# Patient Record
Sex: FEMALE | Race: WHITE | NOT HISPANIC OR LATINO | Employment: OTHER | URBAN - METROPOLITAN AREA
[De-identification: names, ages, dates, MRNs, and addresses within clinical notes are randomized per-mention and may not be internally consistent; named-entity substitution may affect disease eponyms.]

---

## 2017-10-31 ENCOUNTER — GENERIC CONVERSION - ENCOUNTER (OUTPATIENT)
Dept: OTHER | Facility: OTHER | Age: 67
End: 2017-10-31

## 2017-11-04 DIAGNOSIS — Z12.31 ENCOUNTER FOR SCREENING MAMMOGRAM FOR MALIGNANT NEOPLASM OF BREAST: ICD-10-CM

## 2017-11-17 ENCOUNTER — HOSPITAL ENCOUNTER (OUTPATIENT)
Dept: RADIOLOGY | Age: 67
Discharge: HOME | End: 2017-11-17
Payer: MEDICARE

## 2017-11-17 DIAGNOSIS — Z12.31 ENCOUNTER FOR SCREENING MAMMOGRAM FOR MALIGNANT NEOPLASM OF BREAST: ICD-10-CM

## 2017-11-17 PROCEDURE — G0202 SCR MAMMO BI INCL CAD: HCPCS

## 2018-01-22 VITALS
DIASTOLIC BLOOD PRESSURE: 64 MMHG | WEIGHT: 203.31 LBS | SYSTOLIC BLOOD PRESSURE: 126 MMHG | BODY MASS INDEX: 34.71 KG/M2 | HEIGHT: 64 IN

## 2018-10-30 ENCOUNTER — TRANSCRIBE ORDERS (OUTPATIENT)
Dept: ADMINISTRATIVE | Facility: HOSPITAL | Age: 68
End: 2018-10-30

## 2018-10-30 DIAGNOSIS — Z12.39 SCREENING BREAST EXAMINATION: ICD-10-CM

## 2018-10-30 DIAGNOSIS — Z13.820 SCREENING FOR OSTEOPOROSIS: Primary | ICD-10-CM

## 2018-11-26 ENCOUNTER — ANNUAL EXAM (OUTPATIENT)
Dept: OBGYN CLINIC | Facility: CLINIC | Age: 68
End: 2018-11-26
Payer: MEDICARE

## 2018-11-26 VITALS
DIASTOLIC BLOOD PRESSURE: 90 MMHG | WEIGHT: 198.6 LBS | BODY MASS INDEX: 33.9 KG/M2 | SYSTOLIC BLOOD PRESSURE: 140 MMHG | HEIGHT: 64 IN

## 2018-11-26 DIAGNOSIS — Z12.39 ENCOUNTER FOR SCREENING FOR MALIGNANT NEOPLASM OF BREAST: ICD-10-CM

## 2018-11-26 DIAGNOSIS — Z01.419 ENCOUNTER FOR GYNECOLOGICAL EXAMINATION (GENERAL) (ROUTINE) WITHOUT ABNORMAL FINDINGS: Primary | ICD-10-CM

## 2018-11-26 PROCEDURE — G0101 CA SCREEN;PELVIC/BREAST EXAM: HCPCS | Performed by: NURSE PRACTITIONER

## 2018-11-26 RX ORDER — MULTIVITAMIN
TABLET ORAL
COMMUNITY

## 2018-11-26 NOTE — ASSESSMENT & PLAN NOTE
Benign findings on routine gyn exam  Recommended monthly SBE, annual CBE and annual screening mammo  ASCCP guidelines reviewed and  this low risk patient was advised she meets criteria to d/c pap screening given age >71 and low risk status  DEXA is scheduled and colonoscopy noted to be up to date  The patient denies STI risk factors and declines testing at this time  Reviewed diet/activity recommendations  Discussed postmenopausal considerations and symptoms to report  RTO in one year for routine annual gyn exam or sooner PRN

## 2018-12-07 ENCOUNTER — HOSPITAL ENCOUNTER (OUTPATIENT)
Dept: RADIOLOGY | Age: 68
End: 2018-12-07
Payer: MEDICARE

## 2018-12-07 ENCOUNTER — HOSPITAL ENCOUNTER (OUTPATIENT)
Dept: RADIOLOGY | Age: 68
Discharge: HOME | End: 2018-12-07
Payer: MEDICARE

## 2018-12-07 VITALS — HEIGHT: 65 IN | BODY MASS INDEX: 32.32 KG/M2 | WEIGHT: 194 LBS

## 2018-12-07 DIAGNOSIS — Z12.39 ENCOUNTER FOR SCREENING FOR MALIGNANT NEOPLASM OF BREAST: ICD-10-CM

## 2018-12-07 PROCEDURE — 77067 SCR MAMMO BI INCL CAD: CPT

## 2019-10-29 ENCOUNTER — TELEPHONE (OUTPATIENT)
Dept: GASTROENTEROLOGY | Facility: CLINIC | Age: 69
End: 2019-10-29

## 2019-10-29 NOTE — TELEPHONE ENCOUNTER
Patients GI provider:  new pt     Number to return call: ([phone]    Reason for call: Pt calling to make an appt for a colon consult     Scheduled procedure/appointment date if applicable: Apt/procedure - n/a

## 2019-11-08 NOTE — TELEPHONE ENCOUNTER
Pt called in, she is scheduled with Dr Fernanda Suarez on 12/16  She would like to know if her appt could be changed to the same day as her , Jimmy Lorenz who is scheduled on 12/9 with Dr Elana Salinas (or vice versa) because he is her ride for appts   Please advise [phone]

## 2019-11-11 NOTE — TELEPHONE ENCOUNTER
Called patient, lvm to inform of the scheduled appts  Told patient to call back in to confirm this works for both of them

## 2019-11-11 NOTE — TELEPHONE ENCOUNTER
It looks as though her husbands and her appointments were both cancelled by them  Please call and offer 12/10 at 11am for pt and 1130 for  if they would like the apts

## 2019-11-26 ENCOUNTER — TRANSCRIBE ORDERS (OUTPATIENT)
Dept: ADMINISTRATIVE | Facility: HOSPITAL | Age: 69
End: 2019-11-26

## 2019-11-26 DIAGNOSIS — Z12.31 ENCOUNTER FOR SCREENING MAMMOGRAM FOR MALIGNANT NEOPLASM OF BREAST: Primary | ICD-10-CM

## 2020-01-10 ENCOUNTER — ANNUAL EXAM (OUTPATIENT)
Dept: OBGYN CLINIC | Facility: CLINIC | Age: 70
End: 2020-01-10
Payer: COMMERCIAL

## 2020-01-10 VITALS — WEIGHT: 188 LBS | DIASTOLIC BLOOD PRESSURE: 82 MMHG | BODY MASS INDEX: 31.28 KG/M2 | SYSTOLIC BLOOD PRESSURE: 118 MMHG

## 2020-01-10 DIAGNOSIS — Z01.419 ENCOUNTER FOR GYNECOLOGICAL EXAMINATION (GENERAL) (ROUTINE) WITHOUT ABNORMAL FINDINGS: Primary | ICD-10-CM

## 2020-01-10 PROCEDURE — G0101 CA SCREEN;PELVIC/BREAST EXAM: HCPCS | Performed by: NURSE PRACTITIONER

## 2020-01-10 NOTE — ASSESSMENT & PLAN NOTE
Benign findings on routine gyn exam  Recommended monthly SBE, annual CBE and annual screening mammo  ASCCP guidelines reviewed and this low risk patient was advised she meets criteria to d/c pap screening at age 71  DEXA and colonoscopy are scheduled for this year  The patient denies STI risk factors and declines testing at this time  Reviewed diet/activity recommendations:  Encouraged daily Ca++ and vitamin D intake as well as daily weight bearing exercise for promotion of bone health    Discussed postmenopausal considerations and symptoms to report  RTO in one year for routine annual gyn exam or sooner PRN

## 2020-01-10 NOTE — PROGRESS NOTES
Assessment/Plan:    Encounter for gynecological examination (general) (routine) without abnormal findings  Benign findings on routine gyn exam  Recommended monthly SBE, annual CBE and annual screening mammo  ASCCP guidelines reviewed and this low risk patient was advised she meets criteria to d/c pap screening at age 71  DEXA and colonoscopy are scheduled for this year  The patient denies STI risk factors and declines testing at this time  Reviewed diet/activity recommendations:  Encouraged daily Ca++ and vitamin D intake as well as daily weight bearing exercise for promotion of bone health    Discussed postmenopausal considerations and symptoms to report  RTO in one year for routine annual gyn exam or sooner PRN  Diagnoses and all orders for this visit:    Encounter for gynecological examination (general) (routine) without abnormal findings          Subjective:      Patient ID: Tereza Pike is a 78 y o  female  This patient presents for routine annual gyn exam   Medically stable  She denies acute gyn complaints  She denies  bleeding or spotting, VM sx, pelvic pain, breast concerns, abn discharge, bowel/bladder dysfunction, depression/anx   and monog  Denies STI concerns  7 children; 10 grandchildren  Retired      The following portions of the patient's history were reviewed and updated as appropriate: allergies, current medications, past family history, past medical history, past social history, past surgical history and problem list     Review of Systems   Constitutional: Negative  Respiratory: Negative  Cardiovascular: Negative  Gastrointestinal: Negative  Genitourinary: Negative  Musculoskeletal: Negative  Skin: Negative  Neurological: Negative  Psychiatric/Behavioral: Negative  Objective:      /82   Wt 85 3 kg (188 lb)   BMI 31 28 kg/m²          Physical Exam   Constitutional: She is oriented to person, place, and time   She appears well-developed and well-nourished  HENT:   Head: Normocephalic and atraumatic  Eyes: Pupils are equal, round, and reactive to light  EOM are normal    Neck: Normal range of motion  Neck supple  No thyromegaly present  Cardiovascular: Normal rate, regular rhythm and normal heart sounds  Pulmonary/Chest: Effort normal and breath sounds normal  No respiratory distress  She has no wheezes  She has no rales  She exhibits no mass, no tenderness and no deformity  Right breast exhibits no inverted nipple, no mass, no nipple discharge, no skin change and no tenderness  Left breast exhibits no inverted nipple, no mass, no nipple discharge, no skin change and no tenderness  No breast tenderness or discharge  Breasts are symmetrical    Abdominal: Soft  She exhibits no distension and no mass  There is no splenomegaly or hepatomegaly  There is no tenderness  There is no rebound and no guarding  Genitourinary: Rectum normal, vagina normal and uterus normal        No breast tenderness or discharge  No labial fusion  There is no rash, tenderness, lesion or injury on the right labia  There is no rash, tenderness, lesion or injury on the left labia  Cervix exhibits no motion tenderness, no discharge and no friability  Right adnexum displays no mass, no tenderness and no fullness  Left adnexum displays no mass, no tenderness and no fullness  No erythema, tenderness or bleeding in the vagina  No foreign body in the vagina  No vaginal discharge found  Musculoskeletal: Normal range of motion  Lymphadenopathy:     She has no cervical adenopathy  She has no axillary adenopathy  Neurological: She is alert and oriented to person, place, and time  No cranial nerve deficit  Skin: Skin is warm and dry  No rash noted  No cyanosis  Nails show no clubbing  Psychiatric: She has a normal mood and affect   Her speech is normal and behavior is normal  Judgment and thought content normal  Cognition and memory are normal

## 2020-01-28 ENCOUNTER — OFFICE VISIT (OUTPATIENT)
Dept: GASTROENTEROLOGY | Facility: AMBULARY SURGERY CENTER | Age: 70
End: 2020-01-28
Payer: COMMERCIAL

## 2020-01-28 VITALS
TEMPERATURE: 98.9 F | WEIGHT: 210 LBS | SYSTOLIC BLOOD PRESSURE: 120 MMHG | BODY MASS INDEX: 35.85 KG/M2 | HEIGHT: 64 IN | HEART RATE: 78 BPM | RESPIRATION RATE: 16 BRPM | DIASTOLIC BLOOD PRESSURE: 80 MMHG

## 2020-01-28 DIAGNOSIS — Z12.11 COLON CANCER SCREENING: ICD-10-CM

## 2020-01-28 DIAGNOSIS — K59.00 CONSTIPATION, UNSPECIFIED CONSTIPATION TYPE: Primary | ICD-10-CM

## 2020-01-28 PROCEDURE — 99203 OFFICE O/P NEW LOW 30 MIN: CPT | Performed by: INTERNAL MEDICINE

## 2020-01-28 RX ORDER — POLYETHYLENE GLYCOL 3350 17 G/17G
17 POWDER, FOR SOLUTION ORAL DAILY
Qty: 578 G | Refills: 5 | Status: SHIPPED | OUTPATIENT
Start: 2020-01-28 | End: 2021-12-02 | Stop reason: ALTCHOICE

## 2020-01-28 RX ORDER — MELATONIN
1000 DAILY
COMMUNITY

## 2020-01-28 NOTE — PATIENT INSTRUCTIONS
Colon cancer surveillance  - schedule colonoscopy with golytely     Constipation  - start taking fiber (metamucil) daily scheduled  - start taking miralax daily scheduled  - take sennakot 2 tabs daily as needed    Follow up as needed

## 2020-01-28 NOTE — PROGRESS NOTES
[facility] - Outpatient Consultation  Katherine Nettles 78 y o  female MRN: [id number]  Encounter: [id number]          ASSESSMENT AND PLAN:      1  Colon cancer screening  - schedule colonoscopy with golytely     2  Constipation  - start taking fiber daily scheduled  - start taking miralax daily scheduled  - take sennakot 2 tabs daily as needed    Follow up as needed    ______________________________________________________________________    HPI:  Ms Bud Khanna is a 77 yo W who presents to discuss colonoscopy for colon cancer screening  Co-morbidities:    Colon cancer screening  - last colonoscopy 5 years ago (no polyps, but had inadequate prep)  - has constipation chronically  - no chest pain or shortness of breath with exertion    Chronic constipation  - 1-2x per day  - stool is hard; BSS 2-3  - occ strain  - take miralax qd prn and senna 2 tabs prn  - takes metamucil occ too    ROS: No abd pain, nausea, vomiting, heartburn, acid reflux, diarrhea, constipation, odynophagia, dysphagia, hematemesis, melena, hematochezia, early satiety, appetite changes, wt loss  No jaundice, abd swelling, LE swelling, confusion, pruritus  REVIEW OF SYSTEMS:    CONSTITUTIONAL: Denies any fever, chills, rigors, and weight loss  HEENT: No earache or tinnitus  Denies hearing loss or visual disturbances  CARDIOVASCULAR: No chest pain or palpitations  RESPIRATORY: Denies any cough, hemoptysis, shortness of breath or dyspnea on exertion  GASTROINTESTINAL: As noted in the History of Present Illness  GENITOURINARY: No problems with urination  Denies any hematuria or dysuria  NEUROLOGIC: No dizziness or vertigo, denies headaches  MUSCULOSKELETAL: Denies any muscle or joint pain  SKIN: Denies skin rashes or itching  ENDOCRINE: Denies excessive thirst  Denies intolerance to heat or cold  PSYCHOSOCIAL: Denies depression or anxiety  Denies any recent memory loss         Historical Information   No past medical history on file  Past Surgical History:   Procedure Laterality Date    CATARACT EXTRACTION, BILATERAL  2018     SECTION       Social History   Social History     Substance and Sexual Activity   Alcohol Use Yes    Comment: social     Social History     Substance and Sexual Activity   Drug Use No     Social History     Tobacco Use   Smoking Status Never Smoker   Smokeless Tobacco Never Used     Family History   Problem Relation Age of Onset    Diabetes Mother     No Known Problems Father     Diabetes Sister     Diabetes Brother     No Known Problems Daughter     No Known Problems Son     No Known Problems Maternal Grandmother     No Known Problems Maternal Grandfather     No Known Problems Paternal Grandmother     No Known Problems Paternal Grandfather     Diabetes Sister     No Known Problems Sister     No Known Problems Sister     Diabetes Brother     No Known Problems Brother     No Known Problems Brother     No Known Problems Daughter     No Known Problems Daughter     No Known Problems Daughter     No Known Problems Daughter        Meds/Allergies       Current Outpatient Medications:     Multiple Vitamin (MULTI-VITAMIN DAILY) TABS    No Known Allergies        Objective     There were no vitals taken for this visit  There is no height or weight on file to calculate BMI  PHYSICAL EXAM:      General Appearance:   Alert, cooperative, no distress   HEENT:   Normocephalic, atraumatic, anicteric  Neck:  Supple, symmetrical, trachea midline   Lungs:   Clear to auscultation bilaterally; no rales, rhonchi or wheezing; respirations unlabored    Heart[de-identified]   Regular rate and rhythm; no murmur, rub, or gallop     Abdomen:   Soft, non-tender, non-distended; normal bowel sounds; no masses, no organomegaly    Rectal:   Deferred   Neuro:   Alert, oriented, no gross deficits, normal strength and tone   Extremities:  No cyanosis, clubbing or edema    Psych:  Normal mood and affect Skin:  No jaundice, rashes, or lesions    Lymph nodes:  No palpable cervical lymphadenopathy        Lab Results:   No visits with results within 1 Day(s) from this visit  Latest known visit with results is:   [employer] Office Visit on 10/17/2016   Component Date Value    PAP 10/17/2016 NILM     HPV HIGH RISK 16/18 10/17/2016 Not Detected     HPV 18 10/17/2016 Not Detected     HPV16 10/17/2016 Not Detected          Radiology Results:   No results found      Endoscopies:

## 2020-01-31 ENCOUNTER — HOSPITAL ENCOUNTER (OUTPATIENT)
Dept: RADIOLOGY | Age: 70
Discharge: HOME | End: 2020-01-31
Payer: COMMERCIAL

## 2020-01-31 ENCOUNTER — TRANSCRIBE ORDERS (OUTPATIENT)
Dept: ADMINISTRATIVE | Age: 70
End: 2020-01-31

## 2020-01-31 ENCOUNTER — TELEPHONE (OUTPATIENT)
Dept: OBGYN CLINIC | Facility: CLINIC | Age: 70
End: 2020-01-31

## 2020-01-31 VITALS — BODY MASS INDEX: 35.85 KG/M2 | HEIGHT: 64 IN | WEIGHT: 210 LBS

## 2020-01-31 DIAGNOSIS — Z13.820 SCREENING FOR OSTEOPOROSIS: ICD-10-CM

## 2020-01-31 DIAGNOSIS — Z12.31 ENCOUNTER FOR SCREENING MAMMOGRAM FOR MALIGNANT NEOPLASM OF BREAST: ICD-10-CM

## 2020-01-31 DIAGNOSIS — Z13.820 SCREENING FOR OSTEOPOROSIS: Primary | ICD-10-CM

## 2020-01-31 PROCEDURE — 77067 SCR MAMMO BI INCL CAD: CPT

## 2020-01-31 PROCEDURE — 77080 DXA BONE DENSITY AXIAL: CPT

## 2020-01-31 NOTE — TELEPHONE ENCOUNTER
Received call from [facility], they needed new order for dexa, order in was not , they said if more than one year new order needed

## 2020-02-07 ENCOUNTER — TELEPHONE (OUTPATIENT)
Dept: OBGYN CLINIC | Facility: CLINIC | Age: 70
End: 2020-02-07

## 2020-02-07 NOTE — TELEPHONE ENCOUNTER
----- Message from [facility], [address] sent at 2/6/2020  5:09 PM EST -----  DEXA scan shows osteopenia in the left hip  Other sites evaluated are WNL  Please encourage Ca++ 1000mg/day with vit D and daily weight bearing exercise   Repeat DEXA in 2-3 years   Please notify patient

## 2020-05-14 ENCOUNTER — TELEPHONE (OUTPATIENT)
Dept: GASTROENTEROLOGY | Facility: AMBULARY SURGERY CENTER | Age: 70
End: 2020-05-14

## 2020-12-05 ENCOUNTER — TRANSCRIBE ORDERS (OUTPATIENT)
Dept: LAB | Facility: CLINIC | Age: 70
End: 2020-12-05

## 2020-12-05 ENCOUNTER — LAB (OUTPATIENT)
Dept: LAB | Facility: CLINIC | Age: 70
End: 2020-12-05
Payer: COMMERCIAL

## 2020-12-05 DIAGNOSIS — M86.9 SAPHO SYNDROME (HCC): ICD-10-CM

## 2020-12-05 DIAGNOSIS — M85.80 SAPHO SYNDROME (HCC): ICD-10-CM

## 2020-12-05 DIAGNOSIS — E78.1 PURE HYPERGLYCERIDEMIA: ICD-10-CM

## 2020-12-05 DIAGNOSIS — E78.6 FAMILIAL LIPOPROTEIN DEFICIENCY: ICD-10-CM

## 2020-12-05 DIAGNOSIS — L70.9 SAPHO SYNDROME (HCC): ICD-10-CM

## 2020-12-05 DIAGNOSIS — R73.9 BLOOD GLUCOSE ELEVATED: Primary | ICD-10-CM

## 2020-12-05 DIAGNOSIS — K59.00 CONSTIPATION, UNSPECIFIED CONSTIPATION TYPE: ICD-10-CM

## 2020-12-05 DIAGNOSIS — M65.9 SAPHO SYNDROME (HCC): ICD-10-CM

## 2020-12-05 DIAGNOSIS — R73.9 BLOOD GLUCOSE ELEVATED: ICD-10-CM

## 2020-12-05 DIAGNOSIS — L40.3 SAPHO SYNDROME (HCC): ICD-10-CM

## 2020-12-05 LAB
25(OH)D3 SERPL-MCNC: 59.6 NG/ML (ref 30–100)
ALBUMIN SERPL BCP-MCNC: 3.7 G/DL (ref 3.5–5)
ALP SERPL-CCNC: 61 U/L (ref 46–116)
ALT SERPL W P-5'-P-CCNC: 27 U/L (ref 12–78)
ANION GAP SERPL CALCULATED.3IONS-SCNC: 7 MMOL/L (ref 4–13)
AST SERPL W P-5'-P-CCNC: 18 U/L (ref 5–45)
BASOPHILS # BLD AUTO: 0.03 THOUSANDS/ΜL (ref 0–0.1)
BASOPHILS NFR BLD AUTO: 1 % (ref 0–1)
BILIRUB SERPL-MCNC: 0.36 MG/DL (ref 0.2–1)
BUN SERPL-MCNC: 10 MG/DL (ref 5–25)
CALCIUM SERPL-MCNC: 9.6 MG/DL (ref 8.3–10.1)
CHLORIDE SERPL-SCNC: 102 MMOL/L (ref 100–108)
CHOLEST SERPL-MCNC: 178 MG/DL (ref 50–200)
CO2 SERPL-SCNC: 28 MMOL/L (ref 21–32)
CREAT SERPL-MCNC: 0.83 MG/DL (ref 0.6–1.3)
EOSINOPHIL # BLD AUTO: 0.08 THOUSAND/ΜL (ref 0–0.61)
EOSINOPHIL NFR BLD AUTO: 1 % (ref 0–6)
ERYTHROCYTE [DISTWIDTH] IN BLOOD BY AUTOMATED COUNT: 14.5 % (ref 11.6–15.1)
EST. AVERAGE GLUCOSE BLD GHB EST-MCNC: 128 MG/DL
GFR SERPL CREATININE-BSD FRML MDRD: 71 ML/MIN/1.73SQ M
GLUCOSE P FAST SERPL-MCNC: 105 MG/DL (ref 65–99)
HBA1C MFR BLD: 6.1 %
HCT VFR BLD AUTO: 40.3 % (ref 34.8–46.1)
HDLC SERPL-MCNC: 57 MG/DL
HGB BLD-MCNC: 12.4 G/DL (ref 11.5–15.4)
IMM GRANULOCYTES # BLD AUTO: 0 THOUSAND/UL (ref 0–0.2)
IMM GRANULOCYTES NFR BLD AUTO: 0 % (ref 0–2)
LDLC SERPL CALC-MCNC: 100 MG/DL (ref 0–100)
LYMPHOCYTES # BLD AUTO: 2.96 THOUSANDS/ΜL (ref 0.6–4.47)
LYMPHOCYTES NFR BLD AUTO: 47 % (ref 14–44)
MCH RBC QN AUTO: 25.6 PG (ref 26.8–34.3)
MCHC RBC AUTO-ENTMCNC: 30.8 G/DL (ref 31.4–37.4)
MCV RBC AUTO: 83 FL (ref 82–98)
MONOCYTES # BLD AUTO: 0.5 THOUSAND/ΜL (ref 0.17–1.22)
MONOCYTES NFR BLD AUTO: 8 % (ref 4–12)
NEUTROPHILS # BLD AUTO: 2.67 THOUSANDS/ΜL (ref 1.85–7.62)
NEUTS SEG NFR BLD AUTO: 43 % (ref 43–75)
NONHDLC SERPL-MCNC: 121 MG/DL
NRBC BLD AUTO-RTO: 0 /100 WBCS
PLATELET # BLD AUTO: 247 THOUSANDS/UL (ref 149–390)
PMV BLD AUTO: 10.4 FL (ref 8.9–12.7)
POTASSIUM SERPL-SCNC: 4.2 MMOL/L (ref 3.5–5.3)
PROT SERPL-MCNC: 7.8 G/DL (ref 6.4–8.2)
RBC # BLD AUTO: 4.85 MILLION/UL (ref 3.81–5.12)
SODIUM SERPL-SCNC: 137 MMOL/L (ref 136–145)
TRIGL SERPL-MCNC: 105 MG/DL
WBC # BLD AUTO: 6.24 THOUSAND/UL (ref 4.31–10.16)

## 2020-12-05 PROCEDURE — 85025 COMPLETE CBC W/AUTO DIFF WBC: CPT

## 2020-12-05 PROCEDURE — 36415 COLL VENOUS BLD VENIPUNCTURE: CPT

## 2020-12-05 PROCEDURE — 80053 COMPREHEN METABOLIC PANEL: CPT

## 2020-12-05 PROCEDURE — 82306 VITAMIN D 25 HYDROXY: CPT

## 2020-12-05 PROCEDURE — 83036 HEMOGLOBIN GLYCOSYLATED A1C: CPT

## 2020-12-05 PROCEDURE — 80061 LIPID PANEL: CPT

## 2020-12-06 ENCOUNTER — APPOINTMENT (OUTPATIENT)
Dept: LAB | Facility: CLINIC | Age: 70
End: 2020-12-06
Payer: COMMERCIAL

## 2020-12-06 LAB
BACTERIA UR QL AUTO: ABNORMAL /HPF
BILIRUB UR QL STRIP: NEGATIVE
CLARITY UR: ABNORMAL
COLOR UR: ABNORMAL
GLUCOSE UR STRIP-MCNC: NEGATIVE MG/DL
HGB UR QL STRIP.AUTO: NEGATIVE
KETONES UR STRIP-MCNC: NEGATIVE MG/DL
LEUKOCYTE ESTERASE UR QL STRIP: ABNORMAL
NITRITE UR QL STRIP: NEGATIVE
NON-SQ EPI CELLS URNS QL MICRO: ABNORMAL /HPF
PH UR STRIP.AUTO: 6 [PH]
PROT UR STRIP-MCNC: NEGATIVE MG/DL
RBC #/AREA URNS AUTO: ABNORMAL /HPF
SP GR UR STRIP.AUTO: <=1.005 (ref 1–1.03)
UROBILINOGEN UR QL STRIP.AUTO: 0.2 E.U./DL
WBC #/AREA URNS AUTO: ABNORMAL /HPF

## 2020-12-06 PROCEDURE — 81001 URINALYSIS AUTO W/SCOPE: CPT | Performed by: INTERNAL MEDICINE

## 2021-02-08 ENCOUNTER — IMMUNIZATIONS (OUTPATIENT)
Dept: FAMILY MEDICINE CLINIC | Facility: HOSPITAL | Age: 71
End: 2021-02-08

## 2021-02-08 DIAGNOSIS — Z23 ENCOUNTER FOR IMMUNIZATION: Primary | ICD-10-CM

## 2021-02-08 PROCEDURE — 0001A SARS-COV-2 / COVID-19 MRNA VACCINE (PFIZER-BIONTECH) 30 MCG: CPT

## 2021-02-08 PROCEDURE — 91300 SARS-COV-2 / COVID-19 MRNA VACCINE (PFIZER-BIONTECH) 30 MCG: CPT

## 2021-02-23 ENCOUNTER — TELEPHONE (OUTPATIENT)
Dept: INTERNAL MEDICINE CLINIC | Facility: CLINIC | Age: 71
End: 2021-02-23

## 2021-02-28 ENCOUNTER — IMMUNIZATIONS (OUTPATIENT)
Dept: FAMILY MEDICINE CLINIC | Facility: HOSPITAL | Age: 71
End: 2021-02-28

## 2021-02-28 DIAGNOSIS — Z23 ENCOUNTER FOR IMMUNIZATION: Primary | ICD-10-CM

## 2021-02-28 PROCEDURE — 91300 SARS-COV-2 / COVID-19 MRNA VACCINE (PFIZER-BIONTECH) 30 MCG: CPT

## 2021-02-28 PROCEDURE — 0002A SARS-COV-2 / COVID-19 MRNA VACCINE (PFIZER-BIONTECH) 30 MCG: CPT

## 2021-03-01 ENCOUNTER — TELEPHONE (OUTPATIENT)
Dept: INTERNAL MEDICINE CLINIC | Facility: CLINIC | Age: 71
End: 2021-03-01

## 2021-06-29 ENCOUNTER — HOSPITAL ENCOUNTER (OUTPATIENT)
Dept: RADIOLOGY | Age: 71
Discharge: HOME | End: 2021-06-29
Payer: COMMERCIAL

## 2021-06-29 VITALS — BODY MASS INDEX: 31.58 KG/M2 | WEIGHT: 185 LBS | HEIGHT: 64 IN

## 2021-06-29 DIAGNOSIS — Z12.31 ENCOUNTER FOR SCREENING MAMMOGRAM FOR MALIGNANT NEOPLASM OF BREAST: ICD-10-CM

## 2021-06-29 PROCEDURE — 77063 BREAST TOMOSYNTHESIS BI: CPT

## 2021-06-29 PROCEDURE — 77067 SCR MAMMO BI INCL CAD: CPT

## 2021-10-02 ENCOUNTER — IMMUNIZATIONS (OUTPATIENT)
Dept: FAMILY MEDICINE CLINIC | Facility: HOSPITAL | Age: 71
End: 2021-10-02

## 2021-10-02 DIAGNOSIS — Z23 ENCOUNTER FOR IMMUNIZATION: Primary | ICD-10-CM

## 2021-10-02 PROCEDURE — 91300 SARS-COV-2 / COVID-19 MRNA VACCINE (PFIZER-BIONTECH) 30 MCG: CPT

## 2021-10-02 PROCEDURE — 0001A SARS-COV-2 / COVID-19 MRNA VACCINE (PFIZER-BIONTECH) 30 MCG: CPT

## 2021-11-01 ENCOUNTER — CLINICAL SUPPORT (OUTPATIENT)
Dept: INTERNAL MEDICINE CLINIC | Facility: CLINIC | Age: 71
End: 2021-11-01
Payer: COMMERCIAL

## 2021-11-01 DIAGNOSIS — Z23 NEED FOR PROPHYLACTIC VACCINATION AND INOCULATION AGAINST INFLUENZA: Primary | ICD-10-CM

## 2021-11-01 PROCEDURE — 90662 IIV NO PRSV INCREASED AG IM: CPT | Performed by: INTERNAL MEDICINE

## 2021-11-01 PROCEDURE — G0008 ADMIN INFLUENZA VIRUS VAC: HCPCS | Performed by: INTERNAL MEDICINE

## 2021-12-02 ENCOUNTER — OFFICE VISIT (OUTPATIENT)
Dept: INTERNAL MEDICINE CLINIC | Facility: CLINIC | Age: 71
End: 2021-12-02
Payer: COMMERCIAL

## 2021-12-02 VITALS
OXYGEN SATURATION: 98 % | HEART RATE: 72 BPM | HEIGHT: 64 IN | BODY MASS INDEX: 33.46 KG/M2 | WEIGHT: 196 LBS | SYSTOLIC BLOOD PRESSURE: 142 MMHG | DIASTOLIC BLOOD PRESSURE: 86 MMHG | TEMPERATURE: 98.5 F

## 2021-12-02 DIAGNOSIS — Z00.00 MEDICARE ANNUAL WELLNESS VISIT, SUBSEQUENT: Primary | ICD-10-CM

## 2021-12-02 DIAGNOSIS — Z12.11 ENCOUNTER FOR SCREENING FOR COLORECTAL MALIGNANT NEOPLASM IN HIGH RISK PATIENT: ICD-10-CM

## 2021-12-02 DIAGNOSIS — E78.1 HYPERTRIGLYCERIDEMIA: ICD-10-CM

## 2021-12-02 DIAGNOSIS — J30.89 SEASONAL ALLERGIC RHINITIS DUE TO OTHER ALLERGIC TRIGGER: ICD-10-CM

## 2021-12-02 DIAGNOSIS — K59.00 CONSTIPATION, UNSPECIFIED CONSTIPATION TYPE: ICD-10-CM

## 2021-12-02 DIAGNOSIS — M85.80 OSTEOPENIA, UNSPECIFIED LOCATION: ICD-10-CM

## 2021-12-02 DIAGNOSIS — E55.9 VITAMIN D DEFICIENCY: ICD-10-CM

## 2021-12-02 DIAGNOSIS — Z12.12 ENCOUNTER FOR SCREENING FOR COLORECTAL MALIGNANT NEOPLASM IN HIGH RISK PATIENT: ICD-10-CM

## 2021-12-02 DIAGNOSIS — M79.672 PAIN OF LEFT HEEL: ICD-10-CM

## 2021-12-02 DIAGNOSIS — Z91.89 ENCOUNTER FOR SCREENING FOR COLORECTAL MALIGNANT NEOPLASM IN HIGH RISK PATIENT: ICD-10-CM

## 2021-12-02 DIAGNOSIS — R73.9 HYPERGLYCEMIA: ICD-10-CM

## 2021-12-02 PROBLEM — J30.9 ALLERGIC RHINITIS: Status: ACTIVE | Noted: 2021-12-02

## 2021-12-02 PROCEDURE — 3008F BODY MASS INDEX DOCD: CPT | Performed by: INTERNAL MEDICINE

## 2021-12-02 PROCEDURE — 99213 OFFICE O/P EST LOW 20 MIN: CPT | Performed by: INTERNAL MEDICINE

## 2021-12-02 PROCEDURE — 3725F SCREEN DEPRESSION PERFORMED: CPT | Performed by: INTERNAL MEDICINE

## 2021-12-02 PROCEDURE — 1160F RVW MEDS BY RX/DR IN RCRD: CPT | Performed by: INTERNAL MEDICINE

## 2021-12-02 PROCEDURE — 1125F AMNT PAIN NOTED PAIN PRSNT: CPT | Performed by: INTERNAL MEDICINE

## 2021-12-02 PROCEDURE — 1170F FXNL STATUS ASSESSED: CPT | Performed by: INTERNAL MEDICINE

## 2021-12-02 PROCEDURE — G0439 PPPS, SUBSEQ VISIT: HCPCS | Performed by: INTERNAL MEDICINE

## 2021-12-02 PROCEDURE — 3288F FALL RISK ASSESSMENT DOCD: CPT | Performed by: INTERNAL MEDICINE

## 2021-12-02 PROCEDURE — 1101F PT FALLS ASSESS-DOCD LE1/YR: CPT | Performed by: INTERNAL MEDICINE

## 2021-12-02 RX ORDER — POLYETHYLENE GLYCOL 3350 17 G/17G
17 POWDER, FOR SOLUTION ORAL DAILY
Qty: 507 G | Refills: 2 | Status: SHIPPED | OUTPATIENT
Start: 2021-12-02

## 2021-12-02 RX ORDER — PHENOL 1.4 %
600 AEROSOL, SPRAY (ML) MUCOUS MEMBRANE 2 TIMES DAILY WITH MEALS
COMMUNITY

## 2021-12-02 RX ORDER — FEXOFENADINE HCL 180 MG/1
180 TABLET ORAL AS NEEDED
COMMUNITY

## 2021-12-09 ENCOUNTER — TELEPHONE (OUTPATIENT)
Dept: INTERNAL MEDICINE CLINIC | Facility: CLINIC | Age: 71
End: 2021-12-09

## 2021-12-14 LAB — COLOGUARD RESULT REPORTABLE: NEGATIVE

## 2021-12-19 ENCOUNTER — APPOINTMENT (OUTPATIENT)
Dept: LAB | Facility: CLINIC | Age: 71
End: 2021-12-19
Payer: COMMERCIAL

## 2021-12-19 DIAGNOSIS — E78.1 HYPERTRIGLYCERIDEMIA: ICD-10-CM

## 2021-12-19 DIAGNOSIS — K59.00 CONSTIPATION, UNSPECIFIED CONSTIPATION TYPE: ICD-10-CM

## 2021-12-19 DIAGNOSIS — R73.9 HYPERGLYCEMIA: ICD-10-CM

## 2021-12-19 LAB
ALBUMIN SERPL BCP-MCNC: 3.8 G/DL (ref 3.5–5)
ALP SERPL-CCNC: 76 U/L (ref 46–116)
ALT SERPL W P-5'-P-CCNC: 26 U/L (ref 12–78)
ANION GAP SERPL CALCULATED.3IONS-SCNC: 6 MMOL/L (ref 4–13)
AST SERPL W P-5'-P-CCNC: 21 U/L (ref 5–45)
BACTERIA UR QL AUTO: NORMAL /HPF
BASOPHILS # BLD AUTO: 0.05 THOUSANDS/ΜL (ref 0–0.1)
BASOPHILS NFR BLD AUTO: 1 % (ref 0–1)
BILIRUB SERPL-MCNC: 0.55 MG/DL (ref 0.2–1)
BILIRUB UR QL STRIP: NEGATIVE
BUN SERPL-MCNC: 11 MG/DL (ref 5–25)
CALCIUM SERPL-MCNC: 9.2 MG/DL (ref 8.3–10.1)
CHLORIDE SERPL-SCNC: 101 MMOL/L (ref 100–108)
CHOLEST SERPL-MCNC: 184 MG/DL
CLARITY UR: CLEAR
CO2 SERPL-SCNC: 28 MMOL/L (ref 21–32)
COLOR UR: YELLOW
CREAT SERPL-MCNC: 0.84 MG/DL (ref 0.6–1.3)
EOSINOPHIL # BLD AUTO: 0 THOUSAND/ΜL (ref 0–0.61)
EOSINOPHIL NFR BLD AUTO: 0 % (ref 0–6)
ERYTHROCYTE [DISTWIDTH] IN BLOOD BY AUTOMATED COUNT: 15 % (ref 11.6–15.1)
EST. AVERAGE GLUCOSE BLD GHB EST-MCNC: 128 MG/DL
GFR SERPL CREATININE-BSD FRML MDRD: 69 ML/MIN/1.73SQ M
GLUCOSE P FAST SERPL-MCNC: 107 MG/DL (ref 65–99)
GLUCOSE UR STRIP-MCNC: NEGATIVE MG/DL
HBA1C MFR BLD: 6.1 %
HCT VFR BLD AUTO: 39.3 % (ref 34.8–46.1)
HDLC SERPL-MCNC: 53 MG/DL
HGB BLD-MCNC: 12.3 G/DL (ref 11.5–15.4)
HGB UR QL STRIP.AUTO: NEGATIVE
IMM GRANULOCYTES # BLD AUTO: 0.02 THOUSAND/UL (ref 0–0.2)
IMM GRANULOCYTES NFR BLD AUTO: 0 % (ref 0–2)
KETONES UR STRIP-MCNC: NEGATIVE MG/DL
LDLC SERPL CALC-MCNC: 113 MG/DL (ref 0–100)
LEUKOCYTE ESTERASE UR QL STRIP: ABNORMAL
LYMPHOCYTES # BLD AUTO: 3.07 THOUSANDS/ΜL (ref 0.6–4.47)
LYMPHOCYTES NFR BLD AUTO: 45 % (ref 14–44)
MCH RBC QN AUTO: 25.9 PG (ref 26.8–34.3)
MCHC RBC AUTO-ENTMCNC: 31.3 G/DL (ref 31.4–37.4)
MCV RBC AUTO: 83 FL (ref 82–98)
MONOCYTES # BLD AUTO: 0.56 THOUSAND/ΜL (ref 0.17–1.22)
MONOCYTES NFR BLD AUTO: 8 % (ref 4–12)
NEUTROPHILS # BLD AUTO: 3.07 THOUSANDS/ΜL (ref 1.85–7.62)
NEUTS SEG NFR BLD AUTO: 46 % (ref 43–75)
NITRITE UR QL STRIP: NEGATIVE
NON-SQ EPI CELLS URNS QL MICRO: NORMAL /HPF
NONHDLC SERPL-MCNC: 131 MG/DL
NRBC BLD AUTO-RTO: 0 /100 WBCS
PH UR STRIP.AUTO: 7 [PH]
PLATELET # BLD AUTO: 248 THOUSANDS/UL (ref 149–390)
PMV BLD AUTO: 10.3 FL (ref 8.9–12.7)
POTASSIUM SERPL-SCNC: 4 MMOL/L (ref 3.5–5.3)
PROT SERPL-MCNC: 7.4 G/DL (ref 6.4–8.2)
PROT UR STRIP-MCNC: NEGATIVE MG/DL
RBC # BLD AUTO: 4.74 MILLION/UL (ref 3.81–5.12)
RBC #/AREA URNS AUTO: NORMAL /HPF
SODIUM SERPL-SCNC: 135 MMOL/L (ref 136–145)
SP GR UR STRIP.AUTO: 1.01 (ref 1–1.03)
TRIGL SERPL-MCNC: 92 MG/DL
UROBILINOGEN UR QL STRIP.AUTO: 0.2 E.U./DL
WBC # BLD AUTO: 6.77 THOUSAND/UL (ref 4.31–10.16)
WBC #/AREA URNS AUTO: NORMAL /HPF

## 2021-12-19 PROCEDURE — 81001 URINALYSIS AUTO W/SCOPE: CPT | Performed by: INTERNAL MEDICINE

## 2021-12-19 PROCEDURE — 83036 HEMOGLOBIN GLYCOSYLATED A1C: CPT

## 2021-12-19 PROCEDURE — 85025 COMPLETE CBC W/AUTO DIFF WBC: CPT

## 2021-12-19 PROCEDURE — 36415 COLL VENOUS BLD VENIPUNCTURE: CPT

## 2021-12-19 PROCEDURE — 80053 COMPREHEN METABOLIC PANEL: CPT

## 2021-12-19 PROCEDURE — 80061 LIPID PANEL: CPT

## 2022-04-07 ENCOUNTER — HOSPITAL ENCOUNTER (OUTPATIENT)
Dept: RADIOLOGY | Age: 72
Discharge: HOME | End: 2022-04-07
Payer: COMMERCIAL

## 2022-04-07 DIAGNOSIS — M85.80 OSTEOPENIA, UNSPECIFIED LOCATION: ICD-10-CM

## 2022-04-07 PROCEDURE — 77080 DXA BONE DENSITY AXIAL: CPT

## 2022-04-18 ENCOUNTER — TELEPHONE (OUTPATIENT)
Dept: OBGYN CLINIC | Facility: CLINIC | Age: 72
End: 2022-04-18

## 2022-04-18 NOTE — TELEPHONE ENCOUNTER
----- Message from Dayna Alvarado, [address] sent at 4/18/2022  9:37 AM EDT -----  Osteopenia or low bone mineral density noted on DEXA scan  There has been a decrease is this bone density since your last screening 1/2020  Please schedule an appt to discuss this and complete her annual exam, which is past due  Adequate calcium and vit D intake recommended along with daily weight bearing exercises, avoidance of falls and limiting alcohol ingestion

## 2022-04-18 NOTE — TELEPHONE ENCOUNTER
Spoke to pt  She used to see KK  Not been here in 2 yrs  We got her set up for an annual end of May  She said her Dr told her to stay on the Ca and Vit D after seeing her results

## 2022-04-19 ENCOUNTER — TELEPHONE (OUTPATIENT)
Dept: FAMILY MEDICINE CLINIC | Facility: CLINIC | Age: 72
End: 2022-04-19

## 2022-04-19 NOTE — TELEPHONE ENCOUNTER
Discussed with the patient DEXA scan result  Advised to continue calcium and vitamin-D supplement as she is taking  as well as weight-bearing exercise

## 2022-04-28 ENCOUNTER — RA CDI HCC (OUTPATIENT)
Dept: OTHER | Facility: HOSPITAL | Age: 72
End: 2022-04-28

## 2022-04-28 NOTE — PROGRESS NOTES
[facility]  coding opportunities       Chart reviewed, no opportunity found:   [address]        Patients Insurance     Medicare Insurance: [employer]

## 2022-04-29 ENCOUNTER — OFFICE VISIT (OUTPATIENT)
Dept: INTERNAL MEDICINE CLINIC | Facility: CLINIC | Age: 72
End: 2022-04-29
Payer: COMMERCIAL

## 2022-04-29 VITALS
HEART RATE: 52 BPM | HEIGHT: 64 IN | WEIGHT: 192 LBS | RESPIRATION RATE: 18 BRPM | TEMPERATURE: 97.6 F | BODY MASS INDEX: 32.78 KG/M2 | OXYGEN SATURATION: 99 % | SYSTOLIC BLOOD PRESSURE: 132 MMHG | DIASTOLIC BLOOD PRESSURE: 76 MMHG

## 2022-04-29 DIAGNOSIS — H91.93 BILATERAL HEARING LOSS, UNSPECIFIED HEARING LOSS TYPE: Primary | ICD-10-CM

## 2022-04-29 DIAGNOSIS — E55.9 VITAMIN D DEFICIENCY: ICD-10-CM

## 2022-04-29 DIAGNOSIS — E78.1 HYPERTRIGLYCERIDEMIA: ICD-10-CM

## 2022-04-29 DIAGNOSIS — H61.23 BILATERAL IMPACTED CERUMEN: ICD-10-CM

## 2022-04-29 DIAGNOSIS — K59.00 CONSTIPATION, UNSPECIFIED CONSTIPATION TYPE: ICD-10-CM

## 2022-04-29 DIAGNOSIS — M85.89 OSTEOPENIA OF MULTIPLE SITES: ICD-10-CM

## 2022-04-29 DIAGNOSIS — R73.9 HYPERGLYCEMIA: ICD-10-CM

## 2022-04-29 PROBLEM — M85.80 OSTEOPENIA: Status: RESOLVED | Noted: 2021-12-02 | Resolved: 2022-04-29

## 2022-04-29 PROCEDURE — 99214 OFFICE O/P EST MOD 30 MIN: CPT | Performed by: INTERNAL MEDICINE

## 2022-04-29 PROCEDURE — 1003F LEVEL OF ACTIVITY ASSESS: CPT | Performed by: INTERNAL MEDICINE

## 2022-04-29 RX ORDER — SENNA AND DOCUSATE SODIUM 50; 8.6 MG/1; MG/1
2 TABLET, FILM COATED ORAL 2 TIMES DAILY PRN
COMMUNITY

## 2022-04-29 NOTE — PATIENT INSTRUCTIONS
Patient was advised to continue present medications  discussed with the patient medications and laboratory data in detail  Follow-up with me  as advised  If any blood test was ordered please do 1 week prior to next appointment unless advise to get earlier    If you have any questions please call the office [phone]

## 2022-04-29 NOTE — ASSESSMENT & PLAN NOTE
She had a DEXA scan 4/7/2022 revealed osteopenia  She has been taking calcium 600 mg b i d  and vitamin-D 1000 International Units daily  She was advised to exercise and lose weight

## 2022-04-29 NOTE — ASSESSMENT & PLAN NOTE
Patient had a hearing loss bilaterally  Found to have a impacted cerumen bilaterally  I irrigated both ears successfully with a excessive wax on both side  She was able to hear well after irrigation  Both ear canals clear no signs of infection both tympanic membranes intact

## 2022-04-29 NOTE — PROGRESS NOTES
Assessment/Plan:    1  Bilateral hearing loss, unspecified hearing loss type  Assessment & Plan:  Patient had a hearing loss bilaterally  Found to have a impacted cerumen bilaterally  I irrigated both ears successfully with a excessive wax on both side  She was able to hear well after irrigation  Both ear canals clear no signs of infection both tympanic membranes intact  2  Bilateral impacted cerumen  Assessment & Plan:  See as above      3  Osteopenia of multiple sites  Assessment & Plan:  She had a DEXA scan 04/07/2022 revealed osteopenia  She was advised to continue calcium 600 mg p o  b i d  and vitamin-D 1000 International Units daily  She was advised for weight-bearing exercise and lose weight  4  Hyperglycemia  Assessment & Plan:  Her fasting blood sugar 107 and hemoglobin A1c 6 1 so advised to watch diet for sugar and carbs intake  She was advised she is considered as a prediabetic  Her   has a diabetes mellitus and they know about diet  Orders:  -     Basic metabolic panel; Future  -     Hemoglobin A1C; Future    5  Hypertriglyceridemia  Assessment & Plan:  Diet controlled  Her cholesterol increased from 170-184, triglyceride decreased from 105-92 and LDL increased from 100-113  Patient is on diet  Advised to continue low-cholesterol low saturated diet  6  Vitamin D deficiency  Assessment & Plan:  Her vitamin-D deficiency resolved after being on vitamin-D supplement  Advised to continue vitamin-D daily  Last vitamin-D level was 59 6  7  Constipation, unspecified constipation type  Assessment & Plan:  She takes Senokot 2 tablets p o  b i d  p r n     Advised to increase fluid and fibers  8  BMI 32 0-32 9,adult    BMI Counseling: Body mass index is 32 96 kg/m²   The BMI is above normal  Nutrition recommendations include decreasing portion sizes, encouraging healthy choices of fruits and vegetables, decreasing fast food intake, consuming healthier snacks, limiting drinks that contain sugar, moderation in carbohydrate intake, reducing intake of saturated and trans fat and reducing intake of cholesterol  Exercise recommendations include exercising 3-5 times per week  No pharmacotherapy was ordered  Rationale for BMI follow-up plan is due to patient being overweight or obese  Subjective:  Patient presents for follow-up her medical problems and complain of bilateral hearing loss  Patient ID: Unknown Alban is a 66 y o  female  HPI   69-year-old female patient presents for follow-up her medical problems and complain of unable to hear from both ears  She denies any cough, fever, chills  Denies any sinus congestion or sinus drip  Denies any nausea vomiting diarrhea pain abdomen  Denies any chest pain or shortness of breath  She got her COVID-19 vaccination including booster dose    The following portions of the patient's history were reviewed and updated as appropriate:     Past Medical History:  She has a past medical history of Allergic rhinitis (2021), Bilateral hearing loss (2022), Bilateral impacted cerumen (2022), BMI 32 0-32 9,adult (2022), BMI 33 0-33 9,adult (2021), Cataract, bilateral, Constipation, Constipation (2021), Elevated glucose level, Fatigue, History of anemia, Hypertriglyceridemia, Hypertriglyceridemia (2021), Medicare annual wellness visit, subsequent (2021), Obesity, Osteopenia, Osteopenia of multiple sites (2022), Pain of left heel (2021), Post-menopausal, Rash, Vitamin D deficiency, and Vitamin D deficiency (2021)  ,  _______________________________________________________________________  Past Surgical History:   has a past surgical history that includes  section; Cataract extraction, bilateral (); Colonoscopy (2014); Mammo (historical) (2020); and DXA procedure(historical) (2020)  ,  _______________________________________________________________________  [facility] History:  family history includes Diabetes in her brother, brother, mother, sister, and sister; No Known Problems in her brother, brother, daughter, daughter, daughter, daughter, daughter, father, maternal aunt, maternal grandfather, maternal grandmother, paternal aunt, paternal grandfather, paternal grandmother, sister, sister, and son; Other in her mother ,  _______________________________________________________________________  Social History:   reports that she has never smoked  She has never used smokeless tobacco  She reports current alcohol use of about 1 0 standard drink of alcohol per week  She reports that she does not use drugs  ,  _______________________________________________________________________  Allergies:  has No Known Allergies     _______________________________________________________________________  Current Outpatient Medications   Medication Sig Dispense Refill    senna-docusate sodium (SENOKOT-S) 8 6-50 mg per tablet Take 2 tablets by mouth 2 (two) times a day as needed      calcium carbonate (OS-FELICITA) 600 MG tablet Take 600 mg by mouth 2 (two) times a day with meals      cholecalciferol (VITAMIN D3) 1,000 units tablet Take 1,000 Units by mouth daily      fexofenadine (ALLEGRA) 180 MG tablet Take 180 mg by mouth as needed      glucose blood test strip Use 1 each daily as needed Use as instructed      Multiple Vitamin (MULTI-VITAMIN DAILY) TABS Take by mouth      polyethylene glycol (GLYCOLAX) 17 GM/SCOOP powder Take 17 g by mouth daily 507 g 2     No current facility-administered medications for this visit      _______________________________________________________________________  Review of Systems   Constitutional: Negative for chills and fever  HENT: Positive for hearing loss (Bilateral)  Negative for congestion, ear pain, nosebleeds, sinus pain, sore throat and trouble swallowing  Eyes: Negative for discharge, redness and visual disturbance     Respiratory: Negative for cough, chest tightness and shortness of breath  Cardiovascular: Negative for chest pain and palpitations  Gastrointestinal: Positive for constipation ( but better on medication)  Negative for abdominal pain, blood in stool, diarrhea, nausea and vomiting  Genitourinary: Negative for dysuria, flank pain, frequency and hematuria  Musculoskeletal: Negative for arthralgias, myalgias and neck pain  Skin: Negative for color change and rash  Neurological: Negative for dizziness, speech difficulty, weakness and headaches  Hematological: Does not bruise/bleed easily  Psychiatric/Behavioral: Negative for agitation and behavioral problems  Objective:  Her repeat started was 60  Vitals:    04/29/22 1210   BP: 132/76   Pulse: (!) 52   Resp: 18   Temp: 97 6 °F (36 4 °C)   SpO2: 99%   Weight: 87 1 kg (192 lb)   Height: 5' 4" (1 626 m)     Body mass index is 32 96 kg/m²  Physical Exam  Vitals and nursing note reviewed  Constitutional:       General: She is not in acute distress  Appearance: Normal appearance  HENT:      Head: Normocephalic and atraumatic  Right Ear: Ear canal and external ear normal       Left Ear: Ear canal and external ear normal       Ears:      Comments: She had a impacted cerumen bilaterally  After I irrigated with water and peroxide and removed large amount of wax from both ears she was able to hear well  Her both ear canals were clear  There was no signs of infection or discharge  Both tympanic membranes were intact  Nose: Nose normal       Mouth/Throat:      Mouth: Mucous membranes are moist    Eyes:      General: No scleral icterus  Right eye: No discharge  Left eye: No discharge  Extraocular Movements: Extraocular movements intact  Conjunctiva/sclera: Conjunctivae normal    Cardiovascular:      Rate and Rhythm: Normal rate and regular rhythm  Pulses: Normal pulses  Heart sounds: No murmur heard        Pulmonary: Effort: Pulmonary effort is normal  No respiratory distress  Breath sounds: Normal breath sounds  Abdominal:      General: Bowel sounds are normal       Palpations: Abdomen is soft  Tenderness: There is no abdominal tenderness  Musculoskeletal:         General: Normal range of motion  Cervical back: Normal range of motion and neck supple  No muscular tenderness  Right lower leg: No edema  Left lower leg: No edema  Skin:     General: Skin is warm  Findings: No rash  Neurological:      General: No focal deficit present  Mental Status: She is alert and oriented to person, place, and time  Motor: No weakness  Coordination: Coordination normal    Psychiatric:         Mood and Affect: Mood normal          Behavior: Behavior normal          I spent 30 minutes with the patient today    More than 50% time spent for reviewing of external notes, reviewing of the results of diagnostics test, management of care, patient education and ordering of test

## 2022-04-29 NOTE — ASSESSMENT & PLAN NOTE
She had a DEXA scan 04/07/2022 revealed osteopenia  She was advised to continue calcium 600 mg p o  b i d  and vitamin-D 1000 International Units daily  She was advised for weight-bearing exercise and lose weight

## 2022-04-29 NOTE — ASSESSMENT & PLAN NOTE
Her vitamin-D deficiency resolved after being on vitamin-D supplement  Advised to continue vitamin-D daily  Last vitamin-D level was 59 6

## 2022-04-30 NOTE — ASSESSMENT & PLAN NOTE
Diet controlled  Her cholesterol increased from 170-184, triglyceride decreased from 105-92 and LDL increased from 100-113  Patient is on diet  Advised to continue low-cholesterol low saturated diet

## 2022-05-25 ENCOUNTER — IMMUNIZATIONS (OUTPATIENT)
Dept: FAMILY MEDICINE CLINIC | Facility: CLINIC | Age: 72
End: 2022-05-25
Payer: COMMERCIAL

## 2022-05-25 DIAGNOSIS — Z23 ENCOUNTER FOR IMMUNIZATION: Primary | ICD-10-CM

## 2022-05-25 PROCEDURE — 91305 PR SARSCOV2 VACCINE 30MCG/0.3ML TRIS-SUCROSE IM USE: CPT

## 2022-05-25 PROCEDURE — 0054A PR IMM ADMN SARSCOV2 30MCG/0.3ML TRIS-SUCROSE BST: CPT

## 2022-06-08 ENCOUNTER — TELEPHONE (OUTPATIENT)
Dept: INTERNAL MEDICINE CLINIC | Facility: CLINIC | Age: 72
End: 2022-06-08

## 2022-06-08 DIAGNOSIS — Z12.31 ENCOUNTER FOR SCREENING MAMMOGRAM FOR MALIGNANT NEOPLASM OF BREAST: Primary | ICD-10-CM

## 2022-06-22 ENCOUNTER — TELEPHONE (OUTPATIENT)
Dept: INTERNAL MEDICINE CLINIC | Facility: CLINIC | Age: 72
End: 2022-06-22

## 2022-06-22 NOTE — TELEPHONE ENCOUNTER
Discussed with her that I will order blood tests once I see her in office    To make an appointment to see me at office 1st

## 2022-06-22 NOTE — TELEPHONE ENCOUNTER
Wants you to call her - she would not tell me what it's about  She did call earlier to make an apt for her daughter this Friday, and I told her there were no open apts - but I would call her if someone cxl's  Maybe this is why?     She wouldn't tell me

## 2022-07-07 ENCOUNTER — HOSPITAL ENCOUNTER (OUTPATIENT)
Dept: RADIOLOGY | Age: 72
Discharge: HOME | End: 2022-07-07
Payer: COMMERCIAL

## 2022-07-07 VITALS — WEIGHT: 189 LBS | BODY MASS INDEX: 32.27 KG/M2 | HEIGHT: 64 IN

## 2022-07-07 DIAGNOSIS — Z12.31 ENCOUNTER FOR SCREENING MAMMOGRAM FOR MALIGNANT NEOPLASM OF BREAST: ICD-10-CM

## 2022-07-07 PROCEDURE — 77063 BREAST TOMOSYNTHESIS BI: CPT

## 2022-07-07 PROCEDURE — 77067 SCR MAMMO BI INCL CAD: CPT

## 2022-08-10 ENCOUNTER — HOSPITAL ENCOUNTER (EMERGENCY)
Facility: HOSPITAL | Age: 72
Discharge: HOME | End: 2022-08-10
Attending: EMERGENCY MEDICINE
Payer: COMMERCIAL

## 2022-08-10 ENCOUNTER — APPOINTMENT (EMERGENCY)
Dept: RADIOLOGY | Facility: HOSPITAL | Age: 72
End: 2022-08-10
Payer: COMMERCIAL

## 2022-08-10 VITALS
BODY MASS INDEX: 33.04 KG/M2 | RESPIRATION RATE: 18 BRPM | WEIGHT: 192.46 LBS | OXYGEN SATURATION: 98 % | HEART RATE: 89 BPM | DIASTOLIC BLOOD PRESSURE: 90 MMHG | SYSTOLIC BLOOD PRESSURE: 169 MMHG | TEMPERATURE: 98 F

## 2022-08-10 DIAGNOSIS — S80.211A ABRASION OF RIGHT KNEE, INITIAL ENCOUNTER: ICD-10-CM

## 2022-08-10 DIAGNOSIS — S80.01XA CONTUSION OF RIGHT KNEE, INITIAL ENCOUNTER: ICD-10-CM

## 2022-08-10 DIAGNOSIS — S80.02XA CONTUSION OF LEFT KNEE, INITIAL ENCOUNTER: ICD-10-CM

## 2022-08-10 DIAGNOSIS — S40.021A CONTUSION OF RIGHT UPPER ARM, INITIAL ENCOUNTER: ICD-10-CM

## 2022-08-10 DIAGNOSIS — S00.531A CONTUSION OF LIP, INITIAL ENCOUNTER: ICD-10-CM
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
## 2022-08-10 DIAGNOSIS — S80.212A ABRASION OF LEFT KNEE, INITIAL ENCOUNTER: ICD-10-CM

## 2022-08-10 PROCEDURE — 99282 EMERGENCY DEPT VISIT SF MDM: CPT | Performed by: EMERGENCY MEDICINE

## 2022-08-10 PROCEDURE — 73564 X-RAY EXAM KNEE 4 OR MORE: CPT

## 2022-08-10 PROCEDURE — 99284 EMERGENCY DEPT VISIT MOD MDM: CPT

## 2022-08-10 RX ORDER — ACETAMINOPHEN 325 MG/1
650 TABLET ORAL ONCE
Status: DISCONTINUED | OUTPATIENT
Start: 2022-08-10 | End: 2022-08-10 | Stop reason: HOSPADM

## 2022-08-10 RX ORDER — GINSENG 100 MG
1 CAPSULE ORAL ONCE
Status: COMPLETED | OUTPATIENT
Start: 2022-08-10 | End: 2022-08-10

## 2022-08-10 RX ADMIN — BACITRACIN 1 SMALL APPLICATION: 500 OINTMENT TOPICAL at 10:55

## 2022-08-10 NOTE — DISCHARGE INSTRUCTIONS
Diagnosis; fall/ lower lip contusion/ bilateral knee abrasions/contusions     - activity as tolerated     - please expect to feel sore and achy for next 1-2 weeks- might feel worse before feeling better     - for pain- can take over the counter generic tylenol 500 mg every 4 hrs     - for knee abrasions- please wash daily with soap and water -- can apply over the counter topical antibiotic ointment on back of large band aid   and apply daily during the day and wash off at night- for next week- can leave open at night     - please return to the er for any signs of wound infection - spreading redness/warmth/swelling/ red ness extending up thigh

## 2022-08-26 ENCOUNTER — ANNUAL EXAM (OUTPATIENT)
Dept: OBGYN CLINIC | Facility: CLINIC | Age: 72
End: 2022-08-26
Payer: COMMERCIAL

## 2022-08-26 VITALS
HEIGHT: 63 IN | BODY MASS INDEX: 33.84 KG/M2 | WEIGHT: 191 LBS | SYSTOLIC BLOOD PRESSURE: 136 MMHG | DIASTOLIC BLOOD PRESSURE: 80 MMHG

## 2022-08-26 DIAGNOSIS — Z12.31 ENCOUNTER FOR SCREENING MAMMOGRAM FOR BREAST CANCER: Primary | ICD-10-CM

## 2022-08-26 PROCEDURE — G0101 CA SCREEN;PELVIC/BREAST EXAM: HCPCS | Performed by: OBSTETRICS & GYNECOLOGY

## 2022-08-26 NOTE — PROGRESS NOTES
Assessment/Plan:      Encounter for screening mammogram for breast cancer  -     Mammo screening bilateral w 3d & cad; Future    Subjective      Vikki Arango is a 66 y o  female who presents for annual exam  The patient has no complaints today  The patient is sexually active  The patient is not taking hormone replacement therapy  Patient denies post-menopausal vaginal bleeding  The patient denies any urinary concerns  Menstrual History:  OB History        6    Para   6    Term   6            AB        Living   7       SAB        IAB        Ectopic        Multiple   1    Live Births   7                No LMP recorded   Patient is postmenopausal       Last pap 10/17/2016, neg/neg   Last mammo 2022, wnl   Last colonoscopy cologuard 2021,neg   Last dexa 2022, osteopenia      Past Medical History:   Diagnosis Date    Allergic rhinitis 2021    Bilateral hearing loss 2022    Bilateral impacted cerumen 2022    BMI 32 0-32 9,adult 2022    BMI 33 0-33 9,adult 2021    Cataract, bilateral     Constipation     Constipation 2021    Elevated glucose level     Fatigue     History of anemia     Hypertriglyceridemia     Hypertriglyceridemia 2021    Medicare annual wellness visit, subsequent 2021    Obesity     Osteopenia     Osteopenia of multiple sites 2022    Pain of left heel 2021    Post-menopausal     Rash     Vitamin D deficiency     Vitamin D deficiency 2021       Family History   Problem Relation Age of Onset    Diabetes Mother     Other Mother         Memory impairment, at old age   Jennifer Garnica No Known Problems Father     Diabetes Sister     Diabetes Brother     No Known Problems Daughter     No Known Problems Son     No Known Problems Maternal Grandmother     No Known Problems Maternal Grandfather     No Known Problems Paternal Grandmother     No Known Problems Paternal Grandfather     Diabetes Sister     No Known Problems Sister     No Known Problems Sister     Diabetes Brother     No Known Problems Brother     No Known Problems Brother     No Known Problems Daughter     No Known Problems Daughter     No Known Problems Daughter     No Known Problems Daughter     No Known Problems Maternal Aunt     No Known Problems Paternal Aunt        The following portions of the patient's history were reviewed and updated as appropriate: allergies, current medications, past family history, past medical history, past social history, past surgical history and problem list     Review of Systems  Pertinent items are noted in HPI  Objective      /80 (BP Location: Right arm, Patient Position: Sitting, Cuff Size: Standard)   Ht 5' 2 99" (1 6 m)   Wt 86 6 kg (191 lb)   BMI 33 84 kg/m²     General:   alert and oriented, in no acute distress   Heart:  Breasts: regular rate and rhythm    appear normal, no suspicious masses, no skin or nipple changes or axillary nodes     Lungs: effort normal   Abdomen: soft, non-tender, without masses or organomegaly   Vulva: normal   Vagina: normal mucosa   Cervix: no lesions   Uterus: normal size, mobile, non-tender   Adnexa: normal adnexa and no mass, fullness, tenderness

## 2022-08-31 ENCOUNTER — OFFICE VISIT (OUTPATIENT)
Dept: INTERNAL MEDICINE CLINIC | Facility: CLINIC | Age: 72
End: 2022-08-31
Payer: COMMERCIAL

## 2022-08-31 VITALS
OXYGEN SATURATION: 100 % | HEART RATE: 73 BPM | RESPIRATION RATE: 14 BRPM | TEMPERATURE: 98.3 F | SYSTOLIC BLOOD PRESSURE: 124 MMHG | HEIGHT: 64 IN | BODY MASS INDEX: 32.27 KG/M2 | DIASTOLIC BLOOD PRESSURE: 80 MMHG | WEIGHT: 189 LBS

## 2022-08-31 DIAGNOSIS — R73.9 HYPERGLYCEMIA: ICD-10-CM

## 2022-08-31 DIAGNOSIS — K59.00 CONSTIPATION, UNSPECIFIED CONSTIPATION TYPE: ICD-10-CM

## 2022-08-31 DIAGNOSIS — M25.562 ACUTE PAIN OF BOTH KNEES: Primary | ICD-10-CM

## 2022-08-31 DIAGNOSIS — E78.1 HYPERTRIGLYCERIDEMIA: ICD-10-CM

## 2022-08-31 DIAGNOSIS — S80.211A ABRASION OF KNEE, BILATERAL: ICD-10-CM

## 2022-08-31 DIAGNOSIS — S80.212A ABRASION OF KNEE, BILATERAL: ICD-10-CM

## 2022-08-31 DIAGNOSIS — M25.561 ACUTE PAIN OF BOTH KNEES: Primary | ICD-10-CM

## 2022-08-31 DIAGNOSIS — E55.9 VITAMIN D DEFICIENCY: ICD-10-CM

## 2022-08-31 DIAGNOSIS — M85.89 OSTEOPENIA OF MULTIPLE SITES: ICD-10-CM

## 2022-08-31 PROCEDURE — 99214 OFFICE O/P EST MOD 30 MIN: CPT | Performed by: INTERNAL MEDICINE

## 2022-08-31 PROCEDURE — 1160F RVW MEDS BY RX/DR IN RCRD: CPT | Performed by: INTERNAL MEDICINE

## 2022-08-31 PROCEDURE — 3725F SCREEN DEPRESSION PERFORMED: CPT | Performed by: INTERNAL MEDICINE

## 2022-08-31 PROCEDURE — 3288F FALL RISK ASSESSMENT DOCD: CPT | Performed by: INTERNAL MEDICINE

## 2022-08-31 PROCEDURE — 1100F PTFALLS ASSESS-DOCD GE2>/YR: CPT | Performed by: INTERNAL MEDICINE

## 2022-08-31 NOTE — ASSESSMENT & PLAN NOTE
She has been taking vitamin-D supplement 1000 International Units daily and calcium 600 mg b i d  advised weight-bearing exercise  Last DEXA scan was done 04/07/2022 revealed osteopenia

## 2022-08-31 NOTE — ASSESSMENT & PLAN NOTE
Her fasting blood sugar 107 and hemoglobin A1c 6 1  Patient advised to watch diet for sugar and carbs intake    Will follow his blood sugar and hemoglobin A1c

## 2022-08-31 NOTE — ASSESSMENT & PLAN NOTE
She takes vitamin-D supplement 1000 International Units daily  When vitamin-D level 59    Advised to continue vitamin-D supplement daily

## 2022-08-31 NOTE — ASSESSMENT & PLAN NOTE
Status post fall  X-ray unremarkable except some arthritis  Pain is almost resolved  Most likely secondary to acute sprain  Has a good range of motion  No gross swelling  Patient is not taking pain medication now

## 2022-08-31 NOTE — ASSESSMENT & PLAN NOTE
Resolved after being on diet    Last cholesterol 184, triglyceride 92, HDL  advised to continue to watch diet for cholesterol saturated fat feel

## 2022-08-31 NOTE — PATIENT INSTRUCTIONS
Patient was advised to continue present medications  discussed with the patient medications and laboratory data in detail  Follow-up with me in 3 months or as advised  If any blood test was ordered please do 1 week prior to next appointment unless advise to get earlier    If you have any questions please call the office [phone]

## 2022-08-31 NOTE — PROGRESS NOTES
Assessment/Plan:    1  Acute pain of both knees  Assessment & Plan:  Status post fall  X-ray unremarkable except some arthritis  Pain is almost resolved  Most likely secondary to acute sprain  Has a good range of motion  No gross swelling  Patient is not taking pain medication now  2  Vitamin D deficiency  Assessment & Plan:  She takes vitamin-D supplement 1000 International Units daily  When vitamin-D level 59  Advised to continue vitamin-D supplement daily    Orders:  -     Vitamin D 25 hydroxy; Future    3  Osteopenia of multiple sites  Assessment & Plan:  She has been taking vitamin-D supplement 1000 International Units daily and calcium 600 mg b i d  advised weight-bearing exercise  Last DEXA scan was done 04/07/2022 revealed osteopenia  Orders:  -     CBC and differential; Future  -     Comprehensive metabolic panel; Future  -     Vitamin D 25 hydroxy; Future    4  Constipation, unspecified constipation type  Assessment & Plan:  Presently controlled on Senokot as well as MiraLax powder  Advised to increase fluid and fibers  Orders:  -     CBC and differential; Future  -     Comprehensive metabolic panel; Future    5  BMI 32 0-32 9,adult  Assessment & Plan:  Patient  was advised to decrease portion size  Advised to decrease carb, sugar, cholesterol intake  Advised to exercise 3-5 times per week  Advised to lose weight  6  Abrasion of knee, bilateral  Assessment & Plan:  Healed well  No signs of infection  7  Hyperglycemia  Assessment & Plan:  Her fasting blood sugar 107 and hemoglobin A1c 6 1  Patient advised to watch diet for sugar and carbs intake  Will follow his blood sugar and hemoglobin A1c    Orders:  -     Comprehensive metabolic panel; Future  -     Hemoglobin A1C; Future    8  Hypertriglyceridemia  Assessment & Plan:  Resolved after being on diet    Last cholesterol 184, triglyceride 92, HDL  advised to continue to watch diet for cholesterol saturated fat feel    Orders:  -     Lipid panel; Future          Subjective:  Patient presents for follow-up after she was seen at emergency room on August 10, 2022 after her fall and pain in knees  Patient ID: Herbert Calhoun is a 66 y o  female  HPI   65-year-old female patient accidentally tripped and fell in parking lot of  "[employer] on 08/10/2022  She landed on her knees and hands  She went to emergency room  She had x-rays revealed no fracture  Now her pain is better  She is not taking any pain medication  She also has some abrasion on her both knees for his which she applied bacitracin and also abrasions are  better as well  Denies any chest pain, shortness of breath, pain in abdomen  Denies any headache or focal weakness numbness  Denies any nausea vomiting diarrhea pain abdomen  The following portions of the patient's history were reviewed and updated as appropriate:     Past Medical History:  She has a past medical history of Abrasion of knee, bilateral (2022), Allergic rhinitis (2021), Bilateral hearing loss (2022), Bilateral impacted cerumen (2022), BMI 32 0-32 9,adult (2022), BMI 33 0-33 9,adult (2021), Cataract, bilateral, Constipation, Constipation (2021), Elevated glucose level, Fatigue, History of anemia, Hypertriglyceridemia, Hypertriglyceridemia (2021), Medicare annual wellness visit, subsequent (2021), Obesity, Osteopenia, Osteopenia of multiple sites (2022), Pain in both knees (2022), Pain of left heel (2021), Post-menopausal, Rash, Vitamin D deficiency, and Vitamin D deficiency (2021)  ,  _______________________________________________________________________  Past Surgical History:   has a past surgical history that includes  section; Cataract extraction, bilateral (); Colonoscopy (2014);  Mammo (historical) (2020); and DXA procedure(historical) (01/2020)  ,  _______________________________________________________________________  Family History:  family history includes Diabetes in her brother, brother, mother, sister, and sister; No Known Problems in her brother, brother, daughter, daughter, daughter, daughter, daughter, father, maternal aunt, maternal grandfather, maternal grandmother, paternal aunt, paternal grandfather, paternal grandmother, sister, sister, and son; Other in her mother ,  _______________________________________________________________________  Social History:   reports that she has never smoked  She has never used smokeless tobacco  She reports current alcohol use of about 1 0 standard drink of alcohol per week  She reports that she does not use drugs  ,  _______________________________________________________________________  Allergies:  has No Known Allergies     _______________________________________________________________________  Current Outpatient Medications   Medication Sig Dispense Refill    calcium carbonate (OS-FELICITA) 600 MG tablet Take 600 mg by mouth 2 (two) times a day with meals      cholecalciferol (VITAMIN D3) 1,000 units tablet Take 1,000 Units by mouth daily      fexofenadine (ALLEGRA) 180 MG tablet Take 180 mg by mouth as needed      glucose blood test strip Use 1 each daily as needed Use as instructed      Multiple Vitamin (MULTI-VITAMIN DAILY) TABS Take by mouth      polyethylene glycol (GLYCOLAX) 17 GM/SCOOP powder Take 17 g by mouth daily 507 g 2    senna-docusate sodium (SENOKOT-S) 8 6-50 mg per tablet Take 2 tablets by mouth 2 (two) times a day as needed       No current facility-administered medications for this visit      _______________________________________________________________________  Review of Systems   Constitutional: Negative for chills and fever  HENT: Negative for congestion, ear pain, hearing loss, nosebleeds, sinus pain, sore throat and trouble swallowing      Eyes: Negative for discharge, redness and visual disturbance  Respiratory: Negative for cough, chest tightness and shortness of breath  Cardiovascular: Negative for chest pain and palpitations  Gastrointestinal: Positive for constipation  Negative for abdominal pain, blood in stool, diarrhea, nausea and vomiting  Genitourinary: Negative for dysuria, flank pain and hematuria  Musculoskeletal: Positive for arthralgias (Has minimal discomfort in the knees but significantly better  )  Negative for myalgias and neck pain  Skin: Negative for color change and rash  Neurological: Negative for dizziness, speech difficulty, weakness and headaches  Hematological: Does not bruise/bleed easily  Psychiatric/Behavioral: Negative for agitation and behavioral problems  Objective:  Vitals:    08/31/22 1028   BP: 124/80   BP Location: Right arm   Patient Position: Sitting   Cuff Size: Adult   Pulse: 73   Resp: 14   Temp: 98 3 °F (36 8 °C)   TempSrc: Tympanic   SpO2: 100%   Weight: 85 7 kg (189 lb)   Height: 5' 4" (1 626 m)     Body mass index is 32 44 kg/m²  Physical Exam  Vitals and nursing note reviewed  Constitutional:       General: She is not in acute distress  Appearance: Normal appearance  HENT:      Head: Normocephalic and atraumatic  Right Ear: Ear canal and external ear normal       Left Ear: Ear canal and external ear normal       Nose: Nose normal       Mouth/Throat:      Mouth: Mucous membranes are moist    Eyes:      General: No scleral icterus  Right eye: No discharge  Left eye: No discharge  Extraocular Movements: Extraocular movements intact  Conjunctiva/sclera: Conjunctivae normal    Cardiovascular:      Rate and Rhythm: Normal rate and regular rhythm  Pulses: Normal pulses  Heart sounds: No murmur heard  Pulmonary:      Effort: Pulmonary effort is normal  No respiratory distress  Breath sounds: Normal breath sounds     Abdominal:      General: Bowel sounds are normal       Palpations: Abdomen is soft  Tenderness: There is no abdominal tenderness  Musculoskeletal:         General: Normal range of motion  Cervical back: Normal range of motion and neck supple  No muscular tenderness  Right lower leg: No edema  Left lower leg: No edema  Comments: Both knees has a good range of motion  Has some crepitations  No gross swelling  No pain  She is ambulating without any pain  Skin:     General: Skin is warm  Findings: Lesion (Has a well-healed abrasion on both knee area  No signs of infection  No redness  No discharge ) present  No rash  Neurological:      General: No focal deficit present  Mental Status: She is alert and oriented to person, place, and time  Motor: No weakness  Coordination: Coordination normal    Psychiatric:         Mood and Affect: Mood normal          Behavior: Behavior normal          I spent 30 minutes with the patient today    More than 50% time spent for reviewing of external notes, reviewing of the results of diagnostics test, management of care, patient education and ordering of test

## 2022-10-11 PROBLEM — H61.23 BILATERAL IMPACTED CERUMEN: Status: RESOLVED | Noted: 2022-04-29 | Resolved: 2022-10-11

## 2022-10-12 PROBLEM — Z00.00 MEDICARE ANNUAL WELLNESS VISIT, SUBSEQUENT: Status: RESOLVED | Noted: 2021-12-02 | Resolved: 2022-10-12

## 2022-10-14 ENCOUNTER — CLINICAL SUPPORT (OUTPATIENT)
Dept: INTERNAL MEDICINE CLINIC | Facility: CLINIC | Age: 72
End: 2022-10-14
Payer: COMMERCIAL

## 2022-10-14 DIAGNOSIS — Z23 NEED FOR PROPHYLACTIC VACCINATION AND INOCULATION AGAINST INFLUENZA: Primary | ICD-10-CM

## 2022-10-14 PROCEDURE — 90662 IIV NO PRSV INCREASED AG IM: CPT | Performed by: INTERNAL MEDICINE

## 2022-10-14 PROCEDURE — G0008 ADMIN INFLUENZA VIRUS VAC: HCPCS | Performed by: INTERNAL MEDICINE

## 2022-10-24 ENCOUNTER — TELEPHONE (OUTPATIENT)
Dept: INTERNAL MEDICINE CLINIC | Facility: CLINIC | Age: 72
End: 2022-10-24

## 2022-10-24 NOTE — TELEPHONE ENCOUNTER
Discussed with the patient she got calcium 600 mg vitamin-D tablet so advised take 1 tablet twice a day  Continue vitamin-D supplement same dose as she is taking as well

## 2022-12-06 ENCOUNTER — APPOINTMENT (OUTPATIENT)
Dept: LAB | Facility: CLINIC | Age: 72
End: 2022-12-06

## 2022-12-06 DIAGNOSIS — M85.89 OSTEOPENIA OF MULTIPLE SITES: ICD-10-CM

## 2022-12-06 DIAGNOSIS — R73.9 HYPERGLYCEMIA: ICD-10-CM

## 2022-12-06 DIAGNOSIS — E55.9 VITAMIN D DEFICIENCY: ICD-10-CM

## 2022-12-06 DIAGNOSIS — K59.00 CONSTIPATION, UNSPECIFIED CONSTIPATION TYPE: ICD-10-CM

## 2022-12-06 DIAGNOSIS — E78.1 HYPERTRIGLYCERIDEMIA: ICD-10-CM

## 2022-12-06 LAB
25(OH)D3 SERPL-MCNC: 59.2 NG/ML (ref 30–100)
ALBUMIN SERPL BCP-MCNC: 4.1 G/DL (ref 3.5–5)
ALP SERPL-CCNC: 62 U/L (ref 34–104)
ALT SERPL W P-5'-P-CCNC: 15 U/L (ref 7–52)
ANION GAP SERPL CALCULATED.3IONS-SCNC: 6 MMOL/L (ref 4–13)
AST SERPL W P-5'-P-CCNC: 19 U/L (ref 13–39)
BASOPHILS # BLD AUTO: 0.04 THOUSANDS/ÂΜL (ref 0–0.1)
BASOPHILS NFR BLD AUTO: 1 % (ref 0–1)
BILIRUB SERPL-MCNC: 0.45 MG/DL (ref 0.2–1)
BUN SERPL-MCNC: 12 MG/DL (ref 5–25)
CALCIUM SERPL-MCNC: 9.9 MG/DL (ref 8.4–10.2)
CHLORIDE SERPL-SCNC: 103 MMOL/L (ref 96–108)
CHOLEST SERPL-MCNC: 192 MG/DL
CO2 SERPL-SCNC: 29 MMOL/L (ref 21–32)
CREAT SERPL-MCNC: 0.7 MG/DL (ref 0.6–1.3)
EOSINOPHIL # BLD AUTO: 0.1 THOUSAND/ÂΜL (ref 0–0.61)
EOSINOPHIL NFR BLD AUTO: 2 % (ref 0–6)
ERYTHROCYTE [DISTWIDTH] IN BLOOD BY AUTOMATED COUNT: 15 % (ref 11.6–15.1)
EST. AVERAGE GLUCOSE BLD GHB EST-MCNC: 131 MG/DL
GFR SERPL CREATININE-BSD FRML MDRD: 86 ML/MIN/1.73SQ M
GLUCOSE P FAST SERPL-MCNC: 110 MG/DL (ref 65–99)
HBA1C MFR BLD: 6.2 %
HCT VFR BLD AUTO: 39.3 % (ref 34.8–46.1)
HDLC SERPL-MCNC: 51 MG/DL
HGB BLD-MCNC: 12.3 G/DL (ref 11.5–15.4)
IMM GRANULOCYTES # BLD AUTO: 0.02 THOUSAND/UL (ref 0–0.2)
IMM GRANULOCYTES NFR BLD AUTO: 0 % (ref 0–2)
LDLC SERPL CALC-MCNC: 118 MG/DL (ref 0–100)
LYMPHOCYTES # BLD AUTO: 2.69 THOUSANDS/ÂΜL (ref 0.6–4.47)
LYMPHOCYTES NFR BLD AUTO: 44 % (ref 14–44)
MCH RBC QN AUTO: 25.9 PG (ref 26.8–34.3)
MCHC RBC AUTO-ENTMCNC: 31.3 G/DL (ref 31.4–37.4)
MCV RBC AUTO: 83 FL (ref 82–98)
MONOCYTES # BLD AUTO: 0.49 THOUSAND/ÂΜL (ref 0.17–1.22)
MONOCYTES NFR BLD AUTO: 8 % (ref 4–12)
NEUTROPHILS # BLD AUTO: 2.72 THOUSANDS/ÂΜL (ref 1.85–7.62)
NEUTS SEG NFR BLD AUTO: 45 % (ref 43–75)
NONHDLC SERPL-MCNC: 141 MG/DL
NRBC BLD AUTO-RTO: 0 /100 WBCS
PLATELET # BLD AUTO: 242 THOUSANDS/UL (ref 149–390)
PMV BLD AUTO: 10.2 FL (ref 8.9–12.7)
POTASSIUM SERPL-SCNC: 4.1 MMOL/L (ref 3.5–5.3)
PROT SERPL-MCNC: 7.5 G/DL (ref 6.4–8.4)
RBC # BLD AUTO: 4.74 MILLION/UL (ref 3.81–5.12)
SODIUM SERPL-SCNC: 138 MMOL/L (ref 135–147)
TRIGL SERPL-MCNC: 117 MG/DL
WBC # BLD AUTO: 6.06 THOUSAND/UL (ref 4.31–10.16)

## 2022-12-08 ENCOUNTER — OFFICE VISIT (OUTPATIENT)
Dept: INTERNAL MEDICINE CLINIC | Facility: CLINIC | Age: 72
End: 2022-12-08

## 2022-12-08 VITALS
DIASTOLIC BLOOD PRESSURE: 80 MMHG | HEART RATE: 90 BPM | OXYGEN SATURATION: 98 % | RESPIRATION RATE: 18 BRPM | SYSTOLIC BLOOD PRESSURE: 132 MMHG | HEIGHT: 63 IN | WEIGHT: 186 LBS | TEMPERATURE: 98.2 F | BODY MASS INDEX: 32.96 KG/M2

## 2022-12-08 DIAGNOSIS — E78.1 HYPERTRIGLYCERIDEMIA: ICD-10-CM

## 2022-12-08 DIAGNOSIS — M54.50 CHRONIC BILATERAL LOW BACK PAIN WITHOUT SCIATICA: ICD-10-CM

## 2022-12-08 DIAGNOSIS — G89.29 CHRONIC BILATERAL LOW BACK PAIN WITHOUT SCIATICA: ICD-10-CM

## 2022-12-08 DIAGNOSIS — E55.9 VITAMIN D DEFICIENCY: ICD-10-CM

## 2022-12-08 DIAGNOSIS — M85.89 OSTEOPENIA OF MULTIPLE SITES: ICD-10-CM

## 2022-12-08 DIAGNOSIS — Z00.00 MEDICARE ANNUAL WELLNESS VISIT, SUBSEQUENT: Primary | ICD-10-CM

## 2022-12-08 DIAGNOSIS — R73.9 HYPERGLYCEMIA: ICD-10-CM

## 2022-12-08 DIAGNOSIS — K59.00 CONSTIPATION, UNSPECIFIED CONSTIPATION TYPE: ICD-10-CM

## 2022-12-08 RX ORDER — SENNA AND DOCUSATE SODIUM 50; 8.6 MG/1; MG/1
2 TABLET, FILM COATED ORAL 2 TIMES DAILY
COMMUNITY

## 2022-12-08 NOTE — ASSESSMENT & PLAN NOTE
Gets low back pain particularly when she gets constipated  Denies any injury or fall  Denies pain radiating in the legs or tingling numbness in the legs  She does not take any medication for pain  Discussed with the patient may be possibility of osteoarthritis  Advised for x-ray but she declined  Discussed with the patient may take acetaminophen as needed if necessary for pain

## 2022-12-08 NOTE — ASSESSMENT & PLAN NOTE
She has been taking her calcium and vitamin D supplement  Has a DEXA scan April 2022 revealed osteopenia  Advised for weightbearing exercise and continue present calcium and vitamin D supplement

## 2022-12-08 NOTE — PATIENT INSTRUCTIONS
Medicare Preventive Visit Patient Instructions  Thank you for completing your Welcome to Medicare Visit or Medicare Annual Wellness Visit today  Your next wellness visit will be due in one year (12/9/2023)  The screening/preventive services that you may require over the next 5-10 years are detailed below  Some tests may not apply to you based off risk factors and/or age  Screening tests ordered at today's visit but not completed yet may show as past due  Also, please note that scanned in results may not display below  Preventive Screenings:  Service Recommendations Previous Testing/Comments   Colorectal Cancer Screening  * Colonoscopy    * Fecal Occult Blood Test (FOBT)/Fecal Immunochemical Test (FIT)  * Fecal DNA/Cologuard Test  * Flexible Sigmoidoscopy Age: 39-70 years old   Colonoscopy: every 10 years (may be performed more frequently if at higher risk)  OR  FOBT/FIT: every 1 year  OR  Cologuard: every 3 years  OR  Sigmoidoscopy: every 5 years  Screening may be recommended earlier than age 39 if at higher risk for colorectal cancer  Also, an individualized decision between you and your healthcare provider will decide whether screening between the ages of 74-80 would be appropriate  Colonoscopy: 12/08/2021  FOBT/FIT: Not on file  Cologuard: 12/08/2021  Sigmoidoscopy: Not on file    Screening Current     Breast Cancer Screening Age: 36 years old  Frequency: every 1-2 years  Not required if history of left and right mastectomy Mammogram: 07/07/2022    Screening Current   Cervical Cancer Screening Between the ages of 21-29, pap smear recommended once every 3 years  Between the ages of 33-67, can perform pap smear with HPV co-testing every 5 years     Recommendations may differ for women with a history of total hysterectomy, cervical cancer, or abnormal pap smears in past  Pap Smear: 08/26/2022    Screening Not Indicated   Hepatitis C Screening Once for adults born between 1945 and 1965  More frequently in patients at high risk for Hepatitis C Hep C Antibody: Not on file        Diabetes Screening 1-2 times per year if you're at risk for diabetes or have pre-diabetes Fasting glucose: 110 mg/dL (12/6/2022)  A1C: 6 2 % (12/6/2022)  Screening Current   Cholesterol Screening Once every 5 years if you don't have a lipid disorder  May order more often based on risk factors  Lipid panel: 12/06/2022    Screening Not Indicated  History Lipid Disorder     Other Preventive Screenings Covered by Medicare:  Abdominal Aortic Aneurysm (AAA) Screening: covered once if your at risk  You're considered to be at risk if you have a family history of AAA  Lung Cancer Screening: covers low dose CT scan once per year if you meet all of the following conditions: (1) Age 50-69; (2) No signs or symptoms of lung cancer; (3) Current smoker or have quit smoking within the last 15 years; (4) You have a tobacco smoking history of at least 20 pack years (packs per day multiplied by number of years you smoked); (5) You get a written order from a healthcare provider  Glaucoma Screening: covered annually if you're considered high risk: (1) You have diabetes OR (2) Family history of glaucoma OR (3)  aged 48 and older OR (3)  American aged 72 and older  Osteoporosis Screening: covered every 2 years if you meet one of the following conditions: (1) You're estrogen deficient and at risk for osteoporosis based off medical history and other findings; (2) Have a vertebral abnormality; (3) On glucocorticoid therapy for more than 3 months; (4) Have primary hyperparathyroidism; (5) On osteoporosis medications and need to assess response to drug therapy  Last bone density test (DXA Scan): 04/07/2022  HIV Screening: covered annually if you're between the age of 12-76  Also covered annually if you are younger than 13 and older than 72 with risk factors for HIV infection   For pregnant patients, it is covered up to 3 times per pregnancy  Immunizations:  Immunization Recommendations   Influenza Vaccine Annual influenza vaccination during flu season is recommended for all persons aged >= 6 months who do not have contraindications   Pneumococcal Vaccine   * Pneumococcal conjugate vaccine = PCV13 (Prevnar 13), PCV15 (Vaxneuvance), PCV20 (Prevnar 20)  * Pneumococcal polysaccharide vaccine = PPSV23 (Pneumovax) Adults 25-60 years old: 1-3 doses may be recommended based on certain risk factors  Adults 72 years old: 1-2 doses may be recommended based off what pneumonia vaccine you previously received   Hepatitis B Vaccine 3 dose series if at intermediate or high risk (ex: diabetes, end stage renal disease, liver disease)   Tetanus (Td) Vaccine - COST NOT COVERED BY MEDICARE PART B Following completion of primary series, a booster dose should be given every 10 years to maintain immunity against tetanus  Td may also be given as tetanus wound prophylaxis  Tdap Vaccine - COST NOT COVERED BY MEDICARE PART B Recommended at least once for all adults  For pregnant patients, recommended with each pregnancy  Shingles Vaccine (Shingrix) - COST NOT COVERED BY MEDICARE PART B  2 shot series recommended in those aged 48 and above     Health Maintenance Due:      Topic Date Due    Hepatitis C Screening  Never done    Breast Cancer Screening: Mammogram  07/07/2023    Colorectal Cancer Screening  12/08/2024     Immunizations Due:      Topic Date Due    Hepatitis B Vaccine (1 of 3 - 3-dose series) Never done    Pneumococcal Vaccine: 65+ Years (2 - PPSV23 if available, else PCV20) 10/30/2021     Advance Directives   What are advance directives? Advance directives are legal documents that state your wishes and plans for medical care  These plans are made ahead of time in case you lose your ability to make decisions for yourself  Advance directives can apply to any medical decision, such as the treatments you want, and if you want to donate organs     What are the types of advance directives? There are many types of advance directives, and each state has rules about how to use them  You may choose a combination of any of the following:  Living will: This is a written record of the treatment you want  You can also choose which treatments you do not want, which to limit, and which to stop at a certain time  This includes surgery, medicine, IV fluid, and tube feedings  Durable power of  for healthcare [facility]): This is a written record that states who you want to make healthcare choices for you when you are unable to make them for yourself  This person, called a proxy, is usually a family member or a friend  You may choose more than 1 proxy  Do not resuscitate (DNR) order:  A DNR order is used in case your heart stops beating or you stop breathing  It is a request not to have certain forms of treatment, such as CPR  A DNR order may be included in other types of advance directives  Medical directive: This covers the care that you want if you are in a coma, near death, or unable to make decisions for yourself  You can list the treatments you want for each condition  Treatment may include pain medicine, surgery, blood transfusions, dialysis, IV or tube feedings, and a ventilator (breathing machine)  Values history: This document has questions about your views, beliefs, and how you feel and think about life  This information can help others choose the care that you would choose  Why are advance directives important? An advance directive helps you control your care  Although spoken wishes may be used, it is better to have your wishes written down  Spoken wishes can be misunderstood, or not followed  Treatments may be given even if you do not want them  An advance directive may make it easier for your family to make difficult choices about your care  Fall Prevention    Fall prevention  includes ways to make your home and other areas safer   It also includes ways you can move more carefully to prevent a fall  Health conditions that cause changes in your blood pressure, vision, or muscle strength and coordination may increase your risk for falls  Medicines may also increase your risk for falls if they make you dizzy, weak, or sleepy  Fall prevention tips:   Stand or sit up slowly  Use assistive devices as directed  Wear shoes that fit well and have soles that   Wear a personal alarm  Stay active  Manage your medical conditions  Home Safety Tips:  Add items to prevent falls in the bathroom  Keep paths clear  Install bright lights in your home  Keep items you use often on shelves within reach  [place] or place reflective tape on the edges of your stairs  Weight Management   Why it is important to manage your weight:  Being overweight increases your risk of health conditions such as heart disease, high blood pressure, type 2 diabetes, and certain types of cancer  It can also increase your risk for osteoarthritis, sleep apnea, and other respiratory problems  Aim for a slow, steady weight loss  Even a small amount of weight loss can lower your risk of health problems  How to lose weight safely:  A safe and healthy way to lose weight is to eat fewer calories and get regular exercise  You can lose up about 1 pound a week by decreasing the number of calories you eat by 500 calories each day  Healthy meal plan for weight management:  A healthy meal plan includes a variety of foods, contains fewer calories, and helps you stay healthy  A healthy meal plan includes the following:  Eat whole-grain foods more often  A healthy meal plan should contain fiber  Fiber is the part of grains, fruits, and vegetables that is not broken down by your body  Whole-grain foods are healthy and provide extra fiber in your diet  Some examples of whole-grain foods are whole-wheat breads and pastas, oatmeal, brown rice, and bulgur    Eat a variety of vegetables every day  Include dark, leafy greens such as spinach, kale, logan greens, and mustard greens  Eat yellow and orange vegetables such as carrots, sweet potatoes, and winter squash  Eat a variety of fruits every day  Choose fresh or canned fruit (canned in its own juice or light syrup) instead of juice  Fruit juice has very little or no fiber  Eat low-fat dairy foods  Drink fat-free (skim) milk or 1% milk  Eat fat-free yogurt and low-fat cottage cheese  Try low-fat cheeses such as mozzarella and other reduced-fat cheeses  Choose meat and other protein foods that are low in fat  Choose beans or other legumes such as split peas or lentils  Choose fish, skinless poultry (chicken or turkey), or lean cuts of red meat (beef or pork)  Before you cook meat or poultry, cut off any visible fat  Use less fat and oil  Try baking foods instead of frying them  Add less fat, such as margarine, sour cream, regular salad dressing and mayonnaise to foods  Eat fewer high-fat foods  Some examples of high-fat foods include french fries, doughnuts, ice cream, and cakes  Eat fewer sweets  Limit foods and drinks that are high in sugar  This includes candy, cookies, regular soda, and sweetened drinks  Exercise:  Exercise at least 30 minutes per day on most days of the week  Some examples of exercise include walking, biking, dancing, and swimming  You can also fit in more physical activity by taking the stairs instead of the elevator or parking farther away from stores  Ask your healthcare provider about the best exercise plan for you  © Copyright [employer] 2018 Information is for End User's use only and may not be sold, redistributed or otherwise used for commercial purposes  All illustrations and images included in CareNotes® are the copyrighted property of A D A M , Inc  or [facility] Schmitz  Patient was advised to continue present medications  discussed with the patient medications and laboratory data in detail  Follow-up with me in 12 months or as advised  If any blood test was ordered please do 1 week prior to next appointment unless advise to get earlier    If you have any questions please call the office [phone]

## 2022-12-08 NOTE — ASSESSMENT & PLAN NOTE
Her fasting blood sugar 110 and hemoglobin A1c 6 2 consistent with prediabetes  Patient was advised to watch diet for sugar, carbs and lose weight and exercise

## 2022-12-08 NOTE — ASSESSMENT & PLAN NOTE
Diet controlled  Her cholesterol increasing from [phone]  Triglyceride 117  DL 51 LDL increased from 100-118  Advised to watch diet for cholesterol, sugar, carbs

## 2022-12-08 NOTE — ASSESSMENT & PLAN NOTE
Vitamin D deficiency resolved on vitamin D supplement  Vitamin D level 47 advised to continue vitamin D 1000 IU daily

## 2022-12-08 NOTE — ASSESSMENT & PLAN NOTE
Her constipation is controlled on Senokot 2 tablets twice a day  She takes Metamucil as needed  Advised to increase fluids and fibers

## 2022-12-08 NOTE — PROGRESS NOTES
Assessment/Plan:    1  Medicare annual wellness visit, subsequent    2  Osteopenia of multiple sites  Assessment & Plan:  She has been taking her calcium and vitamin D supplement  Has a DEXA scan April 2022 revealed osteopenia  Advised for weightbearing exercise and continue present calcium and vitamin D supplement  3  Constipation, unspecified constipation type  Assessment & Plan:  Her constipation is controlled on Senokot 2 tablets twice a day  She takes Metamucil as needed  Advised to increase fluids and fibers  4  BMI 32 0-32 9,adult  Assessment & Plan:  Patient  was advised to decrease portion size  Advised to decrease carb, sugar, cholesterol intake  Advised to exercise 3-5 times per week  Advised to lose weight  5  Vitamin D deficiency  Assessment & Plan:  Vitamin D deficiency resolved on vitamin D supplement  Vitamin D level 47 advised to continue vitamin D 1000 IU daily  6  Hypertriglyceridemia  Assessment & Plan:  Diet controlled  Her cholesterol increasing from [phone]  Triglyceride 117  DL 51 LDL increased from 100-118  Advised to watch diet for cholesterol, sugar, carbs  7  Hyperglycemia  Assessment & Plan:  Her fasting blood sugar 110 and hemoglobin A1c 6 2 consistent with prediabetes  Patient was advised to watch diet for sugar, carbs and lose weight and exercise  8  Chronic bilateral low back pain without sciatica  Assessment & Plan:  Gets low back pain particularly when she gets constipated  Denies any injury or fall  Denies pain radiating in the legs or tingling numbness in the legs  She does not take any medication for pain  Discussed with the patient may be possibility of osteoarthritis  Advised for x-ray but she declined  Discussed with the patient may take acetaminophen as needed if necessary for pain  Subjective: Patient presents for annual wellness exam as well as follow-up of her medical problems        Patient ID: Nina Vazquez is a 67 y o  female  HPI   69-year-old female patient presents for annual wellness exam as well as follow-up her medical problems  He denies any chest pain, shortness of breath, pain in abdomen  Denies any cough, fever, chills  Nausea vomiting diarrhea  Sleeplessness controlled on present medications  Times he gets the low back pain mainly when she gets constipated  Any fall or injury  Pain is not radiating in the legs  Denies any tingling numbness in the legs, does not take any medication for low back pain  Low back pain is intermittent not daily  The following portions of the patient's history were reviewed and updated as appropriate:     Past Medical History:  She has a past medical history of Abrasion of knee, bilateral (2022), Allergic rhinitis (2021), Bilateral hearing loss (2022), Bilateral impacted cerumen (2022), BMI 32 0-32 9,adult (2022), BMI 33 0-33 9,adult (2021), Cataract, bilateral, Chronic bilateral low back pain without sciatica (2022), Constipation, Constipation (2021), Elevated glucose level, Fatigue, History of anemia, Hypertriglyceridemia, Hypertriglyceridemia (2021), Medicare annual wellness visit, subsequent (2021), Medicare annual wellness visit, subsequent (2022), Obesity, Osteopenia, Osteopenia of multiple sites (2022), Pain in both knees (2022), Pain of left heel (2021), Post-menopausal, Rash, Vitamin D deficiency, and Vitamin D deficiency (2021)  ,  _______________________________________________________________________  Past Surgical History:   has a past surgical history that includes  section; Cataract extraction, bilateral (); Colonoscopy (2014); Mammo (historical) (2020); and DXA procedure(historical) (2020)  ,  _______________________________________________________________________  Family History:  family history includes Diabetes in her brother, brother, mother, sister, and sister; No Known Problems in her brother, brother, daughter, daughter, daughter, daughter, daughter, father, maternal aunt, maternal grandfather, maternal grandmother, paternal aunt, paternal grandfather, paternal grandmother, sister, sister, and son; Other in her mother ,  _______________________________________________________________________  Social History:   reports that she has never smoked  She has never used smokeless tobacco  She reports current alcohol use of about 1 0 standard drink per week  She reports that she does not use drugs  ,  _______________________________________________________________________  Allergies:  has No Known Allergies     _______________________________________________________________________  Current Outpatient Medications   Medication Sig Dispense Refill   • calcium carbonate (OS-FELICITA) 600 MG tablet Take 600 mg by mouth 2 (two) times a day with meals     • cholecalciferol (VITAMIN D3) 1,000 units tablet Take 1,000 Units by mouth daily     • fexofenadine (ALLEGRA) 180 MG tablet Take 180 mg by mouth as needed     • glucose blood test strip Use 1 each daily as needed Use as instructed     • Multiple Vitamin (MULTI-VITAMIN DAILY) TABS Take by mouth     • psyllium (METAMUCIL) 58 6 % packet Take 1 packet by mouth as needed     • senna-docusate sodium (SENOKOT-S) 8 6-50 mg per tablet Take 2 tablets by mouth 2 (two) times a day       No current facility-administered medications for this visit      _______________________________________________________________________  Review of Systems   Constitutional: Negative for chills, fatigue and fever  HENT: Negative for congestion, ear pain, hearing loss, nosebleeds, sinus pain, sore throat and trouble swallowing  Eyes: Negative for discharge, redness and visual disturbance  Respiratory: Negative for cough, chest tightness and shortness of breath  Cardiovascular: Negative for chest pain and palpitations     Gastrointestinal: Negative for abdominal pain, blood in stool, constipation, diarrhea, nausea and vomiting  Genitourinary: Negative for dysuria, flank pain, frequency and hematuria  Musculoskeletal: Positive for back pain ( Low back pain)  Negative for arthralgias, myalgias and neck pain  Skin: Negative for color change and rash  Neurological: Negative for dizziness, speech difficulty, weakness and headaches  Hematological: Does not bruise/bleed easily  Psychiatric/Behavioral: Negative for agitation and behavioral problems  Objective:  Vitals:    12/08/22 1032   BP: 132/80   BP Location: Left arm   Patient Position: Sitting   Cuff Size: Adult   Pulse: 90   Resp: 18   Temp: 98 2 °F (36 8 °C)   TempSrc: Tympanic   SpO2: 98%   Weight: 84 4 kg (186 lb)   Height: 5' 3" (1 6 m)     Body mass index is 32 95 kg/m²  Physical Exam  Vitals and nursing note reviewed  Constitutional:       General: She is not in acute distress  Appearance: Normal appearance  HENT:      Head: Normocephalic and atraumatic  Right Ear: Ear canal and external ear normal       Left Ear: Ear canal and external ear normal       Nose: Nose normal       Mouth/Throat:      Mouth: Mucous membranes are moist    Eyes:      General: No scleral icterus  Right eye: No discharge  Left eye: No discharge  Extraocular Movements: Extraocular movements intact  Conjunctiva/sclera: Conjunctivae normal    Cardiovascular:      Rate and Rhythm: Normal rate and regular rhythm  Pulses: Normal pulses  Heart sounds: No murmur heard  Pulmonary:      Effort: Pulmonary effort is normal  No respiratory distress  Breath sounds: Normal breath sounds  Abdominal:      General: Bowel sounds are normal       Palpations: Abdomen is soft  Tenderness: There is no abdominal tenderness  Musculoskeletal:         General: No tenderness  Normal range of motion  Cervical back: Normal range of motion and neck supple   No muscular tenderness  Right lower leg: No edema  Left lower leg: No edema  Skin:     General: Skin is warm  Findings: No rash  Neurological:      General: No focal deficit present  Mental Status: She is alert and oriented to person, place, and time  Motor: No weakness        Coordination: Coordination normal    Psychiatric:         Mood and Affect: Mood normal          Behavior: Behavior normal

## 2022-12-08 NOTE — PROGRESS NOTES
Assessment and Plan:     Problem List Items Addressed This Visit    None       Preventive health issues were discussed with patient, and age appropriate screening tests were ordered as noted in patient's After Visit Summary  Personalized health advice and appropriate referrals for health education or preventive services given if needed, as noted in patient's After Visit Summary       History of Present Illness:     Patient presents for a Medicare Wellness Visit    HPI   Patient Care Team:  Yandel Andrade MD as PCP - General (Internal Medicine)     Review of Systems:     Review of Systems     Problem List:     Patient Active Problem List   Diagnosis   • Encounter for gynecological examination (general) (routine) without abnormal findings   • Vitamin D deficiency   • Hypertriglyceridemia   • Hyperglycemia   • Constipation   • Allergic rhinitis   • Pain of left heel   • Encounter for screening for colorectal malignant neoplasm in high risk patient   • BMI 33 0-33 9,adult   • Bilateral hearing loss   • Osteopenia of multiple sites   • BMI 32 0-32 9,adult   • Pain in both knees   • Abrasion of knee, bilateral      Past Medical and Surgical History:     Past Medical History:   Diagnosis Date   • Abrasion of knee, bilateral 8/31/2022   • Allergic rhinitis 12/2/2021   • Bilateral hearing loss 4/29/2022   • Bilateral impacted cerumen 4/29/2022   • BMI 32 0-32 9,adult 4/29/2022   • BMI 33 0-33 9,adult 12/2/2021   • Cataract, bilateral    • Constipation    • Constipation 12/2/2021   • Elevated glucose level    • Fatigue    • History of anemia    • Hypertriglyceridemia    • Hypertriglyceridemia 12/2/2021   • Medicare annual wellness visit, subsequent 12/2/2021   • Obesity    • Osteopenia    • Osteopenia of multiple sites 4/29/2022   • Pain in both knees 8/31/2022   • Pain of left heel 12/2/2021   • Post-menopausal    • Rash    • Vitamin D deficiency    • Vitamin D deficiency 12/2/2021     Past Surgical History: Procedure Laterality Date   • CATARACT EXTRACTION, BILATERAL     •  SECTION     • COLONOSCOPY  2014    Advise f/u colonoscopy; see by [facility] 2020 at office; she is supposed to have a colonoscopy, but due to Covid 19, she wants to hold for now    • DXA PROCEDURE (HISTORICAL)  2020   • MAMMO (HISTORICAL)  2020      Family History:     Family History   Problem Relation Age of Onset   • Diabetes Mother    • Other Mother         Memory impairment, at old age   • No Known Problems Father    • Diabetes Sister    • Diabetes Brother    • No Known Problems Daughter    • No Known Problems Son    • No Known Problems Maternal Grandmother    • No Known Problems Maternal Grandfather    • No Known Problems Paternal Grandmother    • No Known Problems Paternal Grandfather    • Diabetes Sister    • No Known Problems Sister    • No Known Problems Sister    • Diabetes Brother    • No Known Problems Brother    • No Known Problems Brother    • No Known Problems Daughter    • No Known Problems Daughter    • No Known Problems Daughter    • No Known Problems Daughter    • No Known Problems Maternal Aunt    • No Known Problems Paternal Aunt       Social History:     Social History     Socioeconomic History   • Marital status: /Civil Union     Spouse name: None   • Number of children: None   • Years of education: None   • Highest education level: None   Occupational History   • None   Tobacco Use   • Smoking status: Never   • Smokeless tobacco: Never   Vaping Use   • Vaping Use: Never used   Substance and Sexual Activity   • Alcohol use:  Yes     Alcohol/week: 1 0 standard drink     Types: 1 Cans of beer per week     Comment: occasional   • Drug use: No   • Sexual activity: Not Currently     Partners: Male   Other Topics Concern   • None   Social History Narrative    Lives with spouse    Pap smear: She goes to her gyn for pap smear    Annual eye exam: Advise     Social Determinants of Health Financial Resource Strain: Not on file   Food Insecurity: Not on file   Transportation Needs: Not on file   Physical Activity: Not on file   Stress: Not on file   Social Connections: Not on file   Intimate Partner Violence: Not on file   Housing Stability: Not on file      Medications and Allergies:     Current Outpatient Medications   Medication Sig Dispense Refill   • calcium carbonate (OS-FELICITA) 600 MG tablet Take 600 mg by mouth 2 (two) times a day with meals     • cholecalciferol (VITAMIN D3) 1,000 units tablet Take 1,000 Units by mouth daily     • fexofenadine (ALLEGRA) 180 MG tablet Take 180 mg by mouth as needed     • glucose blood test strip Use 1 each daily as needed Use as instructed     • Multiple Vitamin (MULTI-VITAMIN DAILY) TABS Take by mouth     • polyethylene glycol (GLYCOLAX) 17 GM/SCOOP powder Take 17 g by mouth daily 507 g 2   • senna-docusate sodium (SENOKOT-S) 8 6-50 mg per tablet Take 2 tablets by mouth 2 (two) times a day as needed       No current facility-administered medications for this visit  No Known Allergies   Immunizations:     Immunization History   Administered Date(s) Administered   • COVID-19 PFIZER VACCINE 0 3 ML IM 02/08/2021, 02/28/2021, 10/02/2021   • COVID-19 Pfizer vac (Rhett-sucrose, gray cap) 12 yr+ IM 05/25/2022   • Influenza, high dose seasonal 0 7 mL 11/01/2021, 10/14/2022      Health Maintenance:         Topic Date Due   • Hepatitis C Screening  Never done   • Breast Cancer Screening: Mammogram  07/07/2023   • Colorectal Cancer Screening  12/08/2024         Topic Date Due   • Hepatitis B Vaccine (1 of 3 - 3-dose series) Never done   • Pneumococcal Vaccine: 65+ Years (2 - PPSV23 if available, else PCV20) 10/30/2021      Medicare Screening Tests and Risk Assessments:     Harish Erickson is here for her Subsequent Wellness visit  Last Medicare Wellness visit information reviewed, patient interviewed and updates made to the record today        Health Risk Assessment:   Patient rates overall health as good  Patient feels that their physical health rating is same  Patient is satisfied with their life  Eyesight was rated as slightly worse  Hearing was rated as same  Patient feels that their emotional and mental health rating is same  Patients states they are never, rarely angry  Patient states they are never, rarely unusually tired/fatigued  Pain experienced in the last 7 days has been none  Patient states that she has experienced no weight loss or gain in last 6 months  Fall Risk Screening: In the past year, patient has experienced: history of falling in past year    Injured during fall?: Yes    Feels unsteady when standing or walking?: No    Worried about falling?: No      Urinary Incontinence Screening:   Patient has not leaked urine accidently in the last six months  Home Safety:  Patient does not have trouble with stairs inside or outside of their home  Patient has working smoke alarms and has working carbon monoxide detector  Home safety hazards include: none  Nutrition:   Current diet is Low Carb  Medications:   Patient is currently taking over-the-counter supplements  OTC medications include: see medication list  Patient is able to manage medications  Activities of Daily Living (ADLs)/Instrumental Activities of Daily Living (IADLs):   Walk and transfer into and out of bed and chair?: Yes  Dress and groom yourself?: Yes    Bathe or shower yourself?: Yes    Feed yourself?  Yes  Do your laundry/housekeeping?: Yes  Manage your money, pay your bills and track your expenses?: Yes  Make your own meals?: Yes    Do your own shopping?: Yes    Previous Hospitalizations:   Any hospitalizations or ED visits within the last 12 months?: No      Advance Care Planning:   Living will: No    Durable POA for healthcare: No    Advanced directive: No    Advanced directive counseling given: Yes    Five wishes given: Yes    Patient declined ACP directive: No    End of Life Decisions reviewed with patient: Yes      PREVENTIVE SCREENINGS      Cardiovascular Screening:    General: History Lipid Disorder and Screening Current      Diabetes Screening:     General: Screening Current      Colorectal Cancer Screening:     General: Screening Current      Breast Cancer Screening:     General: Screening Current      Cervical Cancer Screening:    General: Screening Current      Osteoporosis Screening:    General: Screening Current      Abdominal Aortic Aneurysm (AAA) Screening:        General: Screening Not Indicated      Lung Cancer Screening:     General: Screening Not Indicated      Hepatitis C Screening:    General: Risks and Benefits Discussed    Screening, Brief Intervention, and Referral to Treatment (SBIRT)    Screening  Typical number of drinks in a day: 0  Typical number of drinks in a week: 0  Interpretation: Low risk drinking behavior  Other Counseling Topics:   Car/seat belt/driving safety, skin self-exam, sunscreen and calcium and vitamin D intake and regular weightbearing exercise  No results found       Physical Exam:     /80 (BP Location: Left arm, Patient Position: Sitting, Cuff Size: Adult)   Pulse 90   Temp 98 2 °F (36 8 °C) (Tympanic)   Resp 18   Ht 5' 3" (1 6 m)   Wt 84 4 kg (186 lb)   SpO2 98%   BMI 32 95 kg/m²     Physical Exam     Davide Scales MD

## 2023-02-06 PROBLEM — Z00.00 MEDICARE ANNUAL WELLNESS VISIT, SUBSEQUENT: Status: RESOLVED | Noted: 2022-12-08 | Resolved: 2023-02-06

## 2023-02-17 ENCOUNTER — TELEPHONE (OUTPATIENT)
Dept: INTERNAL MEDICINE CLINIC | Facility: CLINIC | Age: 73
End: 2023-02-17

## 2023-02-17 DIAGNOSIS — K59.04 CHRONIC IDIOPATHIC CONSTIPATION: Primary | ICD-10-CM

## 2023-02-17 RX ORDER — POLYETHYLENE GLYCOL 3350 17 G/17G
17 POWDER, FOR SOLUTION ORAL DAILY
Qty: 510 G | Refills: 3 | Status: SHIPPED | OUTPATIENT
Start: 2023-02-17

## 2023-02-17 NOTE — TELEPHONE ENCOUNTER
Pt called in requesting a medication for constipation  Pt stated you previously prescribed this for her but she does not remember the name

## 2023-05-04 ENCOUNTER — TELEPHONE (OUTPATIENT)
Dept: INTERNAL MEDICINE CLINIC | Facility: CLINIC | Age: 73
End: 2023-05-04

## 2023-09-01 ENCOUNTER — HOSPITAL ENCOUNTER (OUTPATIENT)
Dept: RADIOLOGY | Age: 73
Discharge: HOME | End: 2023-09-01
Payer: COMMERCIAL

## 2023-09-01 VITALS — HEIGHT: 63 IN | WEIGHT: 186 LBS | BODY MASS INDEX: 32.96 KG/M2

## 2023-09-01 DIAGNOSIS — Z12.31 ENCOUNTER FOR SCREENING MAMMOGRAM FOR MALIGNANT NEOPLASM OF BREAST: ICD-10-CM

## 2023-09-01 PROCEDURE — 77063 BREAST TOMOSYNTHESIS BI: CPT

## 2023-09-01 PROCEDURE — 77067 SCR MAMMO BI INCL CAD: CPT

## 2023-09-06 ENCOUNTER — TELEPHONE (OUTPATIENT)
Dept: OBGYN CLINIC | Facility: CLINIC | Age: 73
End: 2023-09-06

## 2023-11-17 ENCOUNTER — IMMUNIZATIONS (OUTPATIENT)
Dept: INTERNAL MEDICINE CLINIC | Facility: CLINIC | Age: 73
End: 2023-11-17
Payer: COMMERCIAL

## 2023-11-17 DIAGNOSIS — Z23 ENCOUNTER FOR IMMUNIZATION: Primary | ICD-10-CM

## 2023-11-17 PROCEDURE — 90662 IIV NO PRSV INCREASED AG IM: CPT | Performed by: INTERNAL MEDICINE

## 2023-11-17 PROCEDURE — G0008 ADMIN INFLUENZA VIRUS VAC: HCPCS | Performed by: INTERNAL MEDICINE

## 2023-12-04 ENCOUNTER — RA CDI HCC (OUTPATIENT)
Dept: OTHER | Facility: HOSPITAL | Age: 73
End: 2023-12-04

## 2023-12-04 ENCOUNTER — TELEMEDICINE (OUTPATIENT)
Dept: INTERNAL MEDICINE CLINIC | Facility: CLINIC | Age: 73
End: 2023-12-04
Payer: COMMERCIAL

## 2023-12-04 DIAGNOSIS — U07.1 COVID-19: Primary | ICD-10-CM

## 2023-12-04 PROCEDURE — 99442 PR PHYS/QHP TELEPHONE EVALUATION 11-20 MIN: CPT | Performed by: INTERNAL MEDICINE

## 2023-12-04 RX ORDER — NIRMATRELVIR AND RITONAVIR 300-100 MG
3 KIT ORAL 2 TIMES DAILY
Qty: 30 TABLET | Refills: 0 | Status: SHIPPED | OUTPATIENT
Start: 2023-12-04 | End: 2023-12-09

## 2023-12-04 NOTE — PROGRESS NOTES
Virtual Brief Visit    Visit was attempted to perform by using audio and video component. Unable to connect to video component so visit was performed by using only audio component on the phone. This Visit is being completed by telephone. The Patient is located at Home and in the following state in which I hold an active license [place]    The patient was identified by name and date of birth. Duncan Hodge was informed that this is a telemedicine visit and that the visit is being conducted through Telephone. My office door was closed. No one else was in the room. She acknowledged consent and understanding of privacy and security of the video platform. The patient has agreed to participate and understands they can discontinue the visit at any time. Patient is aware this is a billable service. Assessment/Plan:    Problem List Items Addressed This Visit          Other    COVID-19 - Primary     She developed sore throat, cough, headache, body ache since last 3 days. She did COVID-19 test today and it was positive. Denies any chest pain, shortness of breath. Denies any nausea vomiting diarrhea. He did not got her last updated COVID-19 vaccination. She would like to start medication for COVID-19. So I prescribed Paxlovid. Continue vitamin D supplement. She has been taking Robitussin DM and NyQuil for chest congestion which she can continue as needed. Advised to quarantine for 5 days. After 5 days if she goes out with a mask for 5 more days. Advised to maintain hydration. Take acetaminophen as needed for body ache or fever. If develop chest pain or shortness of breath advised to go to emergency room. Relevant Medications    nirmatrelvir & ritonavir (Paxlovid, 300/100,) tablet therapy pack       Recent Visits  No visits were found meeting these conditions.   Showing recent visits within past 7 days and meeting all other requirements  Today's Visits  Date Type Provider Dept   12/04/23 Telemedicine Kenny Salgado MD [address] [facility]   Showing today's visits and meeting all other requirements  Future Appointments  No visits were found meeting these conditions. Showing future appointments within next 150 days and meeting all other requirements     Subjective: Complaint of sore throat, cough, headache, body ache for last 3 days. HPI:  78-year-old female patient developed sore throat, cough, headache, body ache for last 3 days. She checked a COVID-19 test today at home which was positive. She denies any chest pain or shortness of breath denies nausea vomiting diarrhea or pain abdomen. Her  has a similar symptoms and also positive for COVID-19 infection as well. She did not get her COVID-19 last updated vaccination. she would like to start medication for COVID-19 infection. Objective:  She was awake, alert oriented x 3. Not in acute distress.     Visit Time  Total Visit Duration: 15 minutes

## 2023-12-04 NOTE — PROGRESS NOTES
[address] [facility] coding opportunities       Chart reviewed, no opportunity found: [id number]        Patients Insurance     Medicare Insurance: [employer]

## 2023-12-04 NOTE — ASSESSMENT & PLAN NOTE
She developed sore throat, cough, headache, body ache since last 3 days. She did COVID-19 test today and it was positive. Denies any chest pain, shortness of breath. Denies any nausea vomiting diarrhea. He did not got her last updated COVID-19 vaccination. She would like to start medication for COVID-19. So I prescribed Paxlovid. Continue vitamin D supplement. She has been taking Robitussin DM and NyQuil for chest congestion which she can continue as needed. Advised to quarantine for 5 days. After 5 days if she goes out with a mask for 5 more days. Advised to maintain hydration. Take acetaminophen as needed for body ache or fever. If develop chest pain or shortness of breath advised to go to emergency room.

## 2023-12-11 ENCOUNTER — TELEPHONE (OUTPATIENT)
Dept: INTERNAL MEDICINE CLINIC | Facility: CLINIC | Age: 73
End: 2023-12-11

## 2023-12-11 NOTE — TELEPHONE ENCOUNTER
Pt called back. I was unsure how far out they wanted to reschedule her. Called the office, spoke to Delgado. She said to transfer the pt over to reschedule.

## 2023-12-11 NOTE — TELEPHONE ENCOUNTER
I rs'd patient to 12/20 - she is however have an issue with constipation. Has tried OTC Senikot. Not working.

## 2023-12-13 ENCOUNTER — RA CDI HCC (OUTPATIENT)
Dept: OTHER | Facility: HOSPITAL | Age: 73
End: 2023-12-13

## 2023-12-13 NOTE — PROGRESS NOTES
[address] [facility] coding opportunities       Chart reviewed, no opportunity found: [id number]        Patients Insurance     Medicare Insurance: [employer]

## 2023-12-18 NOTE — PROGRESS NOTES
Assessment and Plan:     Problem List Items Addressed This Visit          Nervous and Auditory    Chronic right-sided low back pain with right-sided sciatica     Patient complain of right-sided low back pain radiating to right leg on and off on certain position.  Most likely radiculopathy.  Patient denies any fall or injury.  Will obtain x-ray of the lumbar spine.  Will start on Medrol Dosepak and physical therapy.  She was advised she may take also acetaminophen as needed for pain.  Discussed with the patient if not better will refer to specialist.         Relevant Medications    methylPREDNISolone 4 MG tablet therapy pack    Other Relevant Orders    XR spine lumbar minimum 4 views non injury    Ambulatory Referral to Physical Therapy       Musculoskeletal and Integument    Osteopenia of multiple sites     Last DEXA scan April 2022 revealed osteopenia.  She is on calcium 600 mg twice daily and also vitamin D supplement 1000 IU daily.  Advised for weightbearing exercise.         Relevant Orders    CBC and differential    Comprehensive metabolic panel       Other    Vitamin D deficiency     Vitamin D deficient resolved.  Vitamin D level 59 advised to continue vitamin D 1000 IU daily.         Relevant Orders    Comprehensive metabolic panel    Hypertriglyceridemia     Her last cholesterol 192, triglyceride 170, HDL 51, .  Advised for low-cholesterol low-carb diet.  Will follow lipid panel.         Relevant Orders    Lipid panel    Constipation     She has a chronic constipation.  She takes Metamucil and Senokot despite at times she is still gets constipation.  Has a colonoscopy more than 10 years ago.  Discussed with the patient to see GI specialist for further evaluation recommendation.  To maintain hydration and fiber.         Relevant Orders    CBC and differential    Comprehensive metabolic panel    TSH, 3rd generation    Ambulatory Referral to Gastroenterology    COVID-19     Recently she had a COVID-19  infection was treated with Paxlovid.  Now her symptoms are resolved.  Denies any chest pain, shortness of breath, fever, chills, cough.         Medicare annual wellness visit, subsequent - Primary    Relevant Orders    CBC and differential    Comprehensive metabolic panel    UA (URINE) with reflex to Scope    Prediabetes     Last blood sugar 110 and hemoglobin A1c 6.2.  Advised to watch diet for sugar and carbs intake.  Will follow blood sugar and hemoglobin A1c.         Relevant Orders    CBC and differential    Comprehensive metabolic panel    Hemoglobin A1C    UA (URINE) with reflex to Scope    BMI 31.0-31.9,adult     BMI Counseling: Body mass index is 31.53 kg/m². The BMI is above normal. Nutrition recommendations include decreasing portion sizes, decreasing fast food intake, consuming healthier snacks, limiting drinks that contain sugar, moderation in carbohydrate intake, reducing intake of saturated and trans fat and reducing intake of cholesterol. Exercise recommendations include exercising 3-5 times per week. No pharmacotherapy was ordered. Rationale for BMI follow-up plan is due to patient being overweight or obese.     Depression Screening and Follow-up Plan: Patient was screened for depression during today's encounter. They screened negative with a PHQ-2 score of 0.      Preventive health issues were discussed with patient, and age appropriate screening tests were ordered as noted in patient's After Visit Summary.  Personalized health advice and appropriate referrals for health education or preventive services given if needed, as noted in patient's After Visit Summary.     History of Present Illness:     Patient presents for a Medicare Wellness Visit    HPI   73-year-old female patient presents for follow-up of her medical problems as well as annual wellness exam.  She had a COVID-19 infection recently which is all better after she took Paxlovid.  Now her symptoms are all resolved.  She denies any chest  pain, shortness of breath.  She denies any cough, fever, chills denies ear pain abdomen.  She has a chronic constipation problem. complain of right-sided lower back pain going in the right leg on and off on certain position.  She denies any fall or injury.    Patient Care Team:  Nicola Marquez MD as PCP - General (Internal Medicine)     Review of Systems:     Review of Systems   Constitutional:  Negative for chills and fever.   HENT:  Negative for ear pain and sore throat.    Eyes:  Negative for pain and visual disturbance.   Respiratory:  Negative for cough and shortness of breath.    Cardiovascular:  Negative for chest pain and palpitations.   Gastrointestinal:  Negative for abdominal pain and vomiting.   Genitourinary:  Negative for dysuria and hematuria.   Musculoskeletal:  Negative for arthralgias and back pain.   Skin:  Negative for color change and rash.   Neurological:  Negative for seizures and syncope.   All other systems reviewed and are negative.         Problem List:     Patient Active Problem List   Diagnosis    Encounter for gynecological examination (general) (routine) without abnormal findings    Vitamin D deficiency    Hypertriglyceridemia    Hyperglycemia    Constipation    Allergic rhinitis    Pain of left heel    Encounter for screening for colorectal malignant neoplasm in high risk patient    BMI 33.0-33.9,adult    Bilateral hearing loss    Osteopenia of multiple sites    BMI 32.0-32.9,adult    Pain in both knees    Abrasion of knee, bilateral    Chronic bilateral low back pain without sciatica    COVID-19    Medicare annual wellness visit, subsequent    Prediabetes    BMI 31.0-31.9,adult    Chronic right-sided low back pain with right-sided sciatica      Past Medical and Surgical History:     Past Medical History:   Diagnosis Date    Abrasion of knee, bilateral 08/31/2022    Allergic rhinitis 12/02/2021    Bilateral hearing loss 04/29/2022    Bilateral impacted cerumen  2022    BMI 31.0-31.9,adult 2023    BMI 32.0-32.9,adult 2022    BMI 33.0-33.9,adult 2021    Cataract, bilateral     Chronic bilateral low back pain without sciatica 2022    Chronic right-sided low back pain with right-sided sciatica 2023    Constipation     Constipation 2021    COVID-19 2023    Elevated glucose level     Fatigue     History of anemia     Hypertriglyceridemia     Hypertriglyceridemia 2021    Medicare annual wellness visit, subsequent 2021    Medicare annual wellness visit, subsequent 2022    Medicare annual wellness visit, subsequent 2023    Obesity     Osteopenia     Osteopenia of multiple sites 2022    Pain in both knees 2022    Pain of left heel 2021    Post-menopausal     Prediabetes 2023    Rash     Vitamin D deficiency     Vitamin D deficiency 2021     Past Surgical History:   Procedure Laterality Date    CATARACT EXTRACTION, BILATERAL  2018     SECTION      COLONOSCOPY  2014    Advise f/u colonoscopy; [facility]; she is supposed to have a colonoscopy, but due to Covid 19, she wants to hold for now     DXA PROCEDURE (HISTORICAL)  2020    MAMMO (HISTORICAL)  2020      Family History:     Family History   Problem Relation Age of Onset    Diabetes Mother     Other Mother         Memory impairment, at old age    No Known Problems Father     Diabetes Sister     Diabetes Sister     No Known Problems Sister     No Known Problems Sister     No Known Problems Daughter     No Known Problems Daughter     No Known Problems Daughter     No Known Problems Daughter     No Known Problems Daughter     No Known Problems Maternal Grandmother     No Known Problems Maternal Grandfather     No Known Problems Paternal Grandmother     No Known Problems Paternal Grandfather     Diabetes Brother     Diabetes Brother     No Known Problems Brother     No Known Problems  Brother     No Known Problems Son     No Known Problems Maternal Aunt     No Known Problems Paternal Aunt       Social History:     Social History     Socioeconomic History    Marital status: /Civil Union     Spouse name: None    Number of children: None    Years of education: None    Highest education level: None   Occupational History    None   Tobacco Use    Smoking status: Never    Smokeless tobacco: Never   Vaping Use    Vaping status: Never Used   Substance and Sexual Activity    Alcohol use: Yes     Alcohol/week: 1.0 standard drink of alcohol     Types: 1 Cans of beer per week     Comment: occasional    Drug use: No    Sexual activity: Not Currently     Partners: Male   Other Topics Concern    None   Social History Narrative    Lives with spouse    Pap smear: She goes to her gyn for pap smear    Annual eye exam: Advise     Social Determinants of Health     Financial Resource Strain: Low Risk  (12/20/2023)    Overall Financial Resource Strain (CARDIA)     Difficulty of Paying Living Expenses: Not very hard   Food Insecurity: Not on file   Transportation Needs: No Transportation Needs (12/20/2023)    PRAPARE - Transportation     Lack of Transportation (Medical): No     Lack of Transportation (Non-Medical): No   Physical Activity: Not on file   Stress: Not on file   Social Connections: Not on file   Intimate Partner Violence: Not on file   Housing Stability: Not on file      Medications and Allergies:     Current Outpatient Medications   Medication Sig Dispense Refill    calcium carbonate (OS-FELICITA) 600 MG tablet Take 600 mg by mouth 2 (two) times a day with meals      cholecalciferol (VITAMIN D3) 1,000 units tablet Take 1,000 Units by mouth daily      glucose blood test strip Use 1 each daily as needed Use as instructed      methylPREDNISolone 4 MG tablet therapy pack Use as directed on package 21 each 0    Multiple Vitamin (MULTI-VITAMIN DAILY) TABS Take by mouth      psyllium (METAMUCIL) 58.6 % packet  Take 1 packet by mouth as needed      senna-docusate sodium (SENOKOT-S) 8.6-50 mg per tablet Take 2 tablets by mouth 2 (two) times a day       No current facility-administered medications for this visit.     No Known Allergies   Immunizations:     Immunization History   Administered Date(s) Administered    COVID-19 PFIZER VACCINE 0.3 ML IM 02/08/2021, 02/28/2021, 10/02/2021    COVID-19 Pfizer vac (Rhett-sucrose, gray cap) 12 yr+ IM 05/25/2022    Influenza, high dose seasonal 0.7 mL 11/01/2021, 10/14/2022, 11/17/2023      Health Maintenance:         Topic Date Due    Hepatitis C Screening  Never done    Breast Cancer Screening: Mammogram  09/01/2024    Colorectal Cancer Screening  12/08/2024         Topic Date Due    Pneumococcal Vaccine: 65+ Years (2 - PPSV23 or PCV20) 10/30/2021    COVID-19 Vaccine (5 - 2023-24 season) 09/01/2023      Medicare Screening Tests and Risk Assessments:     Cris is here for her Subsequent Wellness visit.     Health Risk Assessment:   Patient rates overall health as good. Patient feels that their physical health rating is same. Patient is satisfied with their life. Eyesight was rated as same. Hearing was rated as same. Patient feels that their emotional and mental health rating is same. Patients states they are never, rarely angry. Patient states they are never, rarely unusually tired/fatigued. Pain experienced in the last 7 days has been some. Patient's pain rating has been 2/10. Patient states that she has experienced no weight loss or gain in last 6 months. Right leg pain at night    Depression Screening:   PHQ-2 Score: 0      Fall Risk Screening:   In the past year, patient has experienced: no history of falling in past year      Urinary Incontinence Screening:   Patient has not leaked urine accidently in the last six months.     Home Safety:  Patient does not have trouble with stairs inside or outside of their home. Patient has working smoke alarms and has working carbon monoxide  detector. Home safety hazards include: none.     Nutrition:   Current diet is Limited junk food and Low Cholesterol.     Medications:   Patient is currently taking over-the-counter supplements. OTC medications include: see medication list. Patient is able to manage medications.     Activities of Daily Living (ADLs)/Instrumental Activities of Daily Living (IADLs):   Walk and transfer into and out of bed and chair?: Yes  Dress and groom yourself?: Yes    Bathe or shower yourself?: Yes    Feed yourself? Yes  Do your laundry/housekeeping?: Yes  Manage your money, pay your bills and track your expenses?: Yes  Make your own meals?: Yes    Do your own shopping?: Yes    Previous Hospitalizations:   Any hospitalizations or ED visits within the last 12 months?: No      Advance Care Planning:   Living will: No    Durable POA for healthcare: No    Advanced directive: No    Advanced directive counseling given: Yes    ACP document given: Yes      Cognitive Screening:   Provider or family/friend/caregiver concerned regarding cognition?: No    PREVENTIVE SCREENINGS      Cardiovascular Screening:    General: History Lipid Disorder and Screening Current      Diabetes Screening:     General: Screening Current      Colorectal Cancer Screening:     General: Screening Current      Breast Cancer Screening:     General: Screening Current      Cervical Cancer Screening:    General: Screening Not Indicated      Osteoporosis Screening:    General: Screening Current      Abdominal Aortic Aneurysm (AAA) Screening:        General: Screening Not Indicated      Lung Cancer Screening:     General: Screening Not Indicated    Screening, Brief Intervention, and Referral to Treatment (SBIRT)    Screening  Typical number of drinks in a day: 0  Typical number of drinks in a week: 0  Interpretation: Low risk drinking behavior.    Single Item Drug Screening:  How often have you used an illegal drug (including marijuana) or a prescription medication for  "non-medical reasons in the past year? never    Single Item Drug Screen Score: 0  Interpretation: Negative screen for possible drug use disorder    Brief Intervention  Alcohol & drug use screenings were reviewed. No concerns regarding substance use disorder identified.     Other Counseling Topics:   Car/seat belt/driving safety, skin self-exam, sunscreen and calcium and vitamin D intake and regular weightbearing exercise.     No results found.     Physical Exam:     /80 (BP Location: Left arm, Patient Position: Sitting, Cuff Size: Large)   Pulse 86   Temp 97.5 °F (36.4 °C) (Temporal)   Resp 18   Ht 5' 3\" (1.6 m)   Wt 80.7 kg (178 lb)   SpO2 98%   BMI 31.53 kg/m²     Physical Exam  Vitals and nursing note reviewed.   Constitutional:       General: She is not in acute distress.     Appearance: She is well-developed. She is obese.   HENT:      Head: Normocephalic and atraumatic.      Right Ear: Tympanic membrane, ear canal and external ear normal.      Left Ear: Tympanic membrane, ear canal and external ear normal.      Nose: Nose normal.      Mouth/Throat:      Mouth: Mucous membranes are moist.      Pharynx: Oropharynx is clear.   Eyes:      General: No scleral icterus.        Right eye: No discharge.         Left eye: No discharge.      Extraocular Movements: Extraocular movements intact.      Conjunctiva/sclera: Conjunctivae normal.   Cardiovascular:      Rate and Rhythm: Normal rate and regular rhythm.      Pulses: Normal pulses.      Heart sounds: Normal heart sounds. No murmur heard.  Pulmonary:      Effort: Pulmonary effort is normal. No respiratory distress.      Breath sounds: Normal breath sounds. No wheezing, rhonchi or rales.   Abdominal:      General: Bowel sounds are normal. There is no distension.      Palpations: Abdomen is soft.      Tenderness: There is no abdominal tenderness.   Musculoskeletal:         General: Tenderness (Tender on the right side of the lumbar area.  SLR equivocal on " the right side.  She is ambulating without any problem.) present. No swelling. Normal range of motion.      Cervical back: Normal range of motion and neck supple.      Right lower leg: No edema.      Left lower leg: No edema.   Skin:     General: Skin is warm and dry.      Capillary Refill: Capillary refill takes less than 2 seconds.      Findings: No rash.   Neurological:      General: No focal deficit present.      Mental Status: She is alert and oriented to person, place, and time.      Motor: No weakness.   Psychiatric:         Mood and Affect: Mood normal.         Behavior: Behavior normal.          Nicola Marquez MD

## 2023-12-20 ENCOUNTER — OFFICE VISIT (OUTPATIENT)
Dept: INTERNAL MEDICINE CLINIC | Facility: CLINIC | Age: 73
End: 2023-12-20
Payer: COMMERCIAL

## 2023-12-20 VITALS
RESPIRATION RATE: 18 BRPM | DIASTOLIC BLOOD PRESSURE: 80 MMHG | TEMPERATURE: 97.5 F | WEIGHT: 178 LBS | OXYGEN SATURATION: 98 % | HEART RATE: 86 BPM | SYSTOLIC BLOOD PRESSURE: 120 MMHG | HEIGHT: 63 IN | BODY MASS INDEX: 31.54 KG/M2

## 2023-12-20 DIAGNOSIS — R73.03 PREDIABETES: ICD-10-CM

## 2023-12-20 DIAGNOSIS — U07.1 COVID-19: ICD-10-CM

## 2023-12-20 DIAGNOSIS — M85.89 OSTEOPENIA OF MULTIPLE SITES: ICD-10-CM

## 2023-12-20 DIAGNOSIS — E78.1 HYPERTRIGLYCERIDEMIA: ICD-10-CM

## 2023-12-20 DIAGNOSIS — Z00.00 MEDICARE ANNUAL WELLNESS VISIT, SUBSEQUENT: Primary | ICD-10-CM

## 2023-12-20 DIAGNOSIS — E55.9 VITAMIN D DEFICIENCY: ICD-10-CM

## 2023-12-20 DIAGNOSIS — K59.00 CONSTIPATION, UNSPECIFIED CONSTIPATION TYPE: ICD-10-CM

## 2023-12-20 DIAGNOSIS — G89.29 CHRONIC RIGHT-SIDED LOW BACK PAIN WITH RIGHT-SIDED SCIATICA: ICD-10-CM

## 2023-12-20 DIAGNOSIS — M54.41 CHRONIC RIGHT-SIDED LOW BACK PAIN WITH RIGHT-SIDED SCIATICA: ICD-10-CM

## 2023-12-20 PROCEDURE — G0439 PPPS, SUBSEQ VISIT: HCPCS | Performed by: INTERNAL MEDICINE

## 2023-12-20 PROCEDURE — 99213 OFFICE O/P EST LOW 20 MIN: CPT | Performed by: INTERNAL MEDICINE

## 2023-12-20 RX ORDER — METHYLPREDNISOLONE 4 MG/1
TABLET ORAL
Qty: 21 EACH | Refills: 0 | Status: SHIPPED | OUTPATIENT
Start: 2023-12-20

## 2023-12-20 NOTE — ASSESSMENT & PLAN NOTE
She has a chronic constipation.  She takes Metamucil and Senokot despite at times she is still gets constipation.  Has a colonoscopy more than 10 years ago.  Discussed with the patient to see GI specialist for further evaluation recommendation.  To maintain hydration and fiber.

## 2023-12-20 NOTE — ASSESSMENT & PLAN NOTE
Recently she had a COVID-19 infection was treated with Paxlovid.  Now her symptoms are resolved.  Denies any chest pain, shortness of breath, fever, chills, cough.

## 2023-12-20 NOTE — ASSESSMENT & PLAN NOTE
Last blood sugar 110 and hemoglobin A1c 6.2.  Advised to watch diet for sugar and carbs intake.  Will follow blood sugar and hemoglobin A1c.

## 2023-12-20 NOTE — PATIENT INSTRUCTIONS
Patient was advised to continue present medications. discussed with the patient medications and laboratory data in detail.  Follow-up with me in 3 months or as advised.  If any blood test was ordered please do 1 week prior to next appointment unless advise to get earlier.  If you have any questions please call the office [phone]

## 2023-12-20 NOTE — ASSESSMENT & PLAN NOTE
Patient complain of right-sided low back pain radiating to right leg on and off on certain position.  Most likely radiculopathy.  Patient denies any fall or injury.  Will obtain x-ray of the lumbar spine.  Will start on Medrol Dosepak and physical therapy.  She was advised she may take also acetaminophen as needed for pain.  Discussed with the patient if not better will refer to specialist.

## 2023-12-20 NOTE — ASSESSMENT & PLAN NOTE
Last DEXA scan April 2022 revealed osteopenia.  She is on calcium 600 mg twice daily and also vitamin D supplement 1000 IU daily.  Advised for weightbearing exercise.

## 2023-12-21 ENCOUNTER — APPOINTMENT (OUTPATIENT)
Dept: LAB | Facility: CLINIC | Age: 73
End: 2023-12-21
Payer: COMMERCIAL

## 2023-12-21 DIAGNOSIS — E78.1 HYPERTRIGLYCERIDEMIA: ICD-10-CM

## 2023-12-21 DIAGNOSIS — M85.89 OSTEOPENIA OF MULTIPLE SITES: ICD-10-CM

## 2023-12-21 DIAGNOSIS — E55.9 VITAMIN D DEFICIENCY: ICD-10-CM

## 2023-12-21 DIAGNOSIS — D53.9 DEFICIENCY ANEMIA: Primary | ICD-10-CM

## 2023-12-21 DIAGNOSIS — Z00.00 MEDICARE ANNUAL WELLNESS VISIT, SUBSEQUENT: ICD-10-CM

## 2023-12-21 DIAGNOSIS — R73.03 PREDIABETES: ICD-10-CM

## 2023-12-21 DIAGNOSIS — K59.00 CONSTIPATION, UNSPECIFIED CONSTIPATION TYPE: ICD-10-CM

## 2023-12-21 LAB
ALBUMIN SERPL BCP-MCNC: 4 G/DL (ref 3.5–5)
ALP SERPL-CCNC: 58 U/L (ref 34–104)
ALT SERPL W P-5'-P-CCNC: 12 U/L (ref 7–52)
ANION GAP SERPL CALCULATED.3IONS-SCNC: 6 MMOL/L
AST SERPL W P-5'-P-CCNC: 15 U/L (ref 13–39)
BASOPHILS # BLD AUTO: 0.04 THOUSANDS/ÂΜL (ref 0–0.1)
BASOPHILS NFR BLD AUTO: 1 % (ref 0–1)
BILIRUB SERPL-MCNC: 0.41 MG/DL (ref 0.2–1)
BILIRUB UR QL STRIP: NEGATIVE
BUN SERPL-MCNC: 10 MG/DL (ref 5–25)
CALCIUM SERPL-MCNC: 9.7 MG/DL (ref 8.4–10.2)
CHLORIDE SERPL-SCNC: 102 MMOL/L (ref 96–108)
CHOLEST SERPL-MCNC: 163 MG/DL
CLARITY UR: CLEAR
CO2 SERPL-SCNC: 28 MMOL/L (ref 21–32)
COLOR UR: NORMAL
CREAT SERPL-MCNC: 0.71 MG/DL (ref 0.6–1.3)
EOSINOPHIL # BLD AUTO: 0.04 THOUSAND/ÂΜL (ref 0–0.61)
EOSINOPHIL NFR BLD AUTO: 1 % (ref 0–6)
ERYTHROCYTE [DISTWIDTH] IN BLOOD BY AUTOMATED COUNT: 14.8 % (ref 11.6–15.1)
EST. AVERAGE GLUCOSE BLD GHB EST-MCNC: 151 MG/DL
GFR SERPL CREATININE-BSD FRML MDRD: 84 ML/MIN/1.73SQ M
GLUCOSE P FAST SERPL-MCNC: 117 MG/DL (ref 65–99)
GLUCOSE UR STRIP-MCNC: NEGATIVE MG/DL
HBA1C MFR BLD: 6.9 %
HCT VFR BLD AUTO: 37 % (ref 34.8–46.1)
HDLC SERPL-MCNC: 47 MG/DL
HGB BLD-MCNC: 11.4 G/DL (ref 11.5–15.4)
HGB UR QL STRIP.AUTO: NEGATIVE
IMM GRANULOCYTES # BLD AUTO: 0.01 THOUSAND/UL (ref 0–0.2)
IMM GRANULOCYTES NFR BLD AUTO: 0 % (ref 0–2)
KETONES UR STRIP-MCNC: NEGATIVE MG/DL
LDLC SERPL CALC-MCNC: 99 MG/DL (ref 0–100)
LEUKOCYTE ESTERASE UR QL STRIP: NEGATIVE
LYMPHOCYTES # BLD AUTO: 2.03 THOUSANDS/ÂΜL (ref 0.6–4.47)
LYMPHOCYTES NFR BLD AUTO: 41 % (ref 14–44)
MCH RBC QN AUTO: 26 PG (ref 26.8–34.3)
MCHC RBC AUTO-ENTMCNC: 30.8 G/DL (ref 31.4–37.4)
MCV RBC AUTO: 84 FL (ref 82–98)
MONOCYTES # BLD AUTO: 0.51 THOUSAND/ÂΜL (ref 0.17–1.22)
MONOCYTES NFR BLD AUTO: 10 % (ref 4–12)
NEUTROPHILS # BLD AUTO: 2.3 THOUSANDS/ÂΜL (ref 1.85–7.62)
NEUTS SEG NFR BLD AUTO: 47 % (ref 43–75)
NITRITE UR QL STRIP: NEGATIVE
NONHDLC SERPL-MCNC: 116 MG/DL
NRBC BLD AUTO-RTO: 0 /100 WBCS
PH UR STRIP.AUTO: 7 [PH]
PLATELET # BLD AUTO: 272 THOUSANDS/UL (ref 149–390)
PMV BLD AUTO: 9.5 FL (ref 8.9–12.7)
POTASSIUM SERPL-SCNC: 4 MMOL/L (ref 3.5–5.3)
PROT SERPL-MCNC: 7.2 G/DL (ref 6.4–8.4)
PROT UR STRIP-MCNC: NEGATIVE MG/DL
RBC # BLD AUTO: 4.39 MILLION/UL (ref 3.81–5.12)
SODIUM SERPL-SCNC: 136 MMOL/L (ref 135–147)
SP GR UR STRIP.AUTO: 1.01 (ref 1–1.03)
TRIGL SERPL-MCNC: 87 MG/DL
TSH SERPL DL<=0.05 MIU/L-ACNC: 1.26 UIU/ML (ref 0.45–4.5)
UROBILINOGEN UR STRIP-ACNC: <2 MG/DL
WBC # BLD AUTO: 4.93 THOUSAND/UL (ref 4.31–10.16)

## 2023-12-21 PROCEDURE — 36415 COLL VENOUS BLD VENIPUNCTURE: CPT

## 2023-12-21 PROCEDURE — 80053 COMPREHEN METABOLIC PANEL: CPT

## 2023-12-21 PROCEDURE — 84443 ASSAY THYROID STIM HORMONE: CPT

## 2023-12-21 PROCEDURE — 85025 COMPLETE CBC W/AUTO DIFF WBC: CPT

## 2023-12-21 PROCEDURE — 80061 LIPID PANEL: CPT

## 2023-12-21 PROCEDURE — 81003 URINALYSIS AUTO W/O SCOPE: CPT | Performed by: INTERNAL MEDICINE

## 2023-12-21 PROCEDURE — 83036 HEMOGLOBIN GLYCOSYLATED A1C: CPT

## 2024-01-09 ENCOUNTER — EVALUATION (OUTPATIENT)
Dept: PHYSICAL THERAPY | Facility: CLINIC | Age: 74
End: 2024-01-09
Payer: COMMERCIAL

## 2024-01-09 DIAGNOSIS — G89.29 CHRONIC RIGHT-SIDED LOW BACK PAIN WITH RIGHT-SIDED SCIATICA: ICD-10-CM

## 2024-01-09 DIAGNOSIS — M54.41 CHRONIC RIGHT-SIDED LOW BACK PAIN WITH RIGHT-SIDED SCIATICA: ICD-10-CM

## 2024-01-09 PROCEDURE — 97110 THERAPEUTIC EXERCISES: CPT | Performed by: PHYSICAL THERAPIST

## 2024-01-09 NOTE — PROGRESS NOTES
PT Evaluation     Today's date: 2024  Patient name: Cris Infante  : 1950  MRN: [id number]  Referring provider: Nicola Marquez*  Dx:   Encounter Diagnosis     ICD-10-CM    1. Chronic right-sided low back pain with right-sided sciatica  M54.41 Ambulatory Referral to Physical Therapy    G89.29                      Assessment  Assessment details: Cris Infante is a 73 y.o. female with Chronic right-sided low back pain with right-sided sciatica.   She presents with pain, decreased strength, decreased ROM, and postural  dysfunction. Due to these impairments, Patient has difficulty performing a/iadls, recreational activities and engaging in social activities. Patient's clinical presentation is consistent with their referring diagnosis. Patient would benefit from skilled physical therapy to address their aforementioned impairments, improve their level of function and to improve their overall quality of life.  has been given a home exercise program and is in agreement with the plan of care.  Thank you for your referral.     Impairments: abnormal or restricted ROM, impaired balance, impaired physical strength, lacks appropriate home exercise program and pain with function    Symptom irritability: lowUnderstanding of Dx/Px/POC: excellent  Plan  Patient would benefit from: skilled physical therapy  Referral necessary: No  Planned therapy interventions: neuromuscular re-education, strengthening, stretching and home exercise program  Frequency: 2x week  Duration in visits: 20  Duration in weeks: 20  Plan of Care beginning date: 2024  Plan of Care expiration date: 3/8/2024  Treatment plan discussed with: patient        Subjective Evaluation    History of Present Illness  Mechanism of injury: Patient reports insidious onset of LBP.  She reports that when she got constipated she began having pain on the R side.  If she is sitting she has no pain but when she goes to stand up she has pain which radiates to  "the front of her thigh into the R knee.  She states that when she gets up and walking the pain resolves.  She notes that when she gets up in the morning she \"cannot move\" but then after she starts moving the pain resolves.  Pain does not wake her in the night.  She has pain in the night when she gets up to go to the bathroom.    CC:  R sided LBP which radiates to the R anterior thigh.  She notes that \"sometimes\" it tingles and feels numb.  She notes that the sx resolve quickly.  Function:  She is retired.  She notes that she tries to walk for exercise but with the weather she cannot walk outside.  She is able to climb the stairs without pain.  She did work as a PCA.  She states that she can carry her laundry.  There is mild discomfort when she picks up the basket.          Recurrent probem    Quality of life: excellent    Pain  Current pain ratin  At best pain ratin  At worst pain rating: 10  Location: in the night when she gets up  Quality: sharp  Relieving factors: heat and change in position  Aggravating factors: sitting and standing    Social Support  Steps to enter house: yes  Stairs in house: yes   Lives with: spouse    Employment status: not working    Diagnostic Tests    FCE comments: Ordered but not taken      Objective     Concurrent Complaints  Negative for night pain, disturbed sleep, cardiac problem, kidney problem and history of cancer    Neurological Testing     Sensation     Lumbar   Left   Intact: light touch    Right   Intact: light touch    Reflexes   Left   Patellar (L4): normal (2+)    Right   Patellar (L4): normal (2+)    Active Range of Motion     Lumbar   Flexion:  WFL Restriction level: minimal  Extension: 15 degrees   Left lateral flexion: 15 degrees     Right lateral flexion: 15 degrees   Mechanical Assessment    Cervical      Thoracic      Lumbar    Sitting flexion: repeated movements  Pain intensity: worse  Standing extension: repeated movements  Lying extension: sustained " positions  Pain location: no change    Strength/Myotome Testing     Lumbar   Left   Normal strength    Right Hip   Planes of Motion   Flexion: 5  Abduction: 4  Adduction: 5    Right Knee   Extension: 5    Right Ankle/Foot   Dorsiflexion: 4    Tests     Lumbar     Right   Negative passive SLR and slump test.     Right Pelvic Girdle/Sacrum   Negative: active SLR test.     Right Hip   Negative MILLY.     Functional Assessment        Single Leg Stance - Eyes Open   Left  Trial 1: 3 seconds  Trial 2: 7 seconds    Right  Trial 1: 3 seconds  Trial 2: 7 seconds    General Comments:    Lower quarter screen   Hips: unremarkable  Knees: unremarkable    Ankle/Foot Comments   Decreased DF on the R             Precautions: NA  Access Code: [email or web address]  URL: [email or web address]  Date: 01/09/2024  Prepared by: Es Walters    Exercises  - Standing Lumbar Extension  - 5 x daily - 7 x weekly - 1 sets - 5 reps - 5 hold  - Supine Lower Trunk Rotation  - 2 x daily - 7 x weekly - 1 sets - 5 reps - 20 hold    Manuals 1/9                                                                Neuro Re-Ed             SLS                                                                                           Ther Ex             Standing trunk extension 5x :05            Bike w/u             LTR to the L 5 x :20            Glute St             Wedge st             HR/TR                                       Ther Activity                                       Gait Training                                       Modalities

## 2024-01-11 ENCOUNTER — OFFICE VISIT (OUTPATIENT)
Dept: PHYSICAL THERAPY | Facility: CLINIC | Age: 74
End: 2024-01-11
Payer: COMMERCIAL

## 2024-01-11 DIAGNOSIS — M54.41 CHRONIC RIGHT-SIDED LOW BACK PAIN WITH RIGHT-SIDED SCIATICA: Primary | ICD-10-CM

## 2024-01-11 DIAGNOSIS — G89.29 CHRONIC RIGHT-SIDED LOW BACK PAIN WITH RIGHT-SIDED SCIATICA: Primary | ICD-10-CM

## 2024-01-11 PROCEDURE — 97110 THERAPEUTIC EXERCISES: CPT

## 2024-01-11 NOTE — PROGRESS NOTES
"Daily Note     Today's date: 2024  Patient name: Cris Infante  : 1950  MRN: [id number]  Referring provider: Nicola Marquez*  Dx: No diagnosis found.               Subjective: Patient states that R LBP is minimal today. No complaints of radiating symptoms.       Objective: See treatment diary below      Assessment: Tolerated treatment well. Patient exhibited good technique with therapeutic exercises      Plan: Continue per plan of care.      Precautions: NA  Access Code: [email or web address]  URL: [email or web address]  Date: 2024  Prepared by: Es Walters    Exercises  - Standing Lumbar Extension  - 5 x daily - 7 x weekly - 1 sets - 5 reps - 5 hold  - Supine Lower Trunk Rotation  - 2 x daily - 7 x weekly - 1 sets - 5 reps - 20 hold    Manuals                                                                Neuro Re-Ed             SLS  10\" x 10                                                                                         Ther Ex             Standing trunk extension 5x :05 5\" x 5            Bike   6 min            LTR to the L 5 x :20 5 x 20\"            Glute St  5 x 20\"            Wedge st  20\" x 5 R           HR/TR  2 x 10                                      Ther Activity                                       Gait Training                                       Modalities                          MH                  "

## 2024-01-15 ENCOUNTER — OFFICE VISIT (OUTPATIENT)
Dept: PHYSICAL THERAPY | Facility: CLINIC | Age: 74
End: 2024-01-15
Payer: COMMERCIAL

## 2024-01-15 DIAGNOSIS — G89.29 CHRONIC RIGHT-SIDED LOW BACK PAIN WITH RIGHT-SIDED SCIATICA: Primary | ICD-10-CM

## 2024-01-15 DIAGNOSIS — M54.41 CHRONIC RIGHT-SIDED LOW BACK PAIN WITH RIGHT-SIDED SCIATICA: Primary | ICD-10-CM

## 2024-01-15 PROCEDURE — 97110 THERAPEUTIC EXERCISES: CPT

## 2024-01-15 NOTE — PROGRESS NOTES
"Daily Note     Today's date: 1/15/2024  Patient name: Cris Infante  : 1950  MRN: [id number]  Referring provider: Nicola Marquez*  Dx: No diagnosis found.               Subjective: Patient denies pain following last treatment session. States that her pain is primarily at night time. Pain radiates into R glute.      Objective: See treatment diary below      Assessment: Tolerated treatment well. Patient exhibited good technique with therapeutic exercises      Plan: Continue per plan of care.      Precautions: NA  Access Code: [email or web address]  URL: [email or web address]  Date: 2024  Prepared by: Es Walters    Exercises  - Standing Lumbar Extension  - 5 x daily - 7 x weekly - 1 sets - 5 reps - 5 hold  - Supine Lower Trunk Rotation  - 2 x daily - 7 x weekly - 1 sets - 5 reps - 20 hold    Manuals 1/9 1/11 1/15                                                              Neuro Re-Ed             SLS  10\" x 10 10\" x 10                                                                                         Ther Ex             Standing trunk extension 5x :05 5\" x 5  5\" x 5           Bike   6 min  6 min           LTR to the L 5 x :20 5 x 20\"  5\"x 10           Glute St  5 x 20\"  5 x 20\"           Wedge st  20\" x 5 R 20\" x 5           HR/TR  2 x 10  2 x 10           Seated n. glide   1\" x 10           Supine hip abd/add isometrics   5\" x 15           Seated lumbar extension   2 x 10           Ther Activity                                       Gait Training                                       Modalities                          MH                    "

## 2024-01-17 ENCOUNTER — OFFICE VISIT (OUTPATIENT)
Dept: PHYSICAL THERAPY | Facility: CLINIC | Age: 74
End: 2024-01-17
Payer: COMMERCIAL

## 2024-01-17 DIAGNOSIS — G89.29 CHRONIC RIGHT-SIDED LOW BACK PAIN WITH RIGHT-SIDED SCIATICA: Primary | ICD-10-CM

## 2024-01-17 DIAGNOSIS — M54.41 CHRONIC RIGHT-SIDED LOW BACK PAIN WITH RIGHT-SIDED SCIATICA: Primary | ICD-10-CM

## 2024-01-17 PROCEDURE — 97110 THERAPEUTIC EXERCISES: CPT

## 2024-01-17 NOTE — PROGRESS NOTES
"Daily Note     Today's date: 2024  Patient name: Cris Infante  : 1950  MRN: [id number]  Referring provider: Nicola Marquez*  Dx: No diagnosis found.               Subjective: No change in symptoms at night.       Objective: See treatment diary below      Assessment: Tolerated treatment well. Patient exhibited good technique with therapeutic exercises      Plan: Continue per plan of care.      Precautions: NA  Access Code: [email or web address]  URL: [email or web address]  Date: 2024  Prepared by: Es Walters    Exercises  - Standing Lumbar Extension  - 5 x daily - 7 x weekly - 1 sets - 5 reps - 5 hold  - Supine Lower Trunk Rotation  - 2 x daily - 7 x weekly - 1 sets - 5 reps - 20 hold    Manuals 1/9 1/11 1/15 1/17                                                             Neuro Re-Ed             SLS  10\" x 10 10\" x 10  10\" x 10                                                                                        Ther Ex             Standing trunk extension 5x :05 5\" x 5  5\" x 5  5\" x 10          Bike   6 min  6 min  6 min          LTR to the L 5 x :20 5 x 20\"  5\"x 10  5\" x 10          Glute St  5 x 20\"  5 x 20\"  SKTC         Wedge st  20\" x 5 R 20\" x 5  supine         HR/TR  2 x 10  2 x 10  2 x 10          Seated n. glide   1\" x 10  1\" x 10          Supine hip abd/add isometrics   5\" x 15  5\" x 10          Seated lumbar extension   2 x 10  10\" x 2 x 10          SKTC    10\"x 10          Supine gastroc stretch    10\" x 5                      Ther Activity                                       Gait Training                                       Modalities                          MH                      "

## 2024-01-23 ENCOUNTER — OFFICE VISIT (OUTPATIENT)
Dept: PHYSICAL THERAPY | Facility: CLINIC | Age: 74
End: 2024-01-23
Payer: COMMERCIAL

## 2024-01-23 DIAGNOSIS — G89.29 CHRONIC RIGHT-SIDED LOW BACK PAIN WITH RIGHT-SIDED SCIATICA: Primary | ICD-10-CM

## 2024-01-23 DIAGNOSIS — M54.41 CHRONIC RIGHT-SIDED LOW BACK PAIN WITH RIGHT-SIDED SCIATICA: Primary | ICD-10-CM

## 2024-01-23 PROCEDURE — 97110 THERAPEUTIC EXERCISES: CPT

## 2024-01-23 NOTE — PROGRESS NOTES
"Daily Note     Today's date: 2024  Patient name: Cris Infante  : 1950  MRN: [id number]  Referring provider: Nicola Marquez*  Dx: No diagnosis found.               Subjective: Patient denies pain during the day. Her largest complaint is pain night.       Objective: See treatment diary below      Assessment: Tolerated treatment well. Patient exhibited good technique with therapeutic exercises      Plan: Continue per plan of care.      Precautions: NA  Access Code: [email or web address]  URL: [email or web address]  Date: 2024  Prepared by: Es Walters    Exercises  - Standing Lumbar Extension  - 5 x daily - 7 x weekly - 1 sets - 5 reps - 5 hold  - Supine Lower Trunk Rotation  - 2 x daily - 7 x weekly - 1 sets - 5 reps - 20 hold    Manuals 1/9 1/11 1/15 1/17 1/23                                                            Neuro Re-Ed             SLS  10\" x 10 10\" x 10  10\" x 10  10\" x 10                                                                                      Ther Ex             Standing trunk extension 5x :05 5\" x 5  5\" x 5  5\" x 10          Bike   6 min  6 min  6 min  6 min         LTR to the L 5 x :20 5 x 20\"  5\"x 10  5\" x 10  10\" x 5        Glute St  5 x 20\"  5 x 20\"  SKTC         Wedge st  20\" x 5 R 20\" x 5  supine DC        HR/TR  2 x 10  2 x 10  2 x 10  2 x 10         Seated n. glide   1\" x 10  1\" x 10  1\" x 10         Supine hip abd/add isometrics   5\" x 15  5\" x 10  5\" x 10         Seated lumbar extension   2 x 10  10\" x 2 x 10          SKTC    10\"x 10  10\" x 10         Supine gastroc stretch    10\" x 5 20\" x 5                      Ther Activity                                       Gait Training                                       Modalities                          MH                        "

## 2024-01-25 ENCOUNTER — EVALUATION (OUTPATIENT)
Dept: PHYSICAL THERAPY | Facility: CLINIC | Age: 74
End: 2024-01-25
Payer: COMMERCIAL

## 2024-01-25 DIAGNOSIS — M54.41 CHRONIC RIGHT-SIDED LOW BACK PAIN WITH RIGHT-SIDED SCIATICA: Primary | ICD-10-CM

## 2024-01-25 DIAGNOSIS — G89.29 CHRONIC RIGHT-SIDED LOW BACK PAIN WITH RIGHT-SIDED SCIATICA: Primary | ICD-10-CM

## 2024-01-25 PROCEDURE — 97140 MANUAL THERAPY 1/> REGIONS: CPT | Performed by: PHYSICAL THERAPIST

## 2024-01-25 PROCEDURE — 97110 THERAPEUTIC EXERCISES: CPT | Performed by: PHYSICAL THERAPIST

## 2024-01-25 NOTE — PROGRESS NOTES
"Daily Note     Today's date: 2024  Patient name: Cris Infante  : 1950  MRN: [id number]  Referring provider: Nicola Marquez*  Dx:   Encounter Diagnosis     ICD-10-CM    1. Chronic right-sided low back pain with right-sided sciatica  M54.41     G89.29                      Subjective: Patient notes no change in her nighttime pain.       Objective: See treatment diary below      Assessment: Tolerated treatment well. Patient would benefit from continued PTPatient ttp at the L 234 R Sps and TPs      Plan: Continue per plan of care.      Precautions: NA  Access Code: [email or web address]  URL: [email or web address]  Date: 2024  Prepared by: Es Walters    Exercises  - Standing Lumbar Extension  - 5 x daily - 7 x weekly - 1 sets - 5 reps - 5 hold  - Supine Lower Trunk Rotation  - 2 x daily - 7 x weekly - 1 sets - 5 reps - 20 hold    Manuals 1/9 1/11 1/15 1/17 1/23 1/25       Jms L2-4 R      10'                                              Neuro Re-Ed             SLS  10\" x 10 10\" x 10  10\" x 10  10\" x 10 np                                              Ball roll forward      10 x :15                                 Ther Ex             Standing trunk extension 5x :05 5\" x 5  5\" x 5  5\" x 10   dc       Bike   6 min  6 min  6 min  6 min  6'       LTR to the L 5 x :20 5 x 20\"  5\"x 10  5\" x 10  10\" x 5 15\" x 5       Glute St  5 x 20\"  5 x 20\"  SKTC  np       Wedge st  20\" x 5 R 20\" x 5  supine DC        HR/TR  2 x 10  2 x 10  2 x 10  2 x 10  2x10       Seated n. glide   1\" x 10  1\" x 10  1\" x 10  1\" x 10       Supine hip abd/add isometrics   5\" x 15  5\" x 10  5\" x 10  10 x :05       Seated lumbar extension   2 x 10  10\" x 2 x 10   05 x 5       SKTC    10\"x 10  10\" x 10         Supine gastroc stretch    10\" x 5 20\" x 5                      Ther Activity                                       Gait Training                                       Modalities                          MH           "

## 2024-01-29 ENCOUNTER — CONSULT (OUTPATIENT)
Dept: GASTROENTEROLOGY | Facility: CLINIC | Age: 74
End: 2024-01-29
Payer: COMMERCIAL

## 2024-01-29 VITALS
DIASTOLIC BLOOD PRESSURE: 86 MMHG | HEIGHT: 63 IN | WEIGHT: 182.4 LBS | HEART RATE: 72 BPM | SYSTOLIC BLOOD PRESSURE: 136 MMHG | BODY MASS INDEX: 32.32 KG/M2

## 2024-01-29 DIAGNOSIS — K59.00 CONSTIPATION, UNSPECIFIED CONSTIPATION TYPE: ICD-10-CM

## 2024-01-29 DIAGNOSIS — Z12.11 COLON CANCER SCREENING: Primary | ICD-10-CM

## 2024-01-29 PROCEDURE — 99213 OFFICE O/P EST LOW 20 MIN: CPT | Performed by: INTERNAL MEDICINE

## 2024-01-29 PROCEDURE — 1160F RVW MEDS BY RX/DR IN RCRD: CPT | Performed by: INTERNAL MEDICINE

## 2024-01-29 PROCEDURE — 1159F MED LIST DOCD IN RCRD: CPT | Performed by: INTERNAL MEDICINE

## 2024-01-29 NOTE — PROGRESS NOTES
[facility] - Outpatient Follow-up Note  Cris Infante 73 y.o. female MRN: [id number]  Encounter: [id number]          ASSESSMENT AND PLAN:      1. Constipation, unspecified constipation type  She reports her current regimen is working fairly well for her.  I recommended that she titrate up her dose of Metamucil with the hopes of backing off on her dependence on the Senokot.  If this is not helpful or not tolerated, she can use over-the-counter MiraLAX either together with or in place of Metamucil.  Will follow-up here in a few months for reevaluation.    2. Colon cancer screening  Has undergone several prior colonoscopies with no polyps, as well as negative Cologuard within the past few years.  No clear indication for screening colonoscopy given her age and negative findings     ______________________________________________________________________    SUBJECTIVE: Patient returns in follow-up of chronic constipation.  This has been ongoing for a number of years.  She currently is using a regimen of senna twice a day as well as Metamucil daily with fairly good results.  No blood in her stool, no abdominal pain, no fever or chills, jaundice or rash, unexplained weight loss.  Has a history of prior colonoscopy, last approximately 10 years ago with report of negative Cologuard 2 years ago.  No history of prior polyps.  No recent changes in bowel habit.      REVIEW OF SYSTEMS:    ROS       Historical Information   Past Medical History:   Diagnosis Date    Abrasion of knee, bilateral 08/31/2022    Allergic rhinitis 12/02/2021    Bilateral hearing loss 04/29/2022    Bilateral impacted cerumen 04/29/2022    BMI 31.0-31.9,adult 12/20/2023    BMI 32.0-32.9,adult 04/29/2022    BMI 33.0-33.9,adult 12/02/2021    Cataract, bilateral     Chronic bilateral low back pain without sciatica 12/08/2022    Chronic right-sided low back pain with right-sided sciatica 12/20/2023    Constipation     Constipation  2021    COVID-19 2023    Elevated glucose level     Fatigue     History of anemia     Hypertriglyceridemia     Hypertriglyceridemia 2021    Medicare annual wellness visit, subsequent 2021    Medicare annual wellness visit, subsequent 2022    Medicare annual wellness visit, subsequent 2023    Obesity     Osteopenia     Osteopenia of multiple sites 2022    Pain in both knees 2022    Pain of left heel 2021    Post-menopausal     Prediabetes 2023    Rash     Vitamin D deficiency     Vitamin D deficiency 2021     Past Surgical History:   Procedure Laterality Date    CATARACT EXTRACTION, BILATERAL  2018     SECTION      COLONOSCOPY  2014    Advise f/u colonoscopy; see by [facility] 2020 at office; she is supposed to have a colonoscopy, but due to Covid 19, she wants to hold for now     DXA PROCEDURE (HISTORICAL)  2020    MAMMO (HISTORICAL)  2020     Social History   Social History     Substance and Sexual Activity   Alcohol Use Yes    Alcohol/week: 1.0 standard drink of alcohol    Types: 1 Cans of beer per week    Comment: occasional     Social History     Substance and Sexual Activity   Drug Use No     Social History     Tobacco Use   Smoking Status Never   Smokeless Tobacco Never     Family History   Problem Relation Age of Onset    Diabetes Mother     Other Mother         Memory impairment, at old age    No Known Problems Father     Diabetes Sister     Diabetes Sister     No Known Problems Sister     No Known Problems Sister     No Known Problems Daughter     No Known Problems Daughter     No Known Problems Daughter     No Known Problems Daughter     No Known Problems Daughter     No Known Problems Maternal Grandmother     No Known Problems Maternal Grandfather     No Known Problems Paternal Grandmother     No Known Problems Paternal Grandfather     Diabetes Brother     Diabetes Brother     No Known Problems Brother     No  "Known Problems Brother     No Known Problems Son     No Known Problems Maternal Aunt     No Known Problems Paternal Aunt        Meds/Allergies       Current Outpatient Medications:     calcium carbonate (OS-FELICITA) 600 MG tablet    cholecalciferol (VITAMIN D3) 1,000 units tablet    glucose blood test strip    Multiple Vitamin (MULTI-VITAMIN DAILY) TABS    psyllium (METAMUCIL) 58.6 % packet    senna-docusate sodium (SENOKOT-S) 8.6-50 mg per tablet    methylPREDNISolone 4 MG tablet therapy pack    No Known Allergies        Objective     Blood pressure 136/86, pulse 72, height 5' 3\" (1.6 m), weight 82.7 kg (182 lb 6.4 oz). Body mass index is 32.31 kg/m².      PHYSICAL EXAM:      Physical Exam  Vitals and nursing note reviewed.   Constitutional:       General: She is not in acute distress.     Appearance: She is obese. She is not ill-appearing.   HENT:      Head: Normocephalic and atraumatic.   Eyes:      General: No scleral icterus.     Extraocular Movements: Extraocular movements intact.   Cardiovascular:      Rate and Rhythm: Normal rate and regular rhythm.   Pulmonary:      Effort: Pulmonary effort is normal. No respiratory distress.   Abdominal:      General: There is no distension.      Palpations: Abdomen is soft.      Tenderness: There is no abdominal tenderness. There is no guarding or rebound.   Musculoskeletal:      Right lower leg: No edema.      Left lower leg: No edema.   Skin:     General: Skin is warm and dry.      Coloration: Skin is not cyanotic.      Findings: No erythema.   Neurological:      General: No focal deficit present.      Mental Status: She is alert and oriented to person, place, and time.   Psychiatric:         Mood and Affect: Mood normal.         Behavior: Behavior normal.          Lab Results:   No visits with results within 1 Day(s) from this visit.   Latest known visit with results is:   Appointment on 12/21/2023   Component Date Value    WBC 12/21/2023 4.93     RBC 12/21/2023 4.39     " Hemoglobin 12/21/2023 11.4 (L)     Hematocrit 12/21/2023 37.0     MCV 12/21/2023 84     MCH 12/21/2023 26.0 (L)     MCHC 12/21/2023 30.8 (L)     RDW 12/21/2023 14.8     MPV 12/21/2023 9.5     Platelets 12/21/2023 272     nRBC 12/21/2023 0     Neutrophils Relative 12/21/2023 47     Immat GRANS % 12/21/2023 0     Lymphocytes Relative 12/21/2023 41     Monocytes Relative 12/21/2023 10     Eosinophils Relative 12/21/2023 1     Basophils Relative 12/21/2023 1     Neutrophils Absolute 12/21/2023 2.30     Immature Grans Absolute 12/21/2023 0.01     Lymphocytes Absolute 12/21/2023 2.03     Monocytes Absolute 12/21/2023 0.51     Eosinophils Absolute 12/21/2023 0.04     Basophils Absolute 12/21/2023 0.04     Sodium 12/21/2023 136     Potassium 12/21/2023 4.0     Chloride 12/21/2023 102     CO2 12/21/2023 28     ANION GAP 12/21/2023 6     BUN 12/21/2023 10     Creatinine 12/21/2023 0.71     Glucose, Fasting 12/21/2023 117 (H)     Calcium 12/21/2023 9.7     AST 12/21/2023 15     ALT 12/21/2023 12     Alkaline Phosphatase 12/21/2023 58     Total Protein 12/21/2023 7.2     Albumin 12/21/2023 4.0     Total Bilirubin 12/21/2023 0.41     eGFR 12/21/2023 84     Cholesterol 12/21/2023 163     Triglycerides 12/21/2023 87     HDL, Direct 12/21/2023 47 (L)     LDL Calculated 12/21/2023 99     Non-HDL-Chol (CHOL-HDL) 12/21/2023 116     TSH 3RD GENERATON 12/21/2023 1.257     Hemoglobin A1C 12/21/2023 6.9 (H)     EAG 12/21/2023 151          Radiology Results:   No results found.

## 2024-01-30 ENCOUNTER — OFFICE VISIT (OUTPATIENT)
Dept: PHYSICAL THERAPY | Facility: CLINIC | Age: 74
End: 2024-01-30
Payer: COMMERCIAL

## 2024-01-30 DIAGNOSIS — M54.41 CHRONIC RIGHT-SIDED LOW BACK PAIN WITH RIGHT-SIDED SCIATICA: Primary | ICD-10-CM

## 2024-01-30 DIAGNOSIS — G89.29 CHRONIC RIGHT-SIDED LOW BACK PAIN WITH RIGHT-SIDED SCIATICA: Primary | ICD-10-CM

## 2024-01-30 PROCEDURE — 97110 THERAPEUTIC EXERCISES: CPT

## 2024-01-30 PROCEDURE — 97140 MANUAL THERAPY 1/> REGIONS: CPT | Performed by: PHYSICAL THERAPIST

## 2024-01-30 NOTE — PROGRESS NOTES
"Daily Note     Today's date: 2024  Patient name: Cris Infante  : 1950  MRN: [id number]  Referring provider: Nicola Marquez*  Dx: No diagnosis found.               Subjective: No change in symptoms at night.      Objective: See treatment diary below      Assessment: Tolerated treatment well. Patient  tender during MT Jm's       Plan: Continue per plan of care.      Precautions: NA  Access Code: [email or web address]  URL: [email or web address]  Date: 2024  Prepared by: Es Walters    Exercises  - Standing Lumbar Extension  - 5 x daily - 7 x weekly - 1 sets - 5 reps - 5 hold  - Supine Lower Trunk Rotation  - 2 x daily - 7 x weekly - 1 sets - 5 reps - 20 hold    Manuals 1/9 1/11 1/15 1/17 1/23 1/25 1/30      Jms L2-3 R transverse      10' 10                                              Neuro Re-Ed             SLS  10\" x 10 10\" x 10  10\" x 10  10\" x 10 np 10\" x 10                                              Ball roll forward      10 x :15                                 Ther Ex             Standing trunk extension 5x :05 5\" x 5  5\" x 5  5\" x 10   dc DC      Bike   6 min  6 min  6 min  6 min  6' 6      LTR to the L 5 x :20 5 x 20\"  5\"x 10  5\" x 10  10\" x 5 15\" x 5 15\" x 5       Glute St  5 x 20\"  5 x 20\"  SKTC  np       Wedge st  20\" x 5 R 20\" x 5  supine DC        HR/TR  2 x 10  2 x 10  2 x 10  2 x 10  2x10 2 x 10       Seated n. glide   1\" x 10  1\" x 10  1\" x 10  1\" x 10       Supine hip abd/add isometrics   5\" x 15  5\" x 10  5\" x 10  10 x :05 5\" 2 x 10      Seated lumbar extension   2 x 10  10\" x 2 x 10   05 x 5 5\" x 10       SKTC    10\"x 10  10\" x 10         Supine gastroc stretch    10\" x 5 20\" x 5                      Ther Activity                                       Gait Training                                       Modalities                          MH                            "

## 2024-02-01 ENCOUNTER — EVALUATION (OUTPATIENT)
Dept: PHYSICAL THERAPY | Facility: CLINIC | Age: 74
End: 2024-02-01
Payer: COMMERCIAL

## 2024-02-01 DIAGNOSIS — G89.29 CHRONIC RIGHT-SIDED LOW BACK PAIN WITH RIGHT-SIDED SCIATICA: Primary | ICD-10-CM

## 2024-02-01 DIAGNOSIS — M54.41 CHRONIC RIGHT-SIDED LOW BACK PAIN WITH RIGHT-SIDED SCIATICA: Primary | ICD-10-CM

## 2024-02-01 PROCEDURE — 97140 MANUAL THERAPY 1/> REGIONS: CPT | Performed by: PHYSICAL THERAPIST

## 2024-02-01 PROCEDURE — 97110 THERAPEUTIC EXERCISES: CPT | Performed by: PHYSICAL THERAPIST

## 2024-02-01 NOTE — PROGRESS NOTES
"Daily Note     Today's date: 2024  Patient name: Cris Infante  : 1950  MRN: [id number]  Referring provider: Nicola Marquez*  Dx:   Encounter Diagnosis     ICD-10-CM    1. Chronic right-sided low back pain with right-sided sciatica  M54.41     G89.29                      Subjective: Patient reports that she feels less pain in the PM      Objective: See treatment diary below      Assessment: Tolerated treatment well. Patient would benefit from continued PT  Patient challenged by bridging due to weak Les and glutes      Plan: Continue per plan of care.      Precautions: NA  Access Code: [email or web address]  URL: [email or web address]  Date: 2024  Prepared by: Es Walters    Exercises  - Standing Lumbar Extension  - 5 x daily - 7 x weekly - 1 sets - 5 reps - 5 hold  - Supine Lower Trunk Rotation  - 2 x daily - 7 x weekly - 1 sets - 5 reps - 20 hold    Manuals 1/9 1/11 1/15 1/17 1/23 1/25 1/30 2/1     Jms L2-3 R transverse      10' 10  10                                            Neuro Re-Ed             SLS  10\" x 10 10\" x 10  10\" x 10  10\" x 10 np 10\" x 10  10 x :10                                            Ball roll forward/to L      10 x :15  20 x :15                               Ther Ex             Standing trunk extension 5x :05 5\" x 5  5\" x 5  5\" x 10   dc DC      Bike   6 min  6 min  6 min  6 min  6' 6 6'     LTR to the L 5 x :20 5 x 20\"  5\"x 10  5\" x 10  10\" x 5 15\" x 5 15\" x 5  5 x ;15     Glute St  5 x 20\"  5 x 20\"  SKTC  np       Wedge st  20\" x 5 R 20\" x 5  supine DC        HR/TR  2 x 10  2 x 10  2 x 10  2 x 10  2x10 2 x 10  dc     Seated n. glide   1\" x 10  1\" x 10  1\" x 10  1\" x 10       Supine hip abd/add isometrics   5\" x 15  5\" x 10  5\" x 10  10 x :05 5\" 2 x 10 5\" 2 x 10     Seated lumbar extension   2 x 10  10\" x 2 x 10   05 x 5 5\" x 10  10 x :05     SKTC    10\"x 10  10\" x 10             10\" x 5 20\" x 5         Bridges         5 x :05     Ther Activity                 "                       Gait Training                                       Modalities                          MH

## 2024-02-06 ENCOUNTER — OFFICE VISIT (OUTPATIENT)
Dept: PHYSICAL THERAPY | Facility: CLINIC | Age: 74
End: 2024-02-06
Payer: COMMERCIAL

## 2024-02-06 DIAGNOSIS — G89.29 CHRONIC RIGHT-SIDED LOW BACK PAIN WITH RIGHT-SIDED SCIATICA: Primary | ICD-10-CM

## 2024-02-06 DIAGNOSIS — M54.41 CHRONIC RIGHT-SIDED LOW BACK PAIN WITH RIGHT-SIDED SCIATICA: Primary | ICD-10-CM

## 2024-02-06 PROCEDURE — 97112 NEUROMUSCULAR REEDUCATION: CPT

## 2024-02-06 PROCEDURE — 97110 THERAPEUTIC EXERCISES: CPT

## 2024-02-06 NOTE — PROGRESS NOTES
"Daily Note     Today's date: 2024  Patient name: Cris Infante  : 1950  MRN: [id number]  Referring provider: Nicola Marquez*  Dx: No diagnosis found.               Subjective: Patient states that she has noticed some improvements at night with pain levels.       Objective: See treatment diary below      Assessment: Tolerated treatment well. Patient  challenged with bridges. Less tender during MT      Plan: Continue per plan of care.      Precautions: NA  Access Code: [email or web address]  URL: [email or web address]  Date: 2024  Prepared by: Es Walters    Exercises  - Standing Lumbar Extension  - 5 x daily - 7 x weekly - 1 sets - 5 reps - 5 hold  - Supine Lower Trunk Rotation  - 2 x daily - 7 x weekly - 1 sets - 5 reps - 20 hold    Manuals 1/9 1/11 1/15 1/17 1/23 1/25 1/30 2/1 2/6    Jms L2-3 R transverse      10' 10  10 MW                                           Neuro Re-Ed             SLS  10\" x 10 10\" x 10  10\" x 10  10\" x 10 np 10\" x 10  10 x :10 10\" x 10                                            Ball roll forward/to L      10 x :15  20 x :15 20 x 10\"                                Ther Ex             Standing trunk extension 5x :05 5\" x 5  5\" x 5  5\" x 10   dc DC      Bike   6 min  6 min  6 min  6 min  6' 6 6' 5    LTR to the L 5 x :20 5 x 20\"  5\"x 10  5\" x 10  10\" x 5 15\" x 5 15\" x 5  5 x ;15 15\"  x  5    Glute St  5 x 20\"  5 x 20\"  SKTC  np       Wedge st  20\" x 5 R 20\" x 5  supine DC        HR/TR  2 x 10  2 x 10  2 x 10  2 x 10  2x10 2 x 10  dc     Seated n. glide   1\" x 10  1\" x 10  1\" x 10  1\" x 10       Supine hip abd/add isometrics   5\" x 15  5\" x 10  5\" x 10  10 x :05 5\" 2 x 10 5\" 2 x 10 5\" 2 x 10     Seated lumbar extension   2 x 10  10\" x 2 x 10   05 x 5 5\" x 10  10 x :05 5\" x 10     SKTC    10\"x 10  10\" x 10             10\" x 5 20\" x 5         Bridges         5 x :05 5\" x 5     Ther Activity                                       Gait Training                        "                Modalities                          MH

## 2024-02-08 ENCOUNTER — APPOINTMENT (OUTPATIENT)
Dept: PHYSICAL THERAPY | Facility: CLINIC | Age: 74
End: 2024-02-08
Payer: COMMERCIAL

## 2024-02-08 ENCOUNTER — OFFICE VISIT (OUTPATIENT)
Dept: PHYSICAL THERAPY | Facility: CLINIC | Age: 74
End: 2024-02-08
Payer: COMMERCIAL

## 2024-02-08 DIAGNOSIS — M54.41 CHRONIC RIGHT-SIDED LOW BACK PAIN WITH RIGHT-SIDED SCIATICA: Primary | ICD-10-CM

## 2024-02-08 DIAGNOSIS — G89.29 CHRONIC RIGHT-SIDED LOW BACK PAIN WITH RIGHT-SIDED SCIATICA: Primary | ICD-10-CM

## 2024-02-08 PROCEDURE — 97110 THERAPEUTIC EXERCISES: CPT | Performed by: PHYSICAL THERAPIST

## 2024-02-08 PROCEDURE — 97140 MANUAL THERAPY 1/> REGIONS: CPT | Performed by: PHYSICAL THERAPIST

## 2024-02-08 NOTE — PROGRESS NOTES
"Daily Note     Today's date: 2024  Patient name: Cris Infante  : 1950  MRN: [id number]  Referring provider: Nicola Marquez*  Dx:   Encounter Diagnosis     ICD-10-CM    1. Chronic right-sided low back pain with right-sided sciatica  M54.41     G89.29                      Subjective: Patient reports that she has had a little decrease in pain.  She notes pain in the pm continues to be the worst.       Objective: See treatment diary below      Assessment: Tolerated treatment well. Patient would benefit from continued PT      Plan: Continue per plan of care.      Precautions: NA  Access Code: [email or web address]  URL: [email or web address]  Date: 2024  Prepared by: Es Walters    Exercises  - Standing Lumbar Extension  - 5 x daily - 7 x weekly - 1 sets - 5 reps - 5 hold  - Supine Lower Trunk Rotation  - 2 x daily - 7 x weekly - 1 sets - 5 reps - 20 hold    Manuals 1/9 1/11 1/15 1/17 1/23 1/25 1/30 2/1 2/6 2/8   Jms L2-3 R transverse      10' 10  10 MW 8   REV          10'                             Neuro Re-Ed             SLS  10\" x 10 10\" x 10  10\" x 10  10\" x 10 np 10\" x 10  10 x :10 10\" x 10                                            Ball roll forward/to L      10 x :15  20 x :15 20 x 10\"   20 x :10                             Ther Ex             Standing trunk extension 5x :05 5\" x 5  5\" x 5  5\" x 10   dc DC      Bike   6 min  6 min  6 min  6 min  6' 6 6' 5 6'   LTR to the L 5 x :20 5 x 20\"  5\"x 10  5\" x 10  10\" x 5 15\" x 5 15\" x 5  5 x ;15 15\"  x  5 5 x :20   Glute St  5 x 20\"  5 x 20\"  SKTC  np       Wedge st  20\" x 5 R 20\" x 5  supine DC        HR/TR  2 x 10  2 x 10  2 x 10  2 x 10  2x10 2 x 10  dc     Seated n. glide   1\" x 10  1\" x 10  1\" x 10  1\" x 10       Supine hip abd/add isometrics   5\" x 15  5\" x 10  5\" x 10  10 x :05 5\" 2 x 10 5\" 2 x 10 5\" 2 x 10  2 x 10 :05   Seated lumbar extension   2 x 10  10\" x 2 x 10   05 x 5 5\" x 10  10 x :05 5\" x 10  10 x :05   SKTC    10\"x 10  " "10\" x 10         Aravind -R    10\" x 5 20\" x 5      X10   Bridges         5 x :05 5\" x 5  5 x :10   Ther Activity                                       Gait Training                                       Modalities                          MH                                  "

## 2024-02-08 NOTE — PROGRESS NOTES
"PT Re-evaluation     Today's date: 2024  Patient name: Cris Infante  : 1950  MRN: [id number]  Referring provider: Nicola Marquez*  Dx:   Encounter Diagnosis     ICD-10-CM    1. Chronic right-sided low back pain with right-sided sciatica  M54.41 Ambulatory Referral to Physical Therapy    G89.29                      Assessment:    Cris has been attending physical therapy due to LBP.  She has made some improvement in pain complaint particularly during the day.  She does continue to have discomfort in the pm.  Patient is challenged by performing a \"bridge\" due to weakness in the hips and core.  She would continue to benefit from skilled physical therapy to achieve maximal core strength and abolish pain.  She is in agreement with the plan of care.        Assessment details: Cris Infante is a 73 y.o. female with Chronic right-sided low back pain with right-sided sciatica.   She presents with pain, decreased strength, decreased ROM, and postural  dysfunction. Due to these impairments, Patient has difficulty performing a/iadls, recreational activities and engaging in social activities. Patient's clinical presentation is consistent with their referring diagnosis. Patient would benefit from skilled physical therapy to address their aforementioned impairments, improve their level of function and to improve their overall quality of life.  has been given a home exercise program and is in agreement with the plan of care.  Thank you for your referral.     Impairments: abnormal or restricted ROM, impaired balance, impaired physical strength, lacks appropriate home exercise program and pain with function    Symptom irritability: lowUnderstanding of Dx/Px/POC: excellent  Plan  Patient would benefit from: skilled physical therapy  Referral necessary: No  Planned therapy interventions: neuromuscular re-education, strengthening, stretching and home exercise program  Frequency: 2x week  Duration in visits: " "20  Duration in weeks: 20  Plan of Care beginning date: 2024  Plan of Care expiration date: 3/8/2024  Treatment plan discussed with: patient        Subjective Evaluation    History of Present Illness  Mechanism of injury: Patient reports insidious onset of LBP.  She reports that when she got constipated she began having pain on the R side.  If she is sitting she has no pain but when she goes to stand up she has pain which radiates to the front of her thigh into the R knee.  She states that when she gets up and walking the pain resolves.  She notes that when she gets up in the morning she \"cannot move\" but then after she starts moving the pain resolves.  Pain does not wake her in the night.  She has pain in the night when she gets up to go to the bathroom.    CC:  R sided LBP which radiates to the R anterior thigh.  She notes that \"sometimes\" it tingles and feels numb.  She notes that the sx resolve quickly.  Function:  She is retired.  She notes that she tries to walk for exercise but with the weather she cannot walk outside.  She is able to climb the stairs without pain.  She did work as a PCA.  She states that she can carry her laundry.  There is mild discomfort when she picks up the basket.    24: Patient reports that she has had a little decrease in pain.  She notes pain in the pm continues to be the worst.           Recurrent probem    Quality of life: excellent    Pain  Current pain ratin  At best pain ratin  At worst pain rating: 10  /     8/10  Location: in the night when she gets up  Quality: sharp  Relieving factors: heat and change in position  Aggravating factors: sitting and standing    Social Support  Steps to enter house: yes  Stairs in house: yes   Lives with: spouse    Employment status: not working    Diagnostic Tests    FCE comments: Ordered but not taken      Objective     Concurrent Complaints  Negative for night pain, disturbed sleep, cardiac problem, kidney problem and history " of cancer    Neurological Testing     Sensation     Lumbar   Left   Intact: light touch    Right   Intact: light touch    Reflexes   Left   Patellar (L4): normal (2+)    Right   Patellar (L4): normal (2+)      Palpation:  Patient currently has TTP to the R lumbar transverse processes and R QL.  Discomfort appears to be improving.  Active Range of Motion     Lumbar   Flexion:  WFL Restriction level: minimal  Extension: 15 degrees   Left lateral flexion: 15 degrees     Right lateral flexion: 15 degrees  Lumbar    Sitting flexion: repeated movements  Pain intensity: worse                                                pain improved 25%  Standing extension: repeated movements  Lying extension: sustained positions  Pain location: no change    Strength/Myotome Testing     Lumbar   Left   Normal strength    Right Hip                                        2/8/24  Planes of Motion   Flexion: 5  Abduction: 4                                     5  Adduction: 5    Right Knee   Extension: 5    Right Ankle/Foot   Dorsiflexion: 4                                 5    Tests     Lumbar     Right   Negative passive SLR and slump test.     Right Pelvic Girdle/Sacrum   Negative: active SLR test.     Right Hip   Negative MILLY.     Functional Assessment        Single Leg Stance - Eyes Open   Left  Trial 1: 3 seconds  Trial 2: 7 seconds    Right  Trial 1: 3 seconds  Trial 2: 7 seconds    General Comments:    Lower quarter screen   Hips: unremarkable  Knees: unremarkable    Ankle/Foot Comments   Decreased DF on the R

## 2024-02-13 ENCOUNTER — APPOINTMENT (OUTPATIENT)
Dept: PHYSICAL THERAPY | Facility: CLINIC | Age: 74
End: 2024-02-13
Payer: COMMERCIAL

## 2024-02-14 ENCOUNTER — OFFICE VISIT (OUTPATIENT)
Dept: PHYSICAL THERAPY | Facility: CLINIC | Age: 74
End: 2024-02-14
Payer: COMMERCIAL

## 2024-02-14 DIAGNOSIS — M54.41 CHRONIC RIGHT-SIDED LOW BACK PAIN WITH RIGHT-SIDED SCIATICA: Primary | ICD-10-CM

## 2024-02-14 DIAGNOSIS — G89.29 CHRONIC RIGHT-SIDED LOW BACK PAIN WITH RIGHT-SIDED SCIATICA: Primary | ICD-10-CM

## 2024-02-14 PROCEDURE — 97140 MANUAL THERAPY 1/> REGIONS: CPT | Performed by: PHYSICAL THERAPIST

## 2024-02-14 PROCEDURE — 97110 THERAPEUTIC EXERCISES: CPT

## 2024-02-14 NOTE — PROGRESS NOTES
"Daily Note     Today's date: 2024  Patient name: Cris Infante  : 1950  MRN: [id number]  Referring provider: Nciola Marquez*  Dx: No diagnosis found.               Subjective:       Objective: See treatment diary below      Assessment: Tolerated treatment well. Patient exhibited good technique with therapeutic exercises      Plan: Continue per plan of care.      Precautions: NA  Access Code: [email or web address]  URL: [email or web address]  Date: 2024  Prepared by: Es Walters    Exercises  - Standing Lumbar Extension  - 5 x daily - 7 x weekly - 1 sets - 5 reps - 5 hold  - Supine Lower Trunk Rotation  - 2 x daily - 7 x weekly - 1 sets - 5 reps - 20 hold    Manuals    Jms L2-3 R transverse MW     10' 10  10 MW 8   REV          10'   IASTM QL MW                         Neuro Re-Ed             SLS      np 10\" x 10  10 x :10 10\" x 10                                            Ball roll forward/to L 10\" x 20      10 x :15  20 x :15 20 x 10\"   20 x :10                             Ther Ex             Standing trunk extension      dc DC      Bike  6     6' 6 6' 5 6'   LTR to the L 5 x 20\"      15\" x 5 15\" x 5  5 x ;15 15\"  x  5 5 x :20                             Supine hip abd/add isometrics 5\" x 20      10 x :05 5\" 2 x 10 5\" 2 x 10 5\" 2 x 10  2 x 10 :05   Seated lumbar extension 5\"x  10      05 x 5 5\" x 10  10 x :05 5\" x 10  10 x :05   Side stepping  3 laps             Clamshells -R          X10   Bridges  5\" x 10        5 x :05 5\" x 5  5 x :10   Ther Activity                                       Gait Training                                       Modalities                          MH                                    "

## 2024-02-15 ENCOUNTER — OFFICE VISIT (OUTPATIENT)
Dept: PHYSICAL THERAPY | Facility: CLINIC | Age: 74
End: 2024-02-15
Payer: COMMERCIAL

## 2024-02-15 DIAGNOSIS — G89.29 CHRONIC RIGHT-SIDED LOW BACK PAIN WITH RIGHT-SIDED SCIATICA: Primary | ICD-10-CM

## 2024-02-15 DIAGNOSIS — M54.41 CHRONIC RIGHT-SIDED LOW BACK PAIN WITH RIGHT-SIDED SCIATICA: Primary | ICD-10-CM

## 2024-02-15 PROCEDURE — 97140 MANUAL THERAPY 1/> REGIONS: CPT | Performed by: PHYSICAL THERAPIST

## 2024-02-15 PROCEDURE — 97110 THERAPEUTIC EXERCISES: CPT | Performed by: PHYSICAL THERAPIST

## 2024-02-15 NOTE — PROGRESS NOTES
"Daily Note     Today's date: 2/15/2024  Patient name: Cris Infante  : 1950  MRN: [id number]  Referring provider: Nicola Marquez*  Dx: No diagnosis found.               Subjective: Patient reports a little bit of improvement after LV      Objective: See treatment diary below      Assessment: Tolerated treatment well. Patient would benefit from continued PT      Plan: Continue per plan of care.      Precautions: NA  Access Code: [email or web address]  URL: [email or web address]  Date: 2024  Prepared by: Es Walters    Exercises  - Standing Lumbar Extension  - 5 x daily - 7 x weekly - 1 sets - 5 reps - 5 hold  - Supine Lower Trunk Rotation  - 2 x daily - 7 x weekly - 1 sets - 5 reps - 20 hold    Manuals 2/14 2/15    1/25 1/30 2/1 2/6 2/8   Jms L2-3 R transverse MW     10' 10  10 MW 8   REV          10'   IASTM QL MW 8'           (-) pressure iastm  5'           Neuro Re-Ed             SLS      np 10\" x 10  10 x :10 10\" x 10                                            Ball roll forward/to L 10\" x 20      10 x :15  20 x :15 20 x 10\"   20 x :10                             Ther Ex             Standing trunk extension      dc DC      Bike  6 6    6' 6 6' 5 6'   LTR to the L 5 x 20\"  5 x :20    15\" x 5 15\" x 5  5 x ;15 15\"  x  5 5 x :20                Standing hip abd - R  X 10           Supine hip abd isometrics 5\" x 20  20 x :05    10 x :05 5\" 2 x 10 5\" 2 x 10 5\" 2 x 10  2 x 10 :05   Seated lumbar extension 5\"x  10      05 x 5 5\" x 10  10 x :05 5\" x 10  10 x :05   Side stepping  3 laps  3 laps           Clamshells -R          X10   Bridges  5\" x 10  10 x :05      5 x :05 5\" x 5  5 x :10   Ther Activity                                       Gait Training                                       Modalities                          MH                                      "

## 2024-02-18 PROBLEM — Z00.00 MEDICARE ANNUAL WELLNESS VISIT, SUBSEQUENT: Status: RESOLVED | Noted: 2023-12-20 | Resolved: 2024-02-18

## 2024-02-19 ENCOUNTER — OFFICE VISIT (OUTPATIENT)
Dept: PHYSICAL THERAPY | Facility: CLINIC | Age: 74
End: 2024-02-19
Payer: COMMERCIAL

## 2024-02-19 DIAGNOSIS — G89.29 CHRONIC RIGHT-SIDED LOW BACK PAIN WITH RIGHT-SIDED SCIATICA: Primary | ICD-10-CM

## 2024-02-19 DIAGNOSIS — M54.41 CHRONIC RIGHT-SIDED LOW BACK PAIN WITH RIGHT-SIDED SCIATICA: Primary | ICD-10-CM

## 2024-02-19 PROCEDURE — 97112 NEUROMUSCULAR REEDUCATION: CPT

## 2024-02-19 PROCEDURE — 97110 THERAPEUTIC EXERCISES: CPT

## 2024-02-19 NOTE — PROGRESS NOTES
"Daily Note     Today's date: 2024  Patient name: Cris Infante  : 1950  MRN: [id number]  Referring provider: Nicola Marquez*  Dx:   Encounter Diagnosis     ICD-10-CM    1. Chronic right-sided low back pain with right-sided sciatica  M54.41     G89.29                      Subjective: Patient offers no new complaints at this time.       Objective: See treatment diary below      Assessment: Tolerated treatment well. Patient would benefit from continued PT      Plan: Continue per plan of care.      Precautions: NA  Access Code: [email or web address]  URL: [email or web address]  Date: 2024  Prepared by: Es Walters    Exercises  - Standing Lumbar Extension  - 5 x daily - 7 x weekly - 1 sets - 5 reps - 5 hold  - Supine Lower Trunk Rotation  - 2 x daily - 7 x weekly - 1 sets - 5 reps - 20 hold    Manuals 2/14 2/15 2/19   1/25 1/30 2/1 2/6 2/8   Jms L2-3 R transverse MW     10' 10  10 MW 8   REV          10'   IASTM QL MW 8' HA          (-) pressure iastm  5'           Neuro Re-Ed             SLS      np 10\" x 10  10 x :10 10\" x 10                                            Ball roll forward/to L 10\" x 20   10\" x20   10 x :15  20 x :15 20 x 10\"   20 x :10                             Ther Ex             Standing trunk extension      dc DC      Bike  6 6 6'   6' 6 6' 5 6'   LTR to the L 5 x 20\"  5 x :20 5\"x20    15\" x 5 15\" x 5  5 x ;15 15\"  x  5 5 x :20                Standing hip abd - R  X 10 X15           Supine hip abd isometrics 5\" x 20  20 x :05 20x5\"   10 x :05 5\" 2 x 10 5\" 2 x 10 5\" 2 x 10  2 x 10 :05   Seated lumbar extension 5\"x  10      05 x 5 5\" x 10  10 x :05 5\" x 10  10 x :05   Side stepping  3 laps  3 laps 3 laps          Clamshells -R          X10   Bridges  5\" x 10  10 x :05 10x:05     5 x :05 5\" x 5  5 x :10   Ther Activity                                       Gait Training                                       Modalities                          MH           "

## 2024-02-21 ENCOUNTER — OFFICE VISIT (OUTPATIENT)
Dept: PHYSICAL THERAPY | Facility: CLINIC | Age: 74
End: 2024-02-21
Payer: COMMERCIAL

## 2024-02-21 DIAGNOSIS — G89.29 CHRONIC RIGHT-SIDED LOW BACK PAIN WITH RIGHT-SIDED SCIATICA: Primary | ICD-10-CM

## 2024-02-21 DIAGNOSIS — M54.41 CHRONIC RIGHT-SIDED LOW BACK PAIN WITH RIGHT-SIDED SCIATICA: Primary | ICD-10-CM

## 2024-02-21 PROBLEM — Z01.419 ENCOUNTER FOR GYNECOLOGICAL EXAMINATION (GENERAL) (ROUTINE) WITHOUT ABNORMAL FINDINGS: Status: RESOLVED | Noted: 2018-11-26 | Resolved: 2024-02-21

## 2024-02-21 PROCEDURE — 97110 THERAPEUTIC EXERCISES: CPT

## 2024-02-21 NOTE — PROGRESS NOTES
"Daily Note     Today's date: 2024  Patient name: Cris Infante  : 1950  MRN: [id number]  Referring provider: Nicola Marquez*  Dx: No diagnosis found.               Subjective: Patient states that she has noticed an improvement with the intensity of pain at night.      Objective: See treatment diary below      Assessment: Tolerated treatment well. Patient exhibited good technique with therapeutic exercises      Plan: Continue per plan of care.      Precautions: NA  Access Code: [email or web address]  URL: [email or web address]  Date: 2024  Prepared by: Es Walters    Exercises  - Standing Lumbar Extension  - 5 x daily - 7 x weekly - 1 sets - 5 reps - 5 hold  - Supine Lower Trunk Rotation  - 2 x daily - 7 x weekly - 1 sets - 5 reps - 20 hold    Manuals 2/14 2/15 2/19 2/21         Jms L2-3 R transverse MW            REV             IASTM QL MW 8' HA          (-) pressure iastm  5'           Neuro Re-Ed             SLS                                                    Ball roll forward/to L 10\" x 20   10\" x20 10\"x 20                                    Ther Ex             Standing trunk extension             Bike  6 6 6' 6         LTR to the L 5 x 20\"  5 x :20 5\"x20  5 x 20\"                       Standing hip abd - R  X 10 X15  15x         Supine hip abd isometrics 5\" x 20  20 x :05 20x5\" 5\" x 20          Seated lumbar extension 5\"x  10             Side stepping  3 laps  3 laps 3 laps 4 laps          Clamshells -R             Bridges  5\" x 10  10 x :05 10x:05 5\" x 10          Ther Activity                                       Gait Training                                       Modalities                          MH                                          "

## 2024-02-27 ENCOUNTER — OFFICE VISIT (OUTPATIENT)
Dept: PHYSICAL THERAPY | Facility: CLINIC | Age: 74
End: 2024-02-27
Payer: COMMERCIAL

## 2024-02-27 DIAGNOSIS — G89.29 CHRONIC RIGHT-SIDED LOW BACK PAIN WITH RIGHT-SIDED SCIATICA: Primary | ICD-10-CM

## 2024-02-27 DIAGNOSIS — M54.41 CHRONIC RIGHT-SIDED LOW BACK PAIN WITH RIGHT-SIDED SCIATICA: Primary | ICD-10-CM

## 2024-02-27 PROCEDURE — 97112 NEUROMUSCULAR REEDUCATION: CPT | Performed by: PHYSICAL THERAPIST

## 2024-02-27 PROCEDURE — 97110 THERAPEUTIC EXERCISES: CPT | Performed by: PHYSICAL THERAPIST

## 2024-02-27 NOTE — PROGRESS NOTES
"Daily Note     Today's date: 2024  Patient name: Cris Infante  : 1950  MRN: [id number]  Referring provider: Nicola Marquez*  Dx:   Encounter Diagnosis     ICD-10-CM    1. Chronic right-sided low back pain with right-sided sciatica  M54.41     G89.29                      Subjective: Patient continues to report pain with waking in the night.      Objective: See treatment diary below      Assessment: Tolerated treatment well. Patient would benefit from continued PT  Patient able to hold bridge with less pain       Plan: Continue per plan of care.      Precautions: NA  Access Code: [email or web address]  URL: [email or web address]  Date: 2024  Prepared by: Es Waletrs    Exercises  - Standing Lumbar Extension  - 5 x daily - 7 x weekly - 1 sets - 5 reps - 5 hold  - Supine Lower Trunk Rotation  - 2 x daily - 7 x weekly - 1 sets - 5 reps - 20 hold    Manuals 2/14 2/15 2/19 2/21 2/27        Jms L2-3 R transverse MW            REV             IASTM QL MW 8' HA          (-) pressure iastm  5'           Neuro Re-Ed             SLS     R 5 : x10                                               Ball roll forward/to L 10\" x 20   10\" x20 10\"x 20  10\" x 20                                  Ther Ex             Standing trunk extension             Bike  6 6 6' 6 6'        LTR to the L 5 x 20\"  5 x :20 5\"x20  5 x 20\"  5 x :20                     Standing hip abd - B  X 10 X15  15x X15 VCs        Supine hip abd isometrics 5\" x 20  20 x :05 20x5\" 5\" x 20  dc        Seated lumbar extension 5\"x  10             Side stepping  3 laps  3 laps 3 laps 4 laps  5 laps          Clamshells - supine     GTB x 20        Bridges  with TB 5\" x 10  10 x :05 10x:05 5\" x 10  10 x :05        Ther Activity                                       Gait Training                                       Modalities                          MH                                            "

## 2024-02-29 ENCOUNTER — OFFICE VISIT (OUTPATIENT)
Dept: PHYSICAL THERAPY | Facility: CLINIC | Age: 74
End: 2024-02-29
Payer: COMMERCIAL

## 2024-02-29 DIAGNOSIS — G89.29 CHRONIC RIGHT-SIDED LOW BACK PAIN WITH RIGHT-SIDED SCIATICA: Primary | ICD-10-CM

## 2024-02-29 DIAGNOSIS — M54.41 CHRONIC RIGHT-SIDED LOW BACK PAIN WITH RIGHT-SIDED SCIATICA: Primary | ICD-10-CM

## 2024-02-29 PROCEDURE — 97112 NEUROMUSCULAR REEDUCATION: CPT | Performed by: PHYSICAL THERAPIST

## 2024-02-29 PROCEDURE — 97110 THERAPEUTIC EXERCISES: CPT | Performed by: PHYSICAL THERAPIST

## 2024-02-29 NOTE — PROGRESS NOTES
"Daily Note     Today's date: 2024  Patient name: Cris Infante  : 1950  MRN: [id number]  Referring provider: Nicola Marquez*  Dx:   Encounter Diagnosis     ICD-10-CM    1. Chronic right-sided low back pain with right-sided sciatica  M54.41     G89.29                      Subjective: Patient reports that she continues to have a little improvement.        Objective: See treatment diary below      Assessment: Tolerated treatment well. Patient would benefit from continued PT      Plan: Continue per plan of care.      Precautions: NA  Access Code: [email or web address]  URL: [email or web address]  Date: 2024  Prepared by: Es Walters    Exercises  - Standing Lumbar Extension  - 5 x daily - 7 x weekly - 1 sets - 5 reps - 5 hold  - Supine Lower Trunk Rotation  - 2 x daily - 7 x weekly - 1 sets - 5 reps - 20 hold    Manuals 2/14 2/15 2/19 2/21 2/27 2/29       Jms L2-3 R transverse MW            REV             IASTM QL MW 8' HA          (-) pressure iastm  5'           Neuro Re-Ed             SLS     R 5 : x10 5 x :10                                              Ball roll forward/to L 10\" x 20   10\" x20 10\"x 20  10\" x 20 20 x \":10                                 Ther Ex             Standing trunk extension             Bike  6 6 6' 6 6' 6'       LTR to the L 5 x 20\"  5 x :20 5\"x20  5 x 20\"  5 x :20 5 x :20                    Standing hip abd - B  X 10 X15  15x X15 VCs X 15       Supine hip abd isometrics 5\" x 20  20 x :05 20x5\" 5\" x 20  dc        Seated lumbar extension 5\"x  10             Side stepping  3 laps  3 laps 3 laps 4 laps  5 laps   5 laps RTB       Clamshells - supine     GTB x 20 GTB        Bridges  with TB 5\" x 10  10 x :05 10x:05 5\" x 10  10 x :05 15 x :05       Ther Activity                                       Gait Training                                       Modalities                          MH                                              "

## 2024-03-05 ENCOUNTER — OFFICE VISIT (OUTPATIENT)
Dept: PHYSICAL THERAPY | Facility: CLINIC | Age: 74
End: 2024-03-05
Payer: COMMERCIAL

## 2024-03-05 DIAGNOSIS — G89.29 CHRONIC RIGHT-SIDED LOW BACK PAIN WITH RIGHT-SIDED SCIATICA: Primary | ICD-10-CM

## 2024-03-05 DIAGNOSIS — M54.41 CHRONIC RIGHT-SIDED LOW BACK PAIN WITH RIGHT-SIDED SCIATICA: Primary | ICD-10-CM

## 2024-03-05 PROCEDURE — 97112 NEUROMUSCULAR REEDUCATION: CPT

## 2024-03-05 PROCEDURE — 97110 THERAPEUTIC EXERCISES: CPT

## 2024-03-05 NOTE — PROGRESS NOTES
"Daily Note     Today's date: 3/5/2024  Patient name: Cris Infante  : 1950  MRN: [id number]  Referring provider: Nicola Marquez*  Dx:   Encounter Diagnosis     ICD-10-CM    1. Chronic right-sided low back pain with right-sided sciatica  M54.41     G89.29                      Subjective: Pt report she is doing well.       Objective: See treatment diary below      Assessment: Tolerated treatment well. Pt performed all exercises without issue, continue to progress to tolerance. Patient demonstrated fatigue post treatment, exhibited good technique with therapeutic exercises, and would benefit from continued PT      Plan: Continue per plan of care.      Precautions: NA  Access Code: [email or web address]  URL: [email or web address]  Date: 2024  Prepared by: Es Walters    Exercises  - Standing Lumbar Extension  - 5 x daily - 7 x weekly - 1 sets - 5 reps - 5 hold  - Supine Lower Trunk Rotation  - 2 x daily - 7 x weekly - 1 sets - 5 reps - 20 hold    Manuals 2/14 2/15 2/19 2/21 2/27 2/29 3      Jms L2-3 R transverse MW            REV             IASTM QL MW 8' HA          (-) pressure iastm  5'           Neuro Re-Ed             SLS     R 5 : x10 5 x :10 5x10\"                                             Ball roll forward/to L 10\" x 20   10\" x20 10\"x 20  10\" x 20 20 x \":10 20 x 10\"                                Ther Ex             Standing trunk extension       Seated 10x10\"      Bike  6 6 6' 6 6' 6' 6'      LTR to the L 5 x 20\"  5 x :20 5\"x20  5 x 20\"  5 x :20 5 x :20 5 x 20\"                   Standing hip abd - B  X 10 X15  15x X15 VCs X 15 2x10      Supine hip abd isometrics 5\" x 20  20 x :05 20x5\" 5\" x 20  dc        Seated lumbar extension 5\"x  10             Side stepping  3 laps  3 laps 3 laps 4 laps  5 laps   5 laps RTB 5 laps TRB      Clamshells - supine     GTB x 20 GTB  GTB 20x      Bridges  with TB 5\" x 10  10 x :05 10x:05 5\" x 10  10 x :05 15 x :05 2x10 5\"      Ther Activity           "                             Gait Training                                       Modalities                          MH

## 2024-03-07 ENCOUNTER — OFFICE VISIT (OUTPATIENT)
Dept: PHYSICAL THERAPY | Facility: CLINIC | Age: 74
End: 2024-03-07
Payer: COMMERCIAL

## 2024-03-07 DIAGNOSIS — G89.29 CHRONIC RIGHT-SIDED LOW BACK PAIN WITH RIGHT-SIDED SCIATICA: Primary | ICD-10-CM

## 2024-03-07 DIAGNOSIS — M54.41 CHRONIC RIGHT-SIDED LOW BACK PAIN WITH RIGHT-SIDED SCIATICA: Primary | ICD-10-CM

## 2024-03-07 PROCEDURE — 97112 NEUROMUSCULAR REEDUCATION: CPT

## 2024-03-07 PROCEDURE — 97110 THERAPEUTIC EXERCISES: CPT

## 2024-03-07 NOTE — PROGRESS NOTES
"Daily Note     Today's date: 3/7/2024  Patient name: Cris Infante  : 1950  MRN: [id number]  Referring provider: Nicola Marquez*  Dx: No diagnosis found.               Subjective: Patient states that she continues to notice the most discomfort at night.       Objective: See treatment diary below      Assessment: Tolerated treatment well. Patient exhibited good technique with therapeutic exercises      Plan: Continue per plan of care.      Precautions: NA  Access Code: [email or web address]  URL: [email or web address]  Date: 2024  Prepared by: Es Walters    Exercises  - Standing Lumbar Extension  - 5 x daily - 7 x weekly - 1 sets - 5 reps - 5 hold  - Supine Lower Trunk Rotation  - 2 x daily - 7 x weekly - 1 sets - 5 reps - 20 hold    Manuals 2/14 2/15 2/19 2/21 2/27 2/29 3/5 3/7     Jms L2-3 R transverse MW            REV             IASTM QL MW 8' HA          (-) pressure iastm  5'           Neuro Re-Ed             SLS     R 5 : x10 5 x :10 5x10\" 10\"x  4                                             Ball roll forward/to L 10\" x 20   10\" x20 10\"x 20  10\" x 20 20 x \":10 20 x 10\" 10\" x 10                                Ther Ex             Standing trunk extension       Seated 10x10\" Seated 5\" x 10      Bike  6 6 6' 6 6' 6' 6' 6     LTR to the L 5 x 20\"  5 x :20 5\"x20  5 x 20\"  5 x :20 5 x :20 5 x 20\" 5 x20\"                  Standing hip abd - B  X 10 X15  15x X15 VCs X 15 2x10 2 x 10      Supine hip abd isometrics 5\" x 20  20 x :05 20x5\" 5\" x 20  dc        Seated lumbar extension 5\"x  10             Side stepping  3 laps  3 laps 3 laps 4 laps  5 laps   5 laps RTB 5 laps TRB 5 laps RTB      Clamshells - supine     GTB x 20 GTB  GTB 20x GTB x 20      Bridges  with TB 5\" x 10  10 x :05 10x:05 5\" x 10  10 x :05 15 x :05 2x10 5\" 5\" x 20      Ther Activity                                       Gait Training                                       Modalities                          MH           "

## 2024-03-12 ENCOUNTER — RA CDI HCC (OUTPATIENT)
Dept: OTHER | Facility: HOSPITAL | Age: 74
End: 2024-03-12

## 2024-03-14 ENCOUNTER — EVALUATION (OUTPATIENT)
Dept: PHYSICAL THERAPY | Facility: CLINIC | Age: 74
End: 2024-03-14
Payer: COMMERCIAL

## 2024-03-14 ENCOUNTER — APPOINTMENT (OUTPATIENT)
Dept: PHYSICAL THERAPY | Facility: CLINIC | Age: 74
End: 2024-03-14
Payer: COMMERCIAL

## 2024-03-14 DIAGNOSIS — M54.41 CHRONIC RIGHT-SIDED LOW BACK PAIN WITH RIGHT-SIDED SCIATICA: Primary | ICD-10-CM

## 2024-03-14 DIAGNOSIS — G89.29 CHRONIC RIGHT-SIDED LOW BACK PAIN WITH RIGHT-SIDED SCIATICA: Primary | ICD-10-CM

## 2024-03-14 PROCEDURE — 97140 MANUAL THERAPY 1/> REGIONS: CPT | Performed by: PHYSICAL THERAPIST

## 2024-03-14 PROCEDURE — 97110 THERAPEUTIC EXERCISES: CPT | Performed by: PHYSICAL THERAPIST

## 2024-03-14 NOTE — PROGRESS NOTES
"PT Re-evaluation     Today's date: 3/14/24  Patient name: Cris Infante  : 1950  MRN: [id number]  Referring provider: Nicola Marquez*  Dx:   Encounter Diagnosis     ICD-10-CM    1. Chronic right-sided low back pain with right-sided sciatica  M54.41 Ambulatory Referral to Physical Therapy    G89.29                      Assessment:    3/14/24:  Cris has been attending physical therapy for her R LBP.  She does continue to have c/o of R LBP particularly with movement at night.  She notes very little change in her sx since initiating treatment.  She does have improvement in her strength.  She is seeing her MD this next week therefore, we will hold PT at this time for possible referral to pain management.  Thank you for your referral.     Cris has been attending physical therapy due to LBP.  She has made some improvement in pain complaint particularly during the day.  She does continue to have discomfort in the pm.  Patient is challenged by performing a \"bridge\" due to weakness in the hips and core.  She would continue to benefit from skilled physical therapy to achieve maximal core strength and abolish pain.  She is in agreement with the plan of care.        Assessment details: Cris Infante is a 73 y.o. female with Chronic right-sided low back pain with right-sided sciatica.   She presents with pain, decreased strength, decreased ROM, and postural  dysfunction. Due to these impairments, Patient has difficulty performing a/iadls, recreational activities and engaging in social activities. Patient's clinical presentation is consistent with their referring diagnosis. Patient would benefit from skilled physical therapy to address their aforementioned impairments, improve their level of function and to improve their overall quality of life.  has been given a home exercise program and is in agreement with the plan of care.  Thank you for your referral.     Impairments: abnormal or restricted ROM, impaired " "balance, impaired physical strength, lacks appropriate home exercise program and pain with function    Symptom irritability: lowUnderstanding of Dx/Px/POC: excellent  Plan  Patient would benefit from: skilled physical therapy  Referral necessary: No  Planned therapy interventions: neuromuscular re-education, strengthening, stretching and home exercise program  Frequency: 2x week  Duration in visits: 20  Duration in weeks: 20  Plan of Care beginning date: 2024  Plan of Care expiration date: 3/14/24  Treatment plan discussed with: patient        Subjective Evaluation    History of Present Illness  Mechanism of injury: Patient reports insidious onset of LBP.  She reports that when she got constipated she began having pain on the R side.  If she is sitting she has no pain but when she goes to stand up she has pain which radiates to the front of her thigh into the R knee.  She states that when she gets up and walking the pain resolves.  She notes that when she gets up in the morning she \"cannot move\" but then after she starts moving the pain resolves.  Pain does not wake her in the night.  She has pain in the night when she gets up to go to the bathroom.    CC:  R sided LBP which radiates to the R anterior thigh.  She notes that \"sometimes\" it tingles and feels numb.  She notes that the sx resolve quickly.  Function:  She is retired.  She notes that she tries to walk for exercise but with the weather she cannot walk outside.  She is able to climb the stairs without pain.  She did work as a PCA.  She states that she can carry her laundry.  There is mild discomfort when she picks up the basket.    24: Patient reports that she has had a little decrease in pain.  She notes pain in the pm continues to be the worst.     3/14/24:  Patient continues to report pain in the PM with little change.            Recurrent probem    Quality of life: excellent    Pain  Current pain ratin                                         "     0/10  At best pain ratin  At worst pain rating: 10  /     8/10   /                       7/10  Location: in the night when she gets up  Quality: sharp  Relieving factors: heat and change in position  Aggravating factors: sitting and standing    Social Support  Steps to enter house: yes  Stairs in house: yes   Lives with: spouse    Employment status: not working    Diagnostic Tests    FCE comments: Ordered but not taken      Objective     Concurrent Complaints  Negative for night pain, disturbed sleep, cardiac problem, kidney problem and history of cancer    Neurological Testing     Sensation     Lumbar   Left   Intact: light touch    Right   Intact: light touch    Reflexes   Left   Patellar (L4): normal (2+)    Right   Patellar (L4): normal (2+)      Palpation:  Patient currently has TTP to the R lumbar transverse processes and R QL.  Discomfort appears to be improving.  Active Range of Motion     Lumbar                                                                              3/14  Flexion:  WFL Restriction level: minimal  Extension: 15 degrees   Left lateral flexion: 15 degrees     Right lateral flexion: 15 degrees  Lumbar    Sitting flexion: repeated movements  Pain intensity: worse                                                pain improved 25%  Standing extension: repeated movements  Lying extension: sustained positions  Pain location: no change    Hip Special tests:    MILLY                                                                            (+) LBP  FADIR                                                                               (-)     Strength/Myotome Testing     Lumbar   Left   Normal strength    Right Hip                                        2/8/24                              3/14  Planes of Motion   Flexion: 5  Abduction: 4                                     5                                    5  Adduction: 5  SLR                                                                                        5  Right Knee   Extension: 5    Right Ankle/Foot   Dorsiflexion: 4                                 5    Tests     Lumbar     Right   Negative passive SLR and slump test.     Right Pelvic Girdle/Sacrum   Negative: active SLR test.     Right Hip   Negative MILLY.     Functional Assessment        Single Leg Stance - Eyes Open                                3/14  Left  Trial 1: 3 seconds                                                      8 sec  Trial 2: 7 seconds    Right  Trial 1: 3 seconds                                            10 sec  Trial 2: 7 seconds    General Comments:    Lower quarter screen   Hips: unremarkable  Knees: unremarkable    Ankle/Foot Comments   Decreased DF on the R

## 2024-03-14 NOTE — PROGRESS NOTES
"Daily Note     Today's date: 3/14/2024  Patient name: Cris Infante  : 1950  MRN: [id number]  Referring provider: Nicola Marquez*  Dx:   Encounter Diagnosis     ICD-10-CM    1. Chronic right-sided low back pain with right-sided sciatica  M54.41     G89.29                      Subjective: See REV      Objective: See treatment diary below      Assessment: Tolerated treatment well. Patient would benefit from continued PT      Plan: Potential discharge next visit.     Precautions: NA  Access Code: [email or web address]  URL: [email or web address]  Date: 2024  Prepared by: Es Walters    Exercises  - Standing Lumbar Extension  - 5 x daily - 7 x weekly - 1 sets - 5 reps - 5 hold  - Supine Lower Trunk Rotation  - 2 x daily - 7 x weekly - 1 sets - 5 reps - 20 hold    Manuals 2/14 2/15 2/19 2/21 2/27 2/29 3/5 3/7 3/14    Jms L2-3 R transverse MW            REV         10    IASTM QL MW 8' HA          (-) pressure iastm  5'           Neuro Re-Ed             SLS     R 5 : x10 5 x :10 5x10\" 10\"x  4  5 x :10                                           Ball roll forward/to L 10\" x 20   10\" x20 10\"x 20  10\" x 20 20 x \":10 20 x 10\" 10\" x 10  10 x :10                              Ther Ex              trunk extension       Seated 10x10\" Seated 5\" x 10  10 x :05    Bike  6 6 6' 6 6' 6' 6' 6 6    LTR to the L 5 x 20\"  5 x :20 5\"x20  5 x 20\"  5 x :20 5 x :20 5 x 20\" 5 x20\"                  Standing hip abd - B  X 10 X15  15x X15 VCs X 15 2x10 2 x 10  2 x 20    Supine hip abd isometrics 5\" x 20  20 x :05 20x5\" 5\" x 20  dc        Seated lumbar extension 5\"x  10             Side stepping  3 laps  3 laps 3 laps 4 laps  5 laps   5 laps RTB 5 laps TRB 5 laps RTB  RTB 5 laps    Clamshells - supine     GTB x 20 GTB  GTB 20x GTB x 20  GTB x 20    Bridges  with TB 5\" x 10  10 x :05 10x:05 5\" x 10  10 x :05 15 x :05 2x10 5\" 5\" x 20  20 x :05    Ther Activity                                       Gait Training                "                        Modalities                          MH

## 2024-03-19 ENCOUNTER — HOSPITAL ENCOUNTER (OUTPATIENT)
Dept: RADIOLOGY | Facility: HOSPITAL | Age: 74
Discharge: HOME | End: 2024-03-19
Payer: COMMERCIAL

## 2024-03-19 ENCOUNTER — OFFICE VISIT (OUTPATIENT)
Dept: INTERNAL MEDICINE CLINIC | Facility: CLINIC | Age: 74
End: 2024-03-19
Payer: COMMERCIAL

## 2024-03-19 ENCOUNTER — TELEPHONE (OUTPATIENT)
Age: 74
End: 2024-03-19

## 2024-03-19 ENCOUNTER — APPOINTMENT (OUTPATIENT)
Dept: PHYSICAL THERAPY | Facility: CLINIC | Age: 74
End: 2024-03-19
Payer: COMMERCIAL

## 2024-03-19 ENCOUNTER — APPOINTMENT (OUTPATIENT)
Dept: LAB | Facility: CLINIC | Age: 74
End: 2024-03-19
Payer: COMMERCIAL

## 2024-03-19 VITALS
HEART RATE: 75 BPM | SYSTOLIC BLOOD PRESSURE: 130 MMHG | RESPIRATION RATE: 18 BRPM | BODY MASS INDEX: 31.71 KG/M2 | WEIGHT: 179 LBS | OXYGEN SATURATION: 99 % | HEIGHT: 63 IN | TEMPERATURE: 97.5 F | DIASTOLIC BLOOD PRESSURE: 80 MMHG

## 2024-03-19 DIAGNOSIS — M54.41 CHRONIC RIGHT-SIDED LOW BACK PAIN WITH RIGHT-SIDED SCIATICA: ICD-10-CM

## 2024-03-19 DIAGNOSIS — D53.9 DEFICIENCY ANEMIA: ICD-10-CM

## 2024-03-19 DIAGNOSIS — K59.00 CONSTIPATION, UNSPECIFIED CONSTIPATION TYPE: ICD-10-CM

## 2024-03-19 DIAGNOSIS — M54.41 CHRONIC RIGHT-SIDED LOW BACK PAIN WITH RIGHT-SIDED SCIATICA: Primary | ICD-10-CM

## 2024-03-19 DIAGNOSIS — M85.89 OSTEOPENIA OF MULTIPLE SITES: ICD-10-CM

## 2024-03-19 DIAGNOSIS — G89.29 CHRONIC RIGHT-SIDED LOW BACK PAIN WITH RIGHT-SIDED SCIATICA: ICD-10-CM

## 2024-03-19 DIAGNOSIS — G89.29 CHRONIC RIGHT-SIDED LOW BACK PAIN WITH RIGHT-SIDED SCIATICA: Primary | ICD-10-CM

## 2024-03-19 LAB
BASOPHILS # BLD AUTO: 0.03 THOUSANDS/ÂΜL (ref 0–0.1)
BASOPHILS NFR BLD AUTO: 1 % (ref 0–1)
EOSINOPHIL # BLD AUTO: 0.01 THOUSAND/ÂΜL (ref 0–0.61)
EOSINOPHIL NFR BLD AUTO: 0 % (ref 0–6)
ERYTHROCYTE [DISTWIDTH] IN BLOOD BY AUTOMATED COUNT: 14.8 % (ref 11.6–15.1)
FERRITIN SERPL-MCNC: 52 NG/ML (ref 11–307)
FOLATE SERPL-MCNC: >22.3 NG/ML
HCT VFR BLD AUTO: 38.4 % (ref 34.8–46.1)
HGB BLD-MCNC: 12 G/DL (ref 11.5–15.4)
IMM GRANULOCYTES # BLD AUTO: 0 THOUSAND/UL (ref 0–0.2)
IMM GRANULOCYTES NFR BLD AUTO: 0 % (ref 0–2)
IRON SATN MFR SERPL: 15 % (ref 15–50)
IRON SERPL-MCNC: 44 UG/DL (ref 50–212)
LYMPHOCYTES # BLD AUTO: 2.3 THOUSANDS/ÂΜL (ref 0.6–4.47)
LYMPHOCYTES NFR BLD AUTO: 41 % (ref 14–44)
MCH RBC QN AUTO: 26 PG (ref 26.8–34.3)
MCHC RBC AUTO-ENTMCNC: 31.3 G/DL (ref 31.4–37.4)
MCV RBC AUTO: 83 FL (ref 82–98)
MONOCYTES # BLD AUTO: 0.57 THOUSAND/ÂΜL (ref 0.17–1.22)
MONOCYTES NFR BLD AUTO: 10 % (ref 4–12)
NEUTROPHILS # BLD AUTO: 2.65 THOUSANDS/ÂΜL (ref 1.85–7.62)
NEUTS SEG NFR BLD AUTO: 48 % (ref 43–75)
NRBC BLD AUTO-RTO: 0 /100 WBCS
PLATELET # BLD AUTO: 249 THOUSANDS/UL (ref 149–390)
PMV BLD AUTO: 10.1 FL (ref 8.9–12.7)
RBC # BLD AUTO: 4.62 MILLION/UL (ref 3.81–5.12)
TIBC SERPL-MCNC: 292 UG/DL (ref 250–450)
UIBC SERPL-MCNC: 248 UG/DL (ref 155–355)
VIT B12 SERPL-MCNC: 292 PG/ML (ref 180–914)
WBC # BLD AUTO: 5.56 THOUSAND/UL (ref 4.31–10.16)

## 2024-03-19 PROCEDURE — 83540 ASSAY OF IRON: CPT

## 2024-03-19 PROCEDURE — 84165 PROTEIN E-PHORESIS SERUM: CPT

## 2024-03-19 PROCEDURE — 83550 IRON BINDING TEST: CPT

## 2024-03-19 PROCEDURE — 36415 COLL VENOUS BLD VENIPUNCTURE: CPT

## 2024-03-19 PROCEDURE — 85025 COMPLETE CBC W/AUTO DIFF WBC: CPT

## 2024-03-19 PROCEDURE — G2211 COMPLEX E/M VISIT ADD ON: HCPCS | Performed by: INTERNAL MEDICINE

## 2024-03-19 PROCEDURE — 86334 IMMUNOFIX E-PHORESIS SERUM: CPT

## 2024-03-19 PROCEDURE — 99213 OFFICE O/P EST LOW 20 MIN: CPT | Performed by: INTERNAL MEDICINE

## 2024-03-19 PROCEDURE — 82728 ASSAY OF FERRITIN: CPT

## 2024-03-19 PROCEDURE — 82746 ASSAY OF FOLIC ACID SERUM: CPT

## 2024-03-19 PROCEDURE — 82607 VITAMIN B-12: CPT

## 2024-03-19 PROCEDURE — 72110 X-RAY EXAM L-2 SPINE 4/>VWS: CPT

## 2024-03-19 RX ORDER — MELOXICAM 15 MG/1
15 TABLET ORAL DAILY PRN
Qty: 30 TABLET | Refills: 0 | Status: SHIPPED | OUTPATIENT
Start: 2024-03-19

## 2024-03-19 NOTE — PROGRESS NOTES
Assessment/Plan:    1. Chronic right-sided low back pain with right-sided sciatica  -     XR spine lumbar minimum 4 views non injury; Future; Expected date: 03/19/2024  -     Ambulatory referral to Spine & Pain Management; Future  -     meloxicam (MOBIC) 15 mg tablet; Take 1 tablet (15 mg total) by mouth daily as needed for moderate pain    2. Osteopenia of multiple sites    3. Constipation, unspecified constipation type  -     Ambulatory Referral to Gastroenterology; Future    4. Deficiency anemia  -     CBC and differential; Future  -     Iron Panel (Includes Ferritin, Iron Sat%, Iron, and TIBC); Future  -     Folate; Future  -     Vitamin B12; Future  -     Protein electrophoresis, serum; Future       Discussion/summary/plan:    Patient having a low back pain more on the right side and symptom goes in the right leg but radicular symptoms are little better.  She finished a Medrol Dosepak.  On physical therapy symptoms improved by 50 to 60%.  She was advised to have x-ray of the lumbosacral spine last time but she did not go so advised to go for x-ray today.  She is not take anything for pain so I prescribed meloxicam 50 mg 1 p.o. daily as needed PC for pain for now.  Will refer to spine and pain management specialist for further aggressive recommendation.  Last time her hemoglobin was 11.4 she was advised to have a repeat CBC and check anemia workup which she will go for today.  She has a chronic constipation had a GI evaluation about 4 years ago.  She takes Senokot and Metamucil still having a constipation so we will refer to have a reevaluation by GI specialist.  Takes calcium and vitamin D for her osteopenia.  Advised to exercise and lose weight.    Subjective: Patient presents for follow-up.      Patient ID: Cris Infante is a 73 y.o. female.    HPI  73-year-old female patient presents to follow-up her medical problems.  Patient states that she finished the physical therapy her lower back pain improved by 50 to  60%.  She is not take anything for pain.  Still has a low back pain occasionally gets pain in the right leg.  Complain of constipation despite she is taking Senokot and Metamucil.  Denies any cough, fever, chills.  Denies any chest pain shortness of breath.  Denies any nausea vomiting diarrhea or pain abdomen.    The following portions of the patient's history were reviewed and updated as appropriate:     Past Medical History:  She has a past medical history of Abrasion of knee, bilateral (2022), Allergic rhinitis (2021), Bilateral hearing loss (2022), Bilateral impacted cerumen (2022), BMI 31.0-31.9,adult (2023), BMI 32.0-32.9,adult (2022), BMI 33.0-33.9,adult (2021), Cataract, bilateral, Chronic bilateral low back pain without sciatica (2022), Chronic right-sided low back pain with right-sided sciatica (2023), Constipation, Constipation (2021), COVID-19 (2023), Deficiency anemia (2024), Elevated glucose level, Fatigue, History of anemia, Hypertriglyceridemia, Hypertriglyceridemia (2021), Medicare annual wellness visit, subsequent (2021), Medicare annual wellness visit, subsequent (2022), Medicare annual wellness visit, subsequent (2023), Obesity, Osteopenia, Osteopenia of multiple sites (2022), Pain in both knees (2022), Pain of left heel (2021), Post-menopausal, Prediabetes (2023), Rash, Vitamin D deficiency, and Vitamin D deficiency (2021).,  _______________________________________________________________________  Past Surgical History:   has a past surgical history that includes  section; Cataract extraction, bilateral (); Colonoscopy (2014); Mammo (historical) (2020); and DXA procedure(historical) (2020).,  _______________________________________________________________________  Family History:  family history includes Diabetes in her brother, brother, mother,  sister, and sister; No Known Problems in her brother, brother, daughter, daughter, daughter, daughter, daughter, father, maternal aunt, maternal grandfather, maternal grandmother, paternal aunt, paternal grandfather, paternal grandmother, sister, sister, and son; Other in her mother.,  _______________________________________________________________________  Social History:   reports that she has never smoked. She has never used smokeless tobacco. She reports current alcohol use of about 1.0 standard drink of alcohol per week. She reports that she does not use drugs.,  _______________________________________________________________________  Allergies:  has No Known Allergies..  _______________________________________________________________________  Current Outpatient Medications   Medication Sig Dispense Refill    calcium carbonate (OS-FELICITA) 600 MG tablet Take 600 mg by mouth 2 (two) times a day with meals      cholecalciferol (VITAMIN D3) 1,000 units tablet Take 1,000 Units by mouth daily      glucose blood test strip Use 1 each daily as needed Use as instructed      meloxicam (MOBIC) 15 mg tablet Take 1 tablet (15 mg total) by mouth daily as needed for moderate pain 30 tablet 0    Multiple Vitamin (MULTI-VITAMIN DAILY) TABS Take by mouth      psyllium (METAMUCIL) 58.6 % packet Take 1 packet by mouth as needed      senna-docusate sodium (SENOKOT-S) 8.6-50 mg per tablet Take 2 tablets by mouth 2 (two) times a day       No current facility-administered medications for this visit.     _______________________________________________________________________  Review of Systems   Constitutional:  Negative for chills and fever.   HENT:  Negative for congestion, ear pain, hearing loss, nosebleeds, sinus pain, sore throat and trouble swallowing.    Eyes:  Negative for discharge, redness and visual disturbance.   Respiratory:  Negative for cough, chest tightness and shortness of breath.    Cardiovascular:  Negative for chest  "pain and palpitations.   Gastrointestinal:  Positive for constipation. Negative for abdominal pain, blood in stool, diarrhea, nausea and vomiting.   Genitourinary:  Negative for dysuria, flank pain and hematuria.   Musculoskeletal:  Positive for back pain (Low back pain). Negative for arthralgias, myalgias and neck pain.   Skin:  Negative for color change and rash.   Neurological:  Negative for dizziness, speech difficulty, weakness and headaches.   Hematological:  Does not bruise/bleed easily.   Psychiatric/Behavioral:  Negative for agitation and behavioral problems.            Objective:  Vitals:    03/19/24 1002   BP: 130/80   BP Location: Left arm   Patient Position: Sitting   Cuff Size: Large   Pulse: 75   Resp: 18   Temp: 97.5 °F (36.4 °C)   TempSrc: Temporal   SpO2: 99%   Weight: 81.2 kg (179 lb)   Height: 5' 3\" (1.6 m)     Body mass index is 31.71 kg/m².     Physical Exam  Vitals and nursing note reviewed.   Constitutional:       General: She is not in acute distress.     Appearance: Normal appearance.   HENT:      Head: Normocephalic and atraumatic.      Right Ear: Ear canal and external ear normal.      Left Ear: Ear canal and external ear normal.      Nose: Nose normal.      Mouth/Throat:      Mouth: Mucous membranes are moist.   Eyes:      General: No scleral icterus.        Right eye: No discharge.         Left eye: No discharge.      Extraocular Movements: Extraocular movements intact.      Conjunctiva/sclera: Conjunctivae normal.   Cardiovascular:      Rate and Rhythm: Normal rate and regular rhythm.      Pulses: Normal pulses.      Heart sounds: Normal heart sounds. No murmur heard.  Pulmonary:      Effort: Pulmonary effort is normal. No respiratory distress.      Breath sounds: Normal breath sounds. No wheezing.   Abdominal:      General: Bowel sounds are normal.      Palpations: Abdomen is soft.      Tenderness: There is no abdominal tenderness.   Musculoskeletal:         General: Tenderness " (Tender on the lumbar area.  SLR equivocal on the right side.  Slightly decreased forward bending.) present. Normal range of motion.      Cervical back: Normal range of motion and neck supple. No muscular tenderness.      Right lower leg: No edema.      Left lower leg: No edema.   Skin:     General: Skin is warm.      Findings: No rash.   Neurological:      General: No focal deficit present.      Mental Status: She is alert and oriented to person, place, and time.      Motor: No weakness.   Psychiatric:         Mood and Affect: Mood normal.         Behavior: Behavior normal.

## 2024-03-19 NOTE — TELEPHONE ENCOUNTER
Pt returned call asking to schedule with Dr Jean because referral was for Dr Jean. Explained to pt note was sent to Dr Caldera.

## 2024-03-19 NOTE — PATIENT INSTRUCTIONS
Patient was advised to continue present medications. discussed with the patient medications and laboratory data in detail.  Follow-up with me in  4 weeks as advised.  If any blood test was ordered please do 1 week prior to next appointment unless advise to get earlier.  If you have any questions please call the office [phone]

## 2024-03-19 NOTE — TELEPHONE ENCOUNTER
Patients GI provider:       Number to return call: [phone]    Reason for call: Pt called in regard to referral entered by PCP in regard to constipation. Pt was seen for an office visit on 1/29/2024. Does pt need an office visit?    Scheduled procedure/appointment date if applicable: N/A

## 2024-03-20 ENCOUNTER — TELEPHONE (OUTPATIENT)
Dept: INTERNAL MEDICINE CLINIC | Facility: CLINIC | Age: 74
End: 2024-03-20

## 2024-03-20 NOTE — TELEPHONE ENCOUNTER
LM on VM - there are still 2 lab tests pending and they can take up to a week for the results.  I did print what we have if she still wants to come tomorrow or if she wants to wait that is fine too.

## 2024-03-20 NOTE — TELEPHONE ENCOUNTER
Pt calling back and wanted to come  a copy of her results,  She will come in tomorrow.  Please have printed.

## 2024-03-20 NOTE — TELEPHONE ENCOUNTER
I spoke with patient and provided result and recommendation. Patient repeated back B12 order and verbalized understanding.

## 2024-03-20 NOTE — TELEPHONE ENCOUNTER
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  But there is no fracture.  Advised to see a spine and pain management specialist as advised during last office visit.

## 2024-03-20 NOTE — TELEPHONE ENCOUNTER
I spoke with patient and advised of results. Patient has appointment scheduled with Spine and Pain on 5/8/24.

## 2024-03-21 DIAGNOSIS — R77.8 ABNORMAL SERUM PROTEIN ELECTROPHORESIS: Primary | ICD-10-CM

## 2024-03-21 LAB
ALBUMIN SERPL ELPH-MCNC: 3.97 G/DL (ref 3.2–5.1)
ALBUMIN SERPL ELPH-MCNC: 57.6 % (ref 48–70)
ALPHA1 GLOB SERPL ELPH-MCNC: 0.26 G/DL (ref 0.15–0.47)
ALPHA1 GLOB SERPL ELPH-MCNC: 3.8 % (ref 1.8–7)
ALPHA2 GLOB SERPL ELPH-MCNC: 0.66 G/DL (ref 0.42–1.04)
ALPHA2 GLOB SERPL ELPH-MCNC: 9.6 % (ref 5.9–14.9)
BETA GLOB ABNORMAL SERPL ELPH-MCNC: 0.37 G/DL (ref 0.31–0.57)
BETA1 GLOB SERPL ELPH-MCNC: 5.3 % (ref 4.7–7.7)
BETA2 GLOB SERPL ELPH-MCNC: 5.4 % (ref 3.1–7.9)
BETA2+GAMMA GLOB SERPL ELPH-MCNC: 0.37 G/DL (ref 0.2–0.58)
GAMMA GLOB ABNORMAL SERPL ELPH-MCNC: 1.26 G/DL (ref 0.4–1.66)
GAMMA GLOB SERPL ELPH-MCNC: 18.3 % (ref 6.9–22.3)
IGG/ALB SER: 1.36 {RATIO} (ref 1.1–1.8)
INTERPRETATION UR IFE-IMP: NORMAL
PROT PATTERN SERPL ELPH-IMP: NORMAL
PROT SERPL-MCNC: 6.9 G/DL (ref 6.4–8.2)

## 2024-03-21 PROCEDURE — 86334 IMMUNOFIX E-PHORESIS SERUM: CPT | Performed by: STUDENT IN AN ORGANIZED HEALTH CARE EDUCATION/TRAINING PROGRAM

## 2024-03-21 PROCEDURE — 84165 PROTEIN E-PHORESIS SERUM: CPT | Performed by: STUDENT IN AN ORGANIZED HEALTH CARE EDUCATION/TRAINING PROGRAM

## 2024-03-22 ENCOUNTER — TELEPHONE (OUTPATIENT)
Age: 74
End: 2024-03-22

## 2024-03-22 NOTE — TELEPHONE ENCOUNTER
I spoke with the patient and she does not understand the referral you gave her daughter at her appointment. She would like to discuss further with you before she schedules the appointment with hematology.

## 2024-03-25 ENCOUNTER — TELEPHONE (OUTPATIENT)
Dept: HEMATOLOGY ONCOLOGY | Facility: CLINIC | Age: 74
End: 2024-03-25

## 2024-03-25 NOTE — TELEPHONE ENCOUNTER
Daughter calling for Cris in response to a referral that was received for patient to establish care with Hematology.     Outreach was made to schedule a consultation.    A consultation was scheduled for patient during this call. Patient is scheduled on 5/6/24 at 11am with Dr. Lau at the [facility]

## 2024-04-17 ENCOUNTER — RA CDI HCC (OUTPATIENT)
Dept: OTHER | Facility: HOSPITAL | Age: 74
End: 2024-04-17

## 2024-05-06 ENCOUNTER — OFFICE VISIT (OUTPATIENT)
Dept: HEMATOLOGY ONCOLOGY | Facility: CLINIC | Age: 74
End: 2024-05-06
Payer: COMMERCIAL

## 2024-05-06 VITALS
TEMPERATURE: 97.2 F | BODY MASS INDEX: 31.63 KG/M2 | HEART RATE: 79 BPM | HEIGHT: 63 IN | DIASTOLIC BLOOD PRESSURE: 86 MMHG | OXYGEN SATURATION: 100 % | RESPIRATION RATE: 17 BRPM | SYSTOLIC BLOOD PRESSURE: 136 MMHG | WEIGHT: 178.5 LBS

## 2024-05-06 DIAGNOSIS — R77.8 ABNORMAL SERUM PROTEIN ELECTROPHORESIS: Primary | ICD-10-CM

## 2024-05-06 PROBLEM — M79.672 PAIN OF LEFT HEEL: Status: RESOLVED | Noted: 2021-12-02 | Resolved: 2024-05-06

## 2024-05-06 PROBLEM — K59.00 CONSTIPATION: Status: RESOLVED | Noted: 2021-12-02 | Resolved: 2024-05-06

## 2024-05-06 PROCEDURE — 99204 OFFICE O/P NEW MOD 45 MIN: CPT | Performed by: INTERNAL MEDICINE

## 2024-05-06 NOTE — PROGRESS NOTES
CONSIDER OD TO FOLLOW.  EYE OD, IOL TYPE ADVANCED, POST OPERATIVE TARGET PL, PACKAGE ADVANCED. Oncology Consult Note  Cris Infante 73 y.o. female MRN: [id number]  Unit/Bed#:  Encounter: [id number]      Presenting Complaint: IgG lambda monoclonal gammopathy    History of Presenting Illness: Cris Infante is seen for initial consultation 5/6/2024 at the referral of Nicola Marquez*   73-year-old -American female with history of chronic bilateral lower back pain with sciatica, constipation, COVID-19 infection, advanced degenerative arthritis, hypertriglyceridemia, hearing loss, she was found to have mild anemia on 12/2023 with hemoglobin of 11.4, WBC 4.9, platelets 272,000, MCV 84, normal RDW 1    This happened few weeks after COVID infection    Previous blood work in 2020 hemoglobin 12.4, MCV 83, platelets 247,000, WBC 6.2    As workup she had serum protein electrophoresis showed IgG lambda monoclonal gammopathy, too small to be quantified    She does not smoke or drink    Review denied any headache blurred vision dysuria hematuria melena hematochezia however she reported constipation, no skin rash, dry eyes, night sweats, low-grade fever or weight changes  Review of Systems - As stated in the HPI otherwise the fourteen point review of systems was negative.    Past Medical History:   Diagnosis Date    Abrasion of knee, bilateral 08/31/2022    Allergic rhinitis 12/02/2021    Bilateral hearing loss 04/29/2022    Bilateral impacted cerumen 04/29/2022    BMI 31.0-31.9,adult 12/20/2023    BMI 32.0-32.9,adult 04/29/2022    BMI 33.0-33.9,adult 12/02/2021    Cataract, bilateral     Chronic bilateral low back pain without sciatica 12/08/2022    Chronic right-sided low back pain with right-sided sciatica 12/20/2023    Constipation     Constipation 12/02/2021    COVID-19 12/04/2023    Deficiency anemia 03/19/2024    Elevated glucose level     Fatigue     History of anemia     Hypertriglyceridemia     Hypertriglyceridemia 12/02/2021    Medicare annual wellness visit, subsequent 12/02/2021    Medicare annual  wellness visit, subsequent 12/08/2022    Medicare annual wellness visit, subsequent 12/20/2023    Obesity     Osteopenia     Osteopenia of multiple sites 04/29/2022    Pain in both knees 08/31/2022    Pain of left heel 12/02/2021    Post-menopausal     Prediabetes 12/20/2023    Rash     Vitamin D deficiency     Vitamin D deficiency 12/02/2021       Social History     Socioeconomic History    Marital status: /Civil Union     Spouse name: Not on file    Number of children: Not on file    Years of education: Not on file    Highest education level: Not on file   Occupational History    Not on file   Tobacco Use    Smoking status: Never    Smokeless tobacco: Never   Vaping Use    Vaping status: Never Used   Substance and Sexual Activity    Alcohol use: Yes     Alcohol/week: 1.0 standard drink of alcohol     Types: 1 Cans of beer per week     Comment: occasional    Drug use: No    Sexual activity: Not Currently     Partners: Male   Other Topics Concern    Not on file   Social History Narrative    Lives with spouse    Pap smear: She goes to her gyn for pap smear    Annual eye exam: Advise     Social Determinants of Health     Financial Resource Strain: Low Risk  (12/20/2023)    Overall Financial Resource Strain (CARDIA)     Difficulty of Paying Living Expenses: Not very hard   Food Insecurity: Not on file   Transportation Needs: No Transportation Needs (12/20/2023)    PRAPARE - Transportation     Lack of Transportation (Medical): No     Lack of Transportation (Non-Medical): No   Physical Activity: Not on file   Stress: Not on file   Social Connections: Not on file   Intimate Partner Violence: Not on file   Housing Stability: Not on file       Family History   Problem Relation Age of Onset    Diabetes Mother     Other Mother         Memory impairment, at old age    No Known Problems Father     Diabetes Sister     Diabetes Sister     No Known Problems Sister     No Known Problems Sister     No Known Problems Daughter   "   No Known Problems Daughter     No Known Problems Daughter     No Known Problems Daughter     No Known Problems Daughter     No Known Problems Maternal Grandmother     No Known Problems Maternal Grandfather     No Known Problems Paternal Grandmother     No Known Problems Paternal Grandfather     Diabetes Brother     Diabetes Brother     No Known Problems Brother     No Known Problems Brother     No Known Problems Son     No Known Problems Maternal Aunt     No Known Problems Paternal Aunt        No Known Allergies      Current Outpatient Medications:     calcium carbonate (OS-FELICITA) 600 MG tablet, Take 600 mg by mouth 2 (two) times a day with meals, Disp: , Rfl:     cholecalciferol (VITAMIN D3) 1,000 units tablet, Take 1,000 Units by mouth daily, Disp: , Rfl:     glucose blood test strip, Use 1 each daily as needed Use as instructed, Disp: , Rfl:     meloxicam (MOBIC) 15 mg tablet, Take 1 tablet (15 mg total) by mouth daily as needed for moderate pain, Disp: 30 tablet, Rfl: 0    Multiple Vitamin (MULTI-VITAMIN DAILY) TABS, Take by mouth, Disp: , Rfl:     psyllium (METAMUCIL) 58.6 % packet, Take 1 packet by mouth as needed, Disp: , Rfl:     senna-docusate sodium (SENOKOT-S) 8.6-50 mg per tablet, Take 2 tablets by mouth 2 (two) times a day, Disp: , Rfl:       /86 (BP Location: Left arm, Patient Position: Sitting, Cuff Size: Adult)   Pulse 79   Temp (!) 97.2 °F (36.2 °C) (Temporal)   Resp 17   Ht 5' 3\" (1.6 m)   Wt 81 kg (178 lb 8 oz)   SpO2 100%   BMI 31.62 kg/m²       General Appearance:    Alert, oriented, elderly       Eyes:    PERRL   Ears:    Normal external ear canals, both ears   Nose:   Nares normal, septum midline   Throat:   Mucosa moist. Pharynx without injection.    Neck:   Supple       Lungs:     Clear to auscultation bilaterally   Chest Wall:    No tenderness or deformity    Heart:    Regular rate and rhythm       Abdomen:     Soft, non-tender, bowel sounds +, no organomegaly         "   Extremities:   Extremities no cyanosis or edema       Skin:   no rash or icterus.    Lymph nodes:   Cervical, supraclavicular, and axillary nodes normal   Neurologic:   CNII-XII intact, normal strength, sensation and reflexes     Throughout               No results found for this or any previous visit (from the past 48 hour(s)).      No results found.  ECOG :1      Assessment and plan:    #1.  IgG lambda monoclonal gammopathy of undetermined significance, too small to be quantified    Will repeat the test in 3 to 4 months with kappa lambda light chain, quantitative immunoglobulins she has normal albumin, calcium, creatinine  2.  Mild anemia resolved, happen after COVID infection will repeat CBC in 3 months

## 2024-05-06 NOTE — PROGRESS NOTES
Assessment  1. Spondylolisthesis of lumbar region    2. Chronic right-sided low back pain with right-sided sciatica    3. Lumbar radiculopathy        Plan  The patient's symptoms, history/physical are consistent with pain that is multifactorial in origin.  She has spondylolisthesis that is seen on x-ray imaging which is the likely etiology of her pain complaints.  At this time, I will order an MRI lumbar spine to better evaluate.  Advised we will call with the results and discuss treatment moving forward likely include performing epidural steroid injection.  The patient and her  were amenable to that plan.    My impressions and treatment recommendations were discussed in detail with the patient who verbalized understanding and had no further questions.  Discharge instructions were provided. I personally saw and examined the patient and I agree with the above discussed plan of care.    Orders Placed This Encounter   Procedures   • MRI lumbar spine without contrast     Standing Status:   Future     Standing Expiration Date:   5/8/2028     Scheduling Instructions:      There is no preparation for this test. Please leave your jewelry and valuables at home, wedding rings are the exception. All patients will be required to change into a hospital gown and pants.  Street clothes are not permitted in the MRI.  Magnetic nail polish must be removed prior to arrival for your test. Please bring your insurance cards, a form of photo ID and a list of your medications with you. Arrive 15 minutes prior to your appointment time in order to register. Please bring any prior CT or MRI studies of this area that were not performed at a [facility].            To schedule this appointment, please contact Central Scheduling at [phone].            Prior to your appointment, please make sure you complete the MRI Screening Form when you e-Check in for your appointment. This will be available starting 7 days before your  appointment in [employer]. You may receive an e-mail with an activation code if you do not have a [employer] account. If you do not have access to a device, we will complete your screening at your appointment.     Order Specific Question:   What is the patient's sedation requirement?     Answer:   No Sedation     Order Specific Question:   Does this procedure require the 3T MRI at [facility]?     Answer:   No     Order Specific Question:   Release to patient through [employer]     Answer:   Immediate     Order Specific Question:   Is order priority selected as STAT?     Answer:   No     Order Specific Question:   Reason for Exam     Answer:   right l;ower back pain     No orders of the defined types were placed in this encounter.      History of Present Illness    Cris Infante is a 73 y.o. female referred by Dr. Marquez for right lower back pain that has been present for a year.  Symptoms are moderate rated 3-5/10 on a numeric rating scale felt occasionally described to be pressure-like in the right lower back and radiates into the buttock/hip..  Pain symptoms are aggravated with physical activity including standing, bending, walking.  Treatment history has included physical therapy which has been mildly helpful.  She is prescribed Aloxi cam which is helpful.    I have personally reviewed and/or updated the patient's past medical history, past surgical history, family history, social history, current medications, allergies, and vital signs today.     Review of Systems   Constitutional:  Negative for fever and unexpected weight change.   HENT:  Negative for trouble swallowing.    Eyes:  Negative for visual disturbance.   Respiratory:  Negative for shortness of breath and wheezing.    Cardiovascular:  Negative for chest pain and palpitations.   Gastrointestinal:  Negative for constipation, diarrhea, nausea and vomiting.   Endocrine: Negative for cold intolerance, heat intolerance and polydipsia.   Genitourinary:  Negative  for difficulty urinating and frequency.   Musculoskeletal:  Positive for back pain, gait problem and joint swelling. Negative for arthralgias and myalgias.   Skin:  Negative for rash.   Neurological:  Negative for dizziness, seizures, syncope, weakness and headaches.   Hematological:  Does not bruise/bleed easily.   Psychiatric/Behavioral:  Negative for dysphoric mood.    All other systems reviewed and are negative.      Patient Active Problem List   Diagnosis   • Vitamin D deficiency   • Hypertriglyceridemia   • Hyperglycemia   • Allergic rhinitis   • Encounter for screening for colorectal malignant neoplasm in high risk patient   • Bilateral hearing loss   • Osteopenia of multiple sites   • BMI 32.0-32.9,adult   • Pain in both knees   • Abrasion of knee, bilateral   • Chronic bilateral low back pain without sciatica   • COVID-19   • Prediabetes   • Chronic right-sided low back pain with right-sided sciatica   • Deficiency anemia   • Abnormal serum protein electrophoresis       Past Medical History:   Diagnosis Date   • Abrasion of knee, bilateral 08/31/2022   • Allergic rhinitis 12/02/2021   • Bilateral hearing loss 04/29/2022   • Bilateral impacted cerumen 04/29/2022   • BMI 31.0-31.9,adult 12/20/2023   • BMI 32.0-32.9,adult 04/29/2022   • BMI 33.0-33.9,adult 12/02/2021   • Cataract, bilateral    • Chronic bilateral low back pain without sciatica 12/08/2022   • Chronic right-sided low back pain with right-sided sciatica 12/20/2023   • Constipation    • Constipation 12/02/2021   • COVID-19 12/04/2023   • Deficiency anemia 03/19/2024   • Elevated glucose level    • Fatigue    • History of anemia    • Hypertriglyceridemia    • Hypertriglyceridemia 12/02/2021   • Medicare annual wellness visit, subsequent 12/02/2021   • Medicare annual wellness visit, subsequent 12/08/2022   • Medicare annual wellness visit, subsequent 12/20/2023   • Obesity    • Osteopenia    • Osteopenia of multiple sites 04/29/2022   • Pain in  both knees 2022   • Pain of left heel 2021   • Post-menopausal    • Prediabetes 2023   • Rash    • Vitamin D deficiency    • Vitamin D deficiency 2021       Past Surgical History:   Procedure Laterality Date   • CATARACT EXTRACTION, BILATERAL     •  SECTION     • COLONOSCOPY  2014    Advise f/u colonoscopy; see by [facility] 2020 at office; she is supposed to have a colonoscopy, but due to Covid 19, she wants to hold for now    • DXA PROCEDURE (HISTORICAL)  2020   • MAMMO (HISTORICAL)  2020       Family History   Problem Relation Age of Onset   • Diabetes Mother    • Other Mother         Memory impairment, at old age   • No Known Problems Father    • Diabetes Sister    • Diabetes Sister    • No Known Problems Sister    • No Known Problems Sister    • No Known Problems Daughter    • No Known Problems Daughter    • No Known Problems Daughter    • No Known Problems Daughter    • No Known Problems Daughter    • No Known Problems Maternal Grandmother    • No Known Problems Maternal Grandfather    • No Known Problems Paternal Grandmother    • No Known Problems Paternal Grandfather    • Diabetes Brother    • Diabetes Brother    • No Known Problems Brother    • No Known Problems Brother    • No Known Problems Son    • No Known Problems Maternal Aunt    • No Known Problems Paternal Aunt        Social History     Occupational History   • Not on file   Tobacco Use   • Smoking status: Never   • Smokeless tobacco: Never   Vaping Use   • Vaping status: Never Used   Substance and Sexual Activity   • Alcohol use: Yes     Alcohol/week: 1.0 standard drink of alcohol     Types: 1 Cans of beer per week     Comment: occasional   • Drug use: No   • Sexual activity: Not Currently     Partners: Male       Current Outpatient Medications on File Prior to Visit   Medication Sig   • calcium carbonate (OS-FELICITA) 600 MG tablet Take 600 mg by mouth 2 (two) times a day with meals   •  cholecalciferol (VITAMIN D3) 1,000 units tablet Take 1,000 Units by mouth daily   • glucose blood test strip Use 1 each daily as needed Use as instructed   • meloxicam (MOBIC) 15 mg tablet Take 1 tablet (15 mg total) by mouth daily as needed for moderate pain   • Multiple Vitamin (MULTI-VITAMIN DAILY) TABS Take by mouth   • psyllium (METAMUCIL) 58.6 % packet Take 1 packet by mouth as needed   • senna-docusate sodium (SENOKOT-S) 8.6-50 mg per tablet Take 2 tablets by mouth 2 (two) times a day     No current facility-administered medications on file prior to visit.       No Known Allergies    Physical Exam    /85   Pulse 70   Wt 80.7 kg (178 lb)   BMI 31.53 kg/m²     Constitutional: normal, well developed, well nourished, alert, in no distress and non-toxic and no overt pain behavior.  Eyes: anicteric  HEENT: grossly intact  Neck: supple, symmetric, trachea midline and no masses   Pulmonary:even and unlabored  Cardiovascular:No edema or pitting edema present  Skin:Normal without rashes or lesions and well hydrated  Psychiatric:Mood and affect appropriate  Neurologic:Cranial Nerves II-XII grossly intact  Musculoskeletal:normal    Lumbar Spine Exam  Appearance:  Normal lordosis  Palpation/Tenderness:  right lumbar paraspinal tenderness  Range of Motion:  Flexion:  Minimally limited  with pain  Extension:  Minimally limited  with pain  Motor Strength:  Left hip flexion:  5/5  Left hip extension:  5/5  Right hip flexion:  5/5  Right hip extension:  5/5  Left knee flexion:  5/5  Left knee extension:  5/5  Right knee flexion:  5/5  Right knee extension:  5/5  Left foot dorsiflexion:  5/5  Left foot plantar flexion:  5/5  Right foot dorsiflexion:  5/5  Right foot plantar flexion:  5/5  Reflexes:  Left Patellar:  1+   Right Patellar:  1+   Left Achilles:  1+   Right Achilles:  1+     Imaging    XR LUMBAR SPINE (3/19/2024)     INDICATION:   Lumbago with sciatica, right side. Other chronic pain.      COMPARISON:   None.     VIEWS:  XR SPINE LUMBAR MINIMUM 4 VIEWS NON INJURY     FINDINGS:     There are 5 non rib bearing lumbar vertebral bodies.      There is no evidence of acute fracture or destructive osseous lesion.     Grade I/II spondylolithesis L4 on L5.  Mild to moderate scoliotic deformity.      Multilevel mild to moderate degenerative disease      The pedicles appear intact. No pars defects.     Soft tissues are unremarkable.     IMPRESSION:        No acute osseous abnormality.       Degenerative changes as described.

## 2024-05-08 ENCOUNTER — CONSULT (OUTPATIENT)
Dept: PAIN MEDICINE | Facility: CLINIC | Age: 74
End: 2024-05-08
Payer: COMMERCIAL

## 2024-05-08 VITALS
SYSTOLIC BLOOD PRESSURE: 132 MMHG | BODY MASS INDEX: 31.53 KG/M2 | WEIGHT: 178 LBS | DIASTOLIC BLOOD PRESSURE: 85 MMHG | HEART RATE: 70 BPM

## 2024-05-08 DIAGNOSIS — M43.16 SPONDYLOLISTHESIS OF LUMBAR REGION: Primary | ICD-10-CM

## 2024-05-08 DIAGNOSIS — M54.41 CHRONIC RIGHT-SIDED LOW BACK PAIN WITH RIGHT-SIDED SCIATICA: ICD-10-CM

## 2024-05-08 DIAGNOSIS — G89.29 CHRONIC RIGHT-SIDED LOW BACK PAIN WITH RIGHT-SIDED SCIATICA: ICD-10-CM

## 2024-05-08 DIAGNOSIS — M54.16 LUMBAR RADICULOPATHY: ICD-10-CM

## 2024-05-08 PROCEDURE — 99204 OFFICE O/P NEW MOD 45 MIN: CPT | Performed by: ANESTHESIOLOGY

## 2024-05-13 ENCOUNTER — OFFICE VISIT (OUTPATIENT)
Dept: INTERNAL MEDICINE CLINIC | Facility: CLINIC | Age: 74
End: 2024-05-13
Payer: COMMERCIAL

## 2024-05-13 VITALS
HEART RATE: 82 BPM | RESPIRATION RATE: 18 BRPM | BODY MASS INDEX: 31.01 KG/M2 | HEIGHT: 63 IN | TEMPERATURE: 97.2 F | DIASTOLIC BLOOD PRESSURE: 80 MMHG | OXYGEN SATURATION: 97 % | SYSTOLIC BLOOD PRESSURE: 140 MMHG | WEIGHT: 175 LBS

## 2024-05-13 DIAGNOSIS — G89.29 CHRONIC RIGHT-SIDED LOW BACK PAIN WITHOUT SCIATICA: ICD-10-CM

## 2024-05-13 DIAGNOSIS — M54.50 CHRONIC RIGHT-SIDED LOW BACK PAIN WITHOUT SCIATICA: ICD-10-CM

## 2024-05-13 DIAGNOSIS — D47.2 MONOCLONAL GAMMOPATHY: ICD-10-CM

## 2024-05-13 DIAGNOSIS — M85.89 OSTEOPENIA OF MULTIPLE SITES: ICD-10-CM

## 2024-05-13 DIAGNOSIS — E11.9 TYPE 2 DIABETES MELLITUS WITHOUT COMPLICATION, WITHOUT LONG-TERM CURRENT USE OF INSULIN (HCC): Primary | ICD-10-CM

## 2024-05-13 DIAGNOSIS — M43.16 SPONDYLOLISTHESIS OF LUMBAR REGION: ICD-10-CM

## 2024-05-13 DIAGNOSIS — K59.09 OTHER CONSTIPATION: ICD-10-CM

## 2024-05-13 DIAGNOSIS — R79.89 LOW VITAMIN B12 LEVEL: ICD-10-CM

## 2024-05-13 PROCEDURE — 99213 OFFICE O/P EST LOW 20 MIN: CPT | Performed by: INTERNAL MEDICINE

## 2024-05-13 PROCEDURE — G2211 COMPLEX E/M VISIT ADD ON: HCPCS | Performed by: INTERNAL MEDICINE

## 2024-05-13 RX ORDER — LANOLIN ALCOHOL/MO/W.PET/CERES
1000 CREAM (GRAM) TOPICAL DAILY
COMMUNITY

## 2024-05-13 NOTE — PROGRESS NOTES
Assessment/Plan:    1. Type 2 diabetes mellitus without complication, without long-term current use of insulin ([facility])  Assessment & Plan:    Lab Results   Component Value Date    HGBA1C 6.9 (H) 12/21/2023       Orders:  -     Hemoglobin A1C; Future    2. Chronic right-sided low back pain without sciatica  -     Ambulatory Referral to Physical Therapy; Future    3. Osteopenia of multiple sites    4. Low vitamin B12 level    5. Monoclonal gammopathy    6. BMI 31.0-31.9,adult    7. Other constipation    8. Spondylolisthesis of lumbar region  -     Ambulatory Referral to Physical Therapy; Future       Discussed with the patient new onset of diabetes mellitus.  Hemoglobin A1c 6.9.  Patient's  also has a diabetes mellitus and patient states she knows about the diet.  Will follow blood sugar hemoglobin A1c.  Advised for yearly eye examination.  She has a low back pain was seen by pain management specialist Dr. Boyce was advised for MRI of the lower back but patient does not want MRI.  Patient states now her pain is not going in the right leg now localized to right lower back.  Takes meloxicam as needed for pain.  Patient states physical therapy was effective and she would like to for physical therapy so I requested physical therapy.  Patient states she does not want injection in the lower back.  Her vitamin B12 level was 242 for which she was advised to take vitamin B12 1000 mcg daily.  She was seen by hematologist for monoclonal gammopathy has a follow-up appointment.  Patient has been trying to lose weight.  For constipation she takes her mainly Senokot and as needed Metamucil.  For osteopenia she takes calcium 600 mg twice daily as well as vitamin D supplement.  Advised to exercise and try to lose weight as much as she can.    Subjective: Patient presents for follow-up.      Patient ID: Cris Infante is a 73 y.o. female.    HPI  73-year-old female patient present for follow-up of her medical problems.  She denies  any chest pain, shortness of breath, pain in abdomen.  Denies any cough, fever, chills.  Denies any nausea vomiting diarrhea.  Patient states he gets right-sided lower back pain now he does not go in leg anymore.  She was seen by pain management specialist Dr. Boyce was advised for MRI of the lower back but she does not want MRI as well as lower back injection.  Patient stated physical therapy was effective and she would like to go back for physical therapy.  She takes meloxicam only as needed for pain.  She was seen by hematologist will be going for follow-up appointment with more blood tests August 2024.    The following portions of the patient's history were reviewed and updated as appropriate:     Past Medical History:  She has a past medical history of Abrasion of knee, bilateral (08/31/2022), Allergic rhinitis (12/02/2021), Bilateral hearing loss (04/29/2022), Bilateral impacted cerumen (04/29/2022), BMI 31.0-31.9,adult (12/20/2023), BMI 31.0-31.9,adult (05/13/2024), BMI 32.0-32.9,adult (04/29/2022), BMI 33.0-33.9,adult (12/02/2021), Cataract, bilateral, Chronic bilateral low back pain without sciatica (12/08/2022), Chronic right-sided low back pain with right-sided sciatica (12/20/2023), Constipation, Constipation (12/02/2021), COVID-19 (12/04/2023), Deficiency anemia (03/19/2024), Elevated glucose level, Fatigue, History of anemia, Hypertriglyceridemia, Hypertriglyceridemia (12/02/2021), Low vitamin B12 level (05/13/2024), Medicare annual wellness visit, subsequent (12/02/2021), Medicare annual wellness visit, subsequent (12/08/2022), Medicare annual wellness visit, subsequent (12/20/2023), Monoclonal gammopathy (05/13/2024), Obesity, Osteopenia, Osteopenia of multiple sites (04/29/2022), Pain in both knees (08/31/2022), Pain of left heel (12/02/2021), Post-menopausal, Prediabetes (12/20/2023), Rash, Spondylolisthesis of lumbar region (05/13/2024), Type 2 diabetes mellitus without complication, without  long-term current use of insulin (HCC) (2024), Vitamin D deficiency, and Vitamin D deficiency (2021).,  _______________________________________________________________________  Past Surgical History:   has a past surgical history that includes  section; Cataract extraction, bilateral (); Colonoscopy (2014); Mammo (historical) (2020); and DXA procedure(historical) (2020).,  _______________________________________________________________________  Family History:  family history includes Diabetes in her brother, brother, mother, sister, and sister; No Known Problems in her brother, brother, daughter, daughter, daughter, daughter, daughter, father, maternal aunt, maternal grandfather, maternal grandmother, paternal aunt, paternal grandfather, paternal grandmother, sister, sister, and son; Other in her mother.,  _______________________________________________________________________  Social History:   reports that she has never smoked. She has never used smokeless tobacco. She reports current alcohol use of about 1.0 standard drink of alcohol per week. She reports that she does not use drugs.,  _______________________________________________________________________  Allergies:  has No Known Allergies..  _______________________________________________________________________  Current Outpatient Medications   Medication Sig Dispense Refill    calcium carbonate (OS-FELICITA) 600 MG tablet Take 600 mg by mouth 2 (two) times a day with meals      cholecalciferol (VITAMIN D3) 1,000 units tablet Take 1,000 Units by mouth daily Patient reports taking 2,000 units daily      glucose blood test strip Use 1 each daily as needed Use as instructed      meloxicam (MOBIC) 15 mg tablet Take 1 tablet (15 mg total) by mouth daily as needed for moderate pain 30 tablet 0    Multiple Vitamin (MULTI-VITAMIN DAILY) TABS Take by mouth      psyllium (METAMUCIL) 58.6 % packet Take 1 packet by mouth as needed       "senna-docusate sodium (SENOKOT-S) 8.6-50 mg per tablet Take 2 tablets by mouth 2 (two) times a day      vitamin B-12 (VITAMIN B-12) 1,000 mcg tablet Take 1,000 mcg by mouth daily       No current facility-administered medications for this visit.     _______________________________________________________________________  Review of Systems   Constitutional:  Negative for chills and fever.   HENT:  Negative for congestion, ear pain, hearing loss, nosebleeds, sinus pain, sore throat and trouble swallowing.    Eyes:  Negative for discharge, redness and visual disturbance.   Respiratory:  Negative for cough, chest tightness and shortness of breath.    Cardiovascular:  Negative for chest pain and palpitations.   Gastrointestinal:  Negative for abdominal pain, blood in stool, constipation, diarrhea, nausea and vomiting.   Genitourinary:  Negative for dysuria, flank pain and hematuria.   Musculoskeletal:  Positive for back pain (Low back pain mainly on the right side.). Negative for arthralgias and neck pain.   Skin:  Negative for color change and rash.   Neurological:  Negative for dizziness, speech difficulty, weakness and headaches.   Hematological:  Does not bruise/bleed easily.   Psychiatric/Behavioral:  Negative for agitation and behavioral problems.            Objective:  Vitals:    05/13/24 1500   BP: 140/80   BP Location: Left arm   Patient Position: Sitting   Cuff Size: Standard   Pulse: 82   Resp: 18   Temp: (!) 97.2 °F (36.2 °C)   TempSrc: Temporal   SpO2: 97%   Weight: 79.4 kg (175 lb)   Height: 5' 3\" (1.6 m)   Her repeat blood pressure was 130/80.  Body mass index is 31 kg/m².     Physical Exam  Vitals and nursing note reviewed.   Constitutional:       General: She is not in acute distress.     Appearance: She is obese.   HENT:      Head: Normocephalic and atraumatic.      Nose: Nose normal.      Mouth/Throat:      Mouth: Mucous membranes are moist.   Eyes:      General: No scleral icterus.        Right eye: " No discharge.         Left eye: No discharge.      Extraocular Movements: Extraocular movements intact.      Conjunctiva/sclera: Conjunctivae normal.   Cardiovascular:      Rate and Rhythm: Normal rate and regular rhythm.      Pulses: Normal pulses.      Heart sounds: Normal heart sounds. No murmur heard.  Pulmonary:      Effort: Pulmonary effort is normal. No respiratory distress.      Breath sounds: Normal breath sounds. No wheezing, rhonchi or rales.   Abdominal:      General: Bowel sounds are normal.      Palpations: Abdomen is soft.      Tenderness: There is no abdominal tenderness.   Musculoskeletal:         General: Tenderness (Has some tenderness right lower lumbar area.) present. Normal range of motion.      Cervical back: Normal range of motion and neck supple. No muscular tenderness.      Right lower leg: No edema.      Left lower leg: No edema.   Skin:     General: Skin is warm.   Neurological:      General: No focal deficit present.      Mental Status: She is alert and oriented to person, place, and time.      Motor: No weakness.   Psychiatric:         Mood and Affect: Mood normal.         Behavior: Behavior normal.

## 2024-05-31 ENCOUNTER — EVALUATION (OUTPATIENT)
Dept: PHYSICAL THERAPY | Facility: CLINIC | Age: 74
End: 2024-05-31
Payer: COMMERCIAL

## 2024-05-31 DIAGNOSIS — M54.50 CHRONIC RIGHT-SIDED LOW BACK PAIN WITHOUT SCIATICA: Primary | ICD-10-CM

## 2024-05-31 DIAGNOSIS — G89.29 CHRONIC RIGHT-SIDED LOW BACK PAIN WITHOUT SCIATICA: Primary | ICD-10-CM

## 2024-05-31 DIAGNOSIS — M25.511 ACUTE PAIN OF RIGHT SHOULDER: ICD-10-CM

## 2024-05-31 DIAGNOSIS — M43.16 SPONDYLOLISTHESIS OF LUMBAR REGION: ICD-10-CM

## 2024-05-31 PROCEDURE — 97161 PT EVAL LOW COMPLEX 20 MIN: CPT | Performed by: PHYSICAL THERAPIST

## 2024-05-31 NOTE — PROGRESS NOTES
PT Evaluation    Today's date: 2024   Patient name: Cris Infante  : 1950  MRN: [id number]  Referring provider: Nicola Marquez*  Dx:   Encounter Diagnosis     ICD-10-CM    1. Chronic right-sided low back pain without sciatica  M54.50 Ambulatory Referral to Physical Therapy    G89.29 PT plan of care cert/re-cert      2. Spondylolisthesis of lumbar region  M43.16 Ambulatory Referral to Physical Therapy     PT plan of care cert/re-cert      3. Acute pain of right shoulder  M25.511 PT plan of care cert/re-cert              Subjective Evaluation     History of Present Illness    Patient presents as a R handed 72 yo F with c/o LBP referred to us from her PCP. She was attending PT here from January to 2024. She states she felt better c her leg strength after PT and now her pain is just in her back not down her leg. She also presents with R shoulder pain c difficulty lifting her arm up. She developed this R arm pain from insidious onset a few weeks ago. She has a hard time reaching behind her head. She has not had this pain before and denies numbness, tingling, nor any cardiac issues.  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 She feels pain in the feet also but this is on and off. She is more concerned about her R shoulder pain and would like to get treatment on this.        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      Pain  At best pain ratin  At worst pain ratin  Location: R lower back and R thigh, R lateral shoulder     Relieving factors: pain medications  Aggravating factors: walking a lot,   Shoulder: - lifting overhead, to head    Social Support  Lives at home with her  and has 1 flight of stairs at home s difficulty    FOTO Goals:  Vigorous activities, like running, lifting heavy objects, participating in strenuous sports Yes, limited a little No, not limited at all    Participating in recreation No, not limited at all     Walking more than a mile Yes, limited a little  Moderate activities, like moving a table, pushing a vacuum , bowling, or playing golf Yes, limited a little    Lifting or carrying groceries Yes, limited a little    Climbing several flights of stairs Yes, limited a little    Walking several blocks Yes, limited a little     Performing heavy activities around your home Quite a bit of difficulty    Performing your usual work, housework, or school activities A little bit of difficulty    Patient Goals  Patient goals for therapy: to be 100%    STGs (2-4 weeks)  1. Decrease pain by 20% to improve standing tolerance.  2. Improve lumbar ext ROM by 20%.   3. Improve hip strength by 1/3 grade.  4. Improve GH ROM by 20% in elevation.    5. Improve walking tolerance to 1 mile with no difficulty c assistance from  to perform community ambulation.   6.  Perform dressing and doing her hair with little difficulty.    LTGs (6-8 weeks)  1. Decrease pain by 60% in 6-8 weeks.  2. Improve walking tolerance to 1 mile independently to perform community ambulation. .   3. Improve lumbar ROM to full.   3. Improve hip strength to full .  4. Improve GH ROM to 40% in elevation to perform overhead lifting .    5. Perform dressing and doing her hair without difficulty.    Objective Measurements:    Observation: R shoulder elevated and R UT hypertonic     Gait: good stance time on each side  Squat: no pain and good depth  Reflexes:nt  Sensation:-  Thoracic ext: 40% limited and felt better for lower back but no effect on R shoulder pain/rom  Repeated lumbar ext in standing felt better in lower back  Flexion :WFL  EIL- difficulty to perform     Repeated C ext- no change  Repeated retraction 40% limited- repeated motions not performed    Slump: -SLR:  Anabel: - Faddir: -   Hip distraction: nt  Ely’s: Nt  C-distraction: -  MANDO:GH post/inf glide hypomobile   Thoracic spine not  assessed  Palpation: GH WFL  Shoulder A/PROM:  Flex L 140/140 R 90p/ 105p  Abd: L 135/140 R 80p/100p  ER: L 55 R 40p/50  IR : L T9 R T11/ 35   ER overhead L 60 R /55    GH strength: flexion: B 4   ER:L 5/5  R 4/5   IR: 4/5 B  Lumbar ROM L R Strength ankle L R   Flex  WFL  DF     Ext 30% limited  PF 5 5   Rot 40% limited 30% limited eversion     EIL Unable to perform  inversion     SB        Hip A/PROM WFL WFL HIP     Flex  /  / Flex 4 4   ER at 90   ER     IR at 90   IR     Abd   Abd     Ext   Ext 4- 4-           Knee A/PROM   Knee     Flex   Flex 4+ 4+   Ext   Ext 4+ 4+         Assessment:    Cris Infante is a pleasant 73 y.o. female who presents with primary movement impairment diagnosis of lumbar extension hypomobility and GH abduction hypomobility resulting in pathoanatomical symptoms of R LBP and R shoulder pain. She has deficits in decreased GH ROM, decreased GH/hip strength, and decreased lumbar ROM. This is limiting  her ability to reach overhead and to do her hair s pain.   The patient's greatest concern is improving to 100%.  No further referral appears necessary at this time based upon examination results.        Etiologic factors include insidious onset.    Negative prognostic indicators: chronic LBP and difficulty with multistep directions. Positive prognostic indicators: good attitude. She would need to have a signed POC for treatment for her R shoulder since she doesn't have a script. She would benefit from PT to help her RUE strength, ROM, and improved lifting overhead. Please contact me if you have any further questions or recommendations. Thank you very much for the kind referral.         Plan  Patient would benefit from:Skilled physical therapy and skilled PT to help her shoulder pain. She would be treated as a direct access patient and if you agree with the current POC please sign.    Planned therapy interventions: manual therapy, neuromuscular re-education, stretching, strengthening, therapeutic  activities, therapeutic exercise, patient education, home exercise program, and activity modification.    Frequency: 2x week  Duration in weeks: 8  Treatment plan discussed with: patient             Goals: Patient's goal is  to improve 100%    Precautions: learns best with demonstration  Dx:R GH abd hypomobility and lumbar ext hypomobility      Daily Treatment Diary     Manuals 5/31/2024        GH PROM and multidirectional mobs gr 3-4         Assess thoracic spine                           Ther Ex         Wand flexion 10x        pulleys         Wall slides         ER wand         Wand haircut         Lumbar ext against table 10x                                   Neuro Re-ed         Trial repeated c-retraction         Scap retraction         Thoracic ext over chair 10x :05                          Ther Activity         Reaching cabinet         stairs                  Gait Training         treadmill                  Modalities

## 2024-06-03 ENCOUNTER — OFFICE VISIT (OUTPATIENT)
Dept: PHYSICAL THERAPY | Facility: CLINIC | Age: 74
End: 2024-06-03
Payer: COMMERCIAL

## 2024-06-03 DIAGNOSIS — G89.29 CHRONIC RIGHT-SIDED LOW BACK PAIN WITHOUT SCIATICA: Primary | ICD-10-CM

## 2024-06-03 DIAGNOSIS — M43.16 SPONDYLOLISTHESIS OF LUMBAR REGION: ICD-10-CM

## 2024-06-03 DIAGNOSIS — M54.50 CHRONIC RIGHT-SIDED LOW BACK PAIN WITHOUT SCIATICA: Primary | ICD-10-CM

## 2024-06-03 DIAGNOSIS — M25.511 ACUTE PAIN OF RIGHT SHOULDER: ICD-10-CM

## 2024-06-03 PROCEDURE — 97110 THERAPEUTIC EXERCISES: CPT | Performed by: PHYSICAL THERAPIST

## 2024-06-03 PROCEDURE — 97112 NEUROMUSCULAR REEDUCATION: CPT | Performed by: PHYSICAL THERAPIST

## 2024-06-03 PROCEDURE — 97140 MANUAL THERAPY 1/> REGIONS: CPT | Performed by: PHYSICAL THERAPIST

## 2024-06-03 NOTE — PROGRESS NOTES
Daily Note     Today's date: 6/3/2024  Patient name: Cris Infante  : 1950  MRN: [id number]  Referring provider: Nicola Marquez*  Dx:   Encounter Diagnosis     ICD-10-CM    1. Chronic right-sided low back pain without sciatica  M54.50     G89.29       2. Acute pain of right shoulder  M25.511       3. Spondylolisthesis of lumbar region  M43.16           Start Time: 1130  Stop Time: 1215  Total time in clinic (min): 45 minutes    Subjective: She states she is feeling okay today.       Objective: See treatment diary below    GH ER 3+p  Assessment: Tolerated treatment fair. Her PROM was much better today and was nearly full. She still continues c ER weakness and presented today as if she had a torn rotator cuff but does not have any hx of trauma. Trialed repeated C-ROM but she had difficulty c instructions of this. On the treadmill it was a bit unsafe c coordination so we will continue c her home daily walking program. Patient would benefit from continued PT      Plan: Continue per plan of care.      Goals: Patient's goal is  to improve 100%    Precautions: learns best with demonstration, direct access- shoulder  Dx:R (DA) GH abd hypomobility and lumbar ext hypomobility      Daily Treatment Diary     Manuals 2024 6/3       GH PROM and multidirectional mobs gr 3-4  8'       Assess thoracic spine  2'                         Ther Ex         Wand flexion 10x 10x         pulleys  4'       Wall slides  20x       ER wand  20x       Wand haircut  nv       Lumbar ext against table 10x 10x2                                  Neuro Re-ed         Trial repeated c-retraction  Unable to perform due to form       Scap retraction  20x       Thoracic ext over chair 10x :05 10x :05       ER iso         C-ext  10x2       Ther Activity         Reaching cabinet         stairs                  Gait Training         treadmill  1'                 Modalities

## 2024-06-06 ENCOUNTER — OFFICE VISIT (OUTPATIENT)
Dept: PHYSICAL THERAPY | Facility: CLINIC | Age: 74
End: 2024-06-06
Payer: COMMERCIAL

## 2024-06-06 DIAGNOSIS — M54.50 CHRONIC RIGHT-SIDED LOW BACK PAIN WITHOUT SCIATICA: Primary | ICD-10-CM

## 2024-06-06 DIAGNOSIS — M43.16 SPONDYLOLISTHESIS OF LUMBAR REGION: ICD-10-CM

## 2024-06-06 DIAGNOSIS — G89.29 CHRONIC RIGHT-SIDED LOW BACK PAIN WITHOUT SCIATICA: Primary | ICD-10-CM

## 2024-06-06 DIAGNOSIS — M25.511 ACUTE PAIN OF RIGHT SHOULDER: ICD-10-CM

## 2024-06-06 PROCEDURE — 97140 MANUAL THERAPY 1/> REGIONS: CPT | Performed by: PHYSICAL THERAPIST

## 2024-06-06 PROCEDURE — 97110 THERAPEUTIC EXERCISES: CPT | Performed by: PHYSICAL THERAPIST

## 2024-06-06 NOTE — PROGRESS NOTES
Daily Note     Today's date: 2024  Patient name: Cris Infante  : 1950  MRN: [id number]  Referring provider: Nicola Marquez*  Dx:   Encounter Diagnosis     ICD-10-CM    1. Chronic right-sided low back pain without sciatica  M54.50     G89.29       2. Acute pain of right shoulder  M25.511       3. Spondylolisthesis of lumbar region  M43.16           Start Time: 1009  Stop Time: 1042  Total time in clinic (min): 33 minutes    Subjective: She states she had no increasing soreness after last visit but her arm still does feel the same.       Objective: See treatment diary below    GH ER 3+p  Assessment: Tolerated treatment fair. She did feel a little better c doing supine flexion overhead.  Added in wand stretch c ER and this seemed to help her AROM to about 110. Patient would benefit from continued PT      Plan: Continue per plan of care.      Goals: Patient's goal is  to improve 100%    Precautions: needs towel on pillow due to temperature sensitivity, learns best with demonstration, direct access- shoulder  Dx:R (DA) GH abd hypomobility and lumbar ext hypomobility      Daily Treatment Diary     Manuals 2024 6/3 6/6      GH PROM and multidirectional mobs gr 3-4  8' 8'      Assess thoracic spine  2'                         Ther Ex         Wand flexion 10x 10x No wand today 15x        pulleys  4' 3'      Wall slides  20x       ER wand  20x 20x      Wand haircut  nv 20x      Lumbar ext against table 10x 10x2                                  Neuro Re-ed         Trial repeated c-retraction  Unable to perform due to form       Scap retraction  20x       Thoracic ext over chair 10x :05 10x :05       ER iso   10x :05      C-ext  10x2 2x10      Ther Activity         Reaching cabinet         stairs                  Gait Training         treadmill  1'                 Modalities         MHP L shoulder   10'

## 2024-06-12 ENCOUNTER — OFFICE VISIT (OUTPATIENT)
Dept: PHYSICAL THERAPY | Facility: CLINIC | Age: 74
End: 2024-06-12
Payer: COMMERCIAL

## 2024-06-12 DIAGNOSIS — M25.511 ACUTE PAIN OF RIGHT SHOULDER: ICD-10-CM

## 2024-06-12 DIAGNOSIS — M43.16 SPONDYLOLISTHESIS OF LUMBAR REGION: ICD-10-CM

## 2024-06-12 DIAGNOSIS — M54.50 CHRONIC RIGHT-SIDED LOW BACK PAIN WITHOUT SCIATICA: Primary | ICD-10-CM

## 2024-06-12 DIAGNOSIS — G89.29 CHRONIC RIGHT-SIDED LOW BACK PAIN WITHOUT SCIATICA: Primary | ICD-10-CM

## 2024-06-12 PROCEDURE — 97140 MANUAL THERAPY 1/> REGIONS: CPT | Performed by: PHYSICAL THERAPIST

## 2024-06-12 PROCEDURE — 97110 THERAPEUTIC EXERCISES: CPT | Performed by: PHYSICAL THERAPIST

## 2024-06-12 NOTE — PROGRESS NOTES
Daily Note     Today's date: 2024  Patient name: Cris Infante  : 1950  MRN: [id number]  Referring provider: Nicola Marquez*  Dx:   Encounter Diagnosis     ICD-10-CM    1. Chronic right-sided low back pain without sciatica  M54.50     G89.29       2. Acute pain of right shoulder  M25.511       3. Spondylolisthesis of lumbar region  M43.16           Start Time: 1030  Stop Time: 1105  Total time in clinic (min): 35 minutes    Subjective: She states she had no increasing soreness after last visit but her arm still does feel the same.       Objective: See treatment diary below    GH ER 3+p  Assessment: Tolerated treatment fair. She did have improved ROM after PROM stretching. She does not feel any deficits c her LBP but still has weakness in R rotator cuff.  Patient would benefit from continued PT      Plan: Continue per plan of care.      Goals: Patient's goal is  to improve 100%    Precautions: needs towel on pillow due to temperature sensitivity, learns best with demonstration, direct access- shoulder  Dx:R (DA) GH abd hypomobility and lumbar ext hypomobility      Daily Treatment Diary     Manuals 2024 6/3 6/6 6/12     GH PROM and multidirectional mobs gr 3-4  8' 8' 8'     Assess thoracic spine  2'                         Ther Ex         Wand flexion 10x 10x No wand today 15x        pulleys  4' 3' 3'     Wall slides  20x  15x     ER wand  20x 20x 20x     Wand haircut  nv 20x 20x     Lumbar ext against table 10x 10x2       S/l ER    20x                       Neuro Re-ed         Trial repeated c-retraction  Unable to perform due to form       Scap retraction  20x       Thoracic ext over chair 10x :05 10x :05       ER iso   10x :05 10x :05     C-ext  10x2 2x10 20x     Ther Activity         Reaching cabinet         stairs                  Gait Training         treadmill  1'                 Modalities         MHP R shoulder   10' 10'

## 2024-06-13 ENCOUNTER — OFFICE VISIT (OUTPATIENT)
Dept: GASTROENTEROLOGY | Facility: CLINIC | Age: 74
End: 2024-06-13
Payer: COMMERCIAL

## 2024-06-13 VITALS
SYSTOLIC BLOOD PRESSURE: 124 MMHG | BODY MASS INDEX: 29.69 KG/M2 | HEART RATE: 73 BPM | HEIGHT: 65 IN | DIASTOLIC BLOOD PRESSURE: 80 MMHG | WEIGHT: 178.2 LBS

## 2024-06-13 DIAGNOSIS — K59.00 CONSTIPATION, UNSPECIFIED CONSTIPATION TYPE: ICD-10-CM

## 2024-06-13 PROCEDURE — 99213 OFFICE O/P EST LOW 20 MIN: CPT | Performed by: NURSE PRACTITIONER

## 2024-06-13 NOTE — PROGRESS NOTES
[facility] [place] - Outpatient Follow-up Note  Cris Infante 73 y.o. female MRN: [id number]  Encounter: [id number]          ASSESSMENT AND PLAN:      1. Constipation, unspecified constipation type  Currently taking 3 teaspoons Metamucil daily, 2 tablets Senokot twice daily, and smooth move tea with on average 1 bowel movement per week.  Usually goes several days with no bowel movement followed by a day with several bowel movements in a row.  CBC, CMP, TSH within the last year fairly unremarkable.    -Continue adequate hydration, walking with   -Continue the Metamucil 3 teaspoons daily  -Start Miralax 17 g daily, titrate to effect. Drop the Senokot to 2 pills daily at night  -Once you've been on the Miralax for a week, then start lowering the dose of Senokot to 1 pill a night and then stop and only take as needed if no BM in 2 days  -Follow-up in 4 to 6 months    2. Colon cancer screening  Has undergone several prior colonoscopies with no polyps, as well as negative Cologuard within the past few years.  No clear indication for screening colonoscopy given her age and negative findings   ______________________________________________________________________    SUBJECTIVE:  Cris Infante is a 73 y.o. female presenting with  for follow-up due to chronic constipation.  She was last seen by Dr. Caldera in January and was advised to uptitrate her Metamucil with hopes of backing off on her Senokot. She's currently taking 3 tsp of Metamucil daily in the afternoon and taking 2 senokot BID along with sennokot tea and only moving her bowels once a week on average with large quantity of stool. Drinks water, walks.  Gets back pain when constipated. Denies any weight loss, rectal bleeding, abdominal pain, nausea/vomiting.      REVIEW OF SYSTEMS IS OTHERWISE NEGATIVE.      Historical Information   Past Medical History:   Diagnosis Date    Abrasion of knee, bilateral 08/31/2022    Allergic rhinitis  2021    Bilateral hearing loss 2022    Bilateral impacted cerumen 2022    BMI 31.0-31.9,adult 2023    BMI 31.0-31.9,adult 2024    BMI 32.0-32.9,adult 2022    BMI 33.0-33.9,adult 2021    Cataract, bilateral     Chronic bilateral low back pain without sciatica 2022    Chronic right-sided low back pain with right-sided sciatica 2023    Constipation     Constipation 2021    COVID-19 2023    Deficiency anemia 2024    Elevated glucose level     Fatigue     History of anemia     Hypertriglyceridemia     Hypertriglyceridemia 2021    Low vitamin B12 level 2024    Medicare annual wellness visit, subsequent 2021    Medicare annual wellness visit, subsequent 2022    Medicare annual wellness visit, subsequent 2023    Monoclonal gammopathy 2024    Obesity     Osteopenia     Osteopenia of multiple sites 2022    Pain in both knees 2022    Pain of left heel 2021    Post-menopausal     Prediabetes 2023    Rash     Spondylolisthesis of lumbar region 2024    Type 2 diabetes mellitus without complication, without long-term current use of insulin ([facility]ly [facility]) 2024    Vitamin D deficiency     Vitamin D deficiency 2021     Past Surgical History:   Procedure Laterality Date    CATARACT EXTRACTION, BILATERAL  2018     SECTION      COLONOSCOPY  2014    Advise f/u colonoscopy; see by [facility] 2020 at office; she is supposed to have a colonoscopy, but due to Covid 19, she wants to hold for now     DXA PROCEDURE (HISTORICAL)  2020    MAMMO (HISTORICAL)  2020     Social History   Social History     Substance and Sexual Activity   Alcohol Use Yes    Alcohol/week: 1.0 standard drink of alcohol    Types: 1 Cans of beer per week    Comment: occasional     Social History     Substance and Sexual Activity   Drug Use No     Social History     Tobacco Use   Smoking Status Never  "  Smokeless Tobacco Never     Family History   Problem Relation Age of Onset    Diabetes Mother     Other Mother         Memory impairment, at old age    No Known Problems Father     Diabetes Sister     Diabetes Sister     No Known Problems Sister     No Known Problems Sister     No Known Problems Daughter     No Known Problems Daughter     No Known Problems Daughter     No Known Problems Daughter     No Known Problems Daughter     No Known Problems Maternal Grandmother     No Known Problems Maternal Grandfather     No Known Problems Paternal Grandmother     No Known Problems Paternal Grandfather     Diabetes Brother     Diabetes Brother     No Known Problems Brother     No Known Problems Brother     No Known Problems Son     No Known Problems Maternal Aunt     No Known Problems Paternal Aunt        Meds/Allergies       Current Outpatient Medications:     calcium carbonate (OS-FELICITA) 600 MG tablet    cholecalciferol (VITAMIN D3) 1,000 units tablet    glucose blood test strip    meloxicam (MOBIC) 15 mg tablet    Multiple Vitamin (MULTI-VITAMIN DAILY) TABS    psyllium (METAMUCIL) 58.6 % packet    senna-docusate sodium (SENOKOT-S) 8.6-50 mg per tablet    vitamin B-12 (VITAMIN B-12) 1,000 mcg tablet    No Known Allergies        Objective     Blood pressure 124/80, pulse 73, height 5' 5\" (1.651 m), weight 80.8 kg (178 lb 3.2 oz). Body mass index is 29.65 kg/m².      PHYSICAL EXAM:      General Appearance:   Alert, cooperative, no distress   HEENT:   Normocephalic, atraumatic, anicteric.     Neck:  Supple, symmetrical, trachea midline   Lungs:   Clear to auscultation bilaterally; no rales, rhonchi or wheezing; respirations unlabored    Heart::   Regular rate and rhythm; no murmur.   Abdomen:   Soft, non-tender, non-distended; normal bowel sounds; no masses, no organomegaly    Genitalia:   Deferred    Rectal:   Deferred    Extremities:  No cyanosis, clubbing or edema    Skin:  No jaundice, rashes, or lesions    Lymph nodes:  " No palpable cervical lymphadenopathy        Lab Results:   No visits with results within 1 Day(s) from this visit.   Latest known visit with results is:   Appointment on 03/19/2024   Component Date Value    WBC 03/19/2024 5.56     RBC 03/19/2024 4.62     Hemoglobin 03/19/2024 12.0     Hematocrit 03/19/2024 38.4     MCV 03/19/2024 83     MCH 03/19/2024 26.0 (L)     MCHC 03/19/2024 31.3 (L)     RDW 03/19/2024 14.8     MPV 03/19/2024 10.1     Platelets 03/19/2024 249     nRBC 03/19/2024 0     Segmented % 03/19/2024 48     Immature Grans % 03/19/2024 0     Lymphocytes % 03/19/2024 41     Monocytes % 03/19/2024 10     Eosinophils Relative 03/19/2024 0     Basophils Relative 03/19/2024 1     Absolute Neutrophils 03/19/2024 2.65     Absolute Immature Grans 03/19/2024 0.00     Absolute Lymphocytes 03/19/2024 2.30     Absolute Monocytes 03/19/2024 0.57     Eosinophils Absolute 03/19/2024 0.01     Basophils Absolute 03/19/2024 0.03     Folate 03/19/2024 >22.3     Vitamin B-12 03/19/2024 292     A/G Ratio 03/19/2024 1.36     Albumin Electrophoresis 03/19/2024 57.6     Albumin CONC 03/19/2024 3.97     Alpha 1 03/19/2024 3.8     ALPHA 1 CONC 03/19/2024 0.26     Alpha 2 03/19/2024 9.6     ALPHA 2 CONC 03/19/2024 0.66     Beta-1 03/19/2024 5.3     BETA 1 CONC 03/19/2024 0.37     Beta-2 03/19/2024 5.4     BETA 2 CONC 03/19/2024 0.37     Gamma Globulin 03/19/2024 18.3     GAMMA CONC 03/19/2024 1.26     Total Protein 03/19/2024 6.9     SPEP Interpretation 03/19/2024 See Comment     Iron Saturation 03/19/2024 15     TIBC 03/19/2024 292     Iron 03/19/2024 44 (L)     UIBC 03/19/2024 248     Ferritin 03/19/2024 52     Immunofixation Interpret* 03/19/2024 See Comment          Radiology Results:   No results found.

## 2024-06-13 NOTE — PATIENT INSTRUCTIONS
-Continue the Metamucil  -Start Miralax per instructions below. Drop the Senokot to 2 pills daily at night  -Once you've been on the Miralax for a week, then start lowering the dose of Senokot to 1 pill a night and then stop and only take as needed if no BM in 2 days      You have been prescribed miralax (polyethylene glycol) for treatment of your CONSTIPATION.    Start 1 capful daily. Take this dose x 3 days. If your symptoms are improved, great! Continue this dose daily.    If you have diarrhea (stools are loose, associated with accidents, or urgency) with this dose, cut down to 1/2 capful daily. Continue to titrate down until you find the dose for you. This might be 1 tbsp daily. Wait 3 days before making a change.    If you have continued constipation with 1 capful daily, you may need a higher dose. Start with 1.5 capfuls daily and titrate upward. Eg might need 1 capful twice daily. There is no maximum dose, whatever works for you. Again, wait 3 days before making a change.

## 2024-06-14 ENCOUNTER — OFFICE VISIT (OUTPATIENT)
Dept: PHYSICAL THERAPY | Facility: CLINIC | Age: 74
End: 2024-06-14
Payer: COMMERCIAL

## 2024-06-14 DIAGNOSIS — M25.511 ACUTE PAIN OF RIGHT SHOULDER: ICD-10-CM

## 2024-06-14 DIAGNOSIS — M43.16 SPONDYLOLISTHESIS OF LUMBAR REGION: ICD-10-CM

## 2024-06-14 DIAGNOSIS — G89.29 CHRONIC RIGHT-SIDED LOW BACK PAIN WITHOUT SCIATICA: Primary | ICD-10-CM

## 2024-06-14 DIAGNOSIS — M54.50 CHRONIC RIGHT-SIDED LOW BACK PAIN WITHOUT SCIATICA: Primary | ICD-10-CM

## 2024-06-14 PROCEDURE — 97112 NEUROMUSCULAR REEDUCATION: CPT

## 2024-06-14 PROCEDURE — 97110 THERAPEUTIC EXERCISES: CPT

## 2024-06-14 PROCEDURE — 97140 MANUAL THERAPY 1/> REGIONS: CPT

## 2024-06-14 NOTE — PROGRESS NOTES
Daily Note     Today's date: 2024  Patient name: Cris Infante  : 1950  MRN: [id number]  Referring provider: Nicola Marquez*  Dx:   Encounter Diagnosis     ICD-10-CM    1. Chronic right-sided low back pain without sciatica  M54.50     G89.29       2. Acute pain of right shoulder  M25.511       3. Spondylolisthesis of lumbar region  M43.16           Start Time: 1030  Stop Time: 1108  Total time in clinic (min): 38 minutes    Subjective: Patient reports no new complaints or major changes since last session and continues to note ongoing R shoulder pain with any kind of lifting or reaching motion    Objective: See treatment diary below    Assessment: Tolerated treatment fair. Patient demonstrated an appropriate level of challenge with all exercises performed today and did not experience any adverse response throughout session. Tightness noted in all planes during PROM today, with passive shoulder flexion and IR having the most restrictions, but motion continued to improve post PROM. Patient required verbal cues for proper form and recall of exercises today with fair carryover noted. No increases in symptoms noted at the conclusion of session. Patient would benefit from continued PT to reduce symptoms, restore R shoulder mobility, stability, strength, and to maximize overall functional ability.      Plan: Continue per plan of care.      Goals: Patient's goal is to improve 100%    Precautions: needs towel on pillow due to temperature sensitivity, learns best with demonstration, direct access- shoulder  Dx:R (DA) GH abd hypomobility and lumbar ext hypomobility      Daily Treatment Diary     Manuals 2024 6/3 6/6 6/12 6/14    GH PROM and multidirectional mobs gr 3-4  8' 8' 8' 8'    Assess thoracic spine  2'                         Ther Ex         Wand flexion 10x 10x No wand today 15x          pulleys  4' 3' 3' 3'    Wall slides  20x  15x 20x    ER wand  20x 20x 20x 20x    Wand haircut  nv 20x 20x  20x    Lumbar ext against table 10x 10x2       S/l ER    20x 20x                      Neuro Re-ed         Trial repeated c-retraction  Unable to perform due to form       Scap retraction  20x       Thoracic ext over chair 10x :05 10x :05       ER iso   10x :05 10x :05 10x :05    C-ext  10x2 2x10 20x 20x    Ther Activity         Reaching cabinet         stairs                  Gait Training         treadmill  1'                 Modalities         MHP R shoulder   10' 10' 10'

## 2024-06-18 ENCOUNTER — OFFICE VISIT (OUTPATIENT)
Dept: PHYSICAL THERAPY | Facility: CLINIC | Age: 74
End: 2024-06-18
Payer: COMMERCIAL

## 2024-06-18 DIAGNOSIS — M25.511 ACUTE PAIN OF RIGHT SHOULDER: ICD-10-CM

## 2024-06-18 DIAGNOSIS — G89.29 CHRONIC RIGHT-SIDED LOW BACK PAIN WITHOUT SCIATICA: Primary | ICD-10-CM

## 2024-06-18 DIAGNOSIS — M54.50 CHRONIC RIGHT-SIDED LOW BACK PAIN WITHOUT SCIATICA: Primary | ICD-10-CM

## 2024-06-18 DIAGNOSIS — M43.16 SPONDYLOLISTHESIS OF LUMBAR REGION: ICD-10-CM

## 2024-06-18 PROCEDURE — 97110 THERAPEUTIC EXERCISES: CPT

## 2024-06-18 PROCEDURE — 97140 MANUAL THERAPY 1/> REGIONS: CPT

## 2024-06-18 NOTE — PROGRESS NOTES
Daily Note     Today's date: 2024  Patient name: Cris Infante  : 1950  MRN: [id number]  Referring provider: Nicola Marquez*  Dx:   Encounter Diagnosis     ICD-10-CM    1. Chronic right-sided low back pain without sciatica  M54.50     G89.29       2. Acute pain of right shoulder  M25.511       3. Spondylolisthesis of lumbar region  M43.16                      Subjective: Patient reports she does not have pain unless she tries to reach up.       Objective: See treatment diary below      Assessment: Tolerated treatment well.  Completed charted program with minimal discomfort. She does have pain and weakness with flexion above 90* Patient demonstrated fatigue post treatment and would benefit from continued PT      Plan: Progress treatment as tolerated.       Goals: Patient's goal is to improve 100%    Precautions: needs towel on pillow due to temperature sensitivity, learns best with demonstration, direct access- shoulder  Dx:R (DA) GH abd hypomobility and lumbar ext hypomobility      Daily Treatment Diary     Manuals 2024 6/3 6/6 6/12 6/14 6/18   GH PROM and multidirectional mobs gr 3-4  8' 8' 8' 8' PROM only 8'   Assess thoracic spine  2'                         Ther Ex         Wand flexion 10x 10x No wand today 15x          pulleys  4' 3' 3' 3' 3'   Wall slides  20x  15x 20x 20x   ER wand  20x 20x 20x 20x 20x   Wand haircut  nv 20x 20x 20x 20x   Lumbar ext against table 10x 10x2       S/l ER    20x 20x 20x   Shoulder flexion      Supine AA 10x to 90*            Neuro Re-ed         Trial repeated c-retraction  Unable to perform due to form       Scap retraction  20x       Thoracic ext over chair 10x :05 10x :05       ER iso   10x :05 10x :05 10x :05 10x :05   C-ext  10x2 2x10 20x 20x 20x    Ther Activity         Reaching cabinet         stairs                  Gait Training         treadmill  1'                 Modalities         MHP R shoulder   10' 10' 10' 10'

## 2024-06-20 ENCOUNTER — APPOINTMENT (OUTPATIENT)
Dept: PHYSICAL THERAPY | Facility: CLINIC | Age: 74
End: 2024-06-20
Payer: COMMERCIAL

## 2024-06-21 ENCOUNTER — OFFICE VISIT (OUTPATIENT)
Dept: PHYSICAL THERAPY | Facility: CLINIC | Age: 74
End: 2024-06-21
Payer: COMMERCIAL

## 2024-06-21 DIAGNOSIS — M43.16 SPONDYLOLISTHESIS OF LUMBAR REGION: ICD-10-CM

## 2024-06-21 DIAGNOSIS — G89.29 CHRONIC RIGHT-SIDED LOW BACK PAIN WITHOUT SCIATICA: Primary | ICD-10-CM

## 2024-06-21 DIAGNOSIS — M54.50 CHRONIC RIGHT-SIDED LOW BACK PAIN WITHOUT SCIATICA: Primary | ICD-10-CM

## 2024-06-21 DIAGNOSIS — M25.511 ACUTE PAIN OF RIGHT SHOULDER: ICD-10-CM

## 2024-06-21 PROCEDURE — 97140 MANUAL THERAPY 1/> REGIONS: CPT

## 2024-06-21 NOTE — PROGRESS NOTES
Daily Note     Today's date: 2024  Patient name: Cris Infante  : 1950  MRN: [id number]  Referring provider: Nicola Marquez*  Dx:   Encounter Diagnosis     ICD-10-CM    1. Chronic right-sided low back pain without sciatica  M54.50     G89.29       2. Spondylolisthesis of lumbar region  M43.16       3. Acute pain of right shoulder  M25.511           Start Time: 1150  Stop Time: 1237  Total time in clinic (min): 47 minutes    Subjective: Patient reports that her greatest complaint is her limitation with R OH movements.       Objective: See treatment diary below      Assessment: Tolerated treatment well.  R UE PROM > AROM.  Patient would benefit from continued PT      Plan: Progress treatment as tolerated.       Goals: Patient's goal is to improve 100%    Precautions: needs towel on pillow due to temperature sensitivity, learns best with demonstration, direct access- shoulder  Dx:R (DA) GH abd hypomobility and lumbar ext hypomobility      Daily Treatment Diary     Manuals    GH PROM and multidirectional mobs gr 3-4 PROM LNK  8' 8' 8' PROM only 8'   Assess thoracic spine                           Ther Ex         Wand flexion   No wand today 15x          pulleys 3 min   3' 3' 3' 3'   Wall slides 20x   15x 20x 20x   ER wand 20x  20x 20x 20x 20x   Wand haircut 20x  20x 20x 20x 20x   Lumbar ext against table         S/l ER 20x   20x 20x 20x   Shoulder flexion 2x10 to 90 deg     Supine AA 10x to 90*   Supine wand flexion  20x        Neuro Re-ed         Trial repeated c-retraction 20        Scap retraction         Thoracic ext over chair         ER iso   10x :05 10x :05 10x :05 10x :05   C-ext   2x10 20x 20x 20x    Ther Activity         Reaching cabinet         stairs                  Gait Training         treadmill                  Modalities         MHP R shoulder 10'  10' 10' 10' 10'

## 2024-06-25 ENCOUNTER — OFFICE VISIT (OUTPATIENT)
Dept: PHYSICAL THERAPY | Facility: CLINIC | Age: 74
End: 2024-06-25
Payer: COMMERCIAL

## 2024-06-25 DIAGNOSIS — M43.16 SPONDYLOLISTHESIS OF LUMBAR REGION: ICD-10-CM

## 2024-06-25 DIAGNOSIS — M54.50 CHRONIC RIGHT-SIDED LOW BACK PAIN WITHOUT SCIATICA: Primary | ICD-10-CM

## 2024-06-25 DIAGNOSIS — G89.29 CHRONIC RIGHT-SIDED LOW BACK PAIN WITHOUT SCIATICA: Primary | ICD-10-CM

## 2024-06-25 DIAGNOSIS — M25.511 ACUTE PAIN OF RIGHT SHOULDER: ICD-10-CM

## 2024-06-25 PROCEDURE — 97110 THERAPEUTIC EXERCISES: CPT

## 2024-06-25 NOTE — PROGRESS NOTES
"Daily Note     Today's date: 2024  Patient name: Cris Infante  : 1950  MRN: [id number]  Referring provider: Nicola Marquez*  Dx:   Encounter Diagnosis     ICD-10-CM    1. Chronic right-sided low back pain without sciatica  M54.50     G89.29       2. Spondylolisthesis of lumbar region  M43.16       3. Acute pain of right shoulder  M25.511           Start Time: 1035  Stop Time: 1115  Total time in clinic (min): 40 minutes    Subjective: Patient reports that she has continued right shoulder soreness and discomfort.       Objective: See treatment diary below      Assessment: Added scapular retractions as indicated below for postural strengthening. Patient required moderate to max VC/TC and visual demonstration for posturing throughout session. However, she appeared to tolerate treatment well. Patient was guarded with manuals which limited ability to get true PROM and stretch response. Patient demonstrated fatigue post treatment and would benefit from continued PT      Plan: Continue per plan of care.  Progress treatment as tolerated.       Goals: Patient's goal is to improve 100%    Precautions: needs towel on pillow due to temperature sensitivity, learns best with demonstration, direct access- shoulder  Dx:R (DA) GH abd hypomobility and lumbar ext hypomobility      Daily Treatment Diary     Manuals    GH PROM and multidirectional mobs gr 3-4 PROM LNK PROM JL  8' 8' PROM only 8'   Assess thoracic spine                           Ther Ex         Wand flexion             pulleys 3 min  3 min  3' 3' 3'   Wall slides 20x 20x  15x 20x 20x   ER wand 20x   20x 20x 20x   Wand haircut 20x 20x  20x 20x 20x   Lumbar ext against table         S/l ER 20x 20x  20x 20x 20x   Shoulder flexion 2x10 to 90 deg 2x10 to 90 deg    Supine AA 10x to 90*   Supine wand flexion  20x 20x       Scap Retract  3\" x10       Neuro Re-ed         Trial repeated c-retraction 20 1x10 TC       Scap retraction "         Thoracic ext over chair         ER iso    10x :05 10x :05 10x :05   C-ext    20x 20x 20x    Ther Activity         Reaching cabinet         stairs                  Gait Training         treadmill                  Modalities         MHP R shoulder 10' NT  10' 10' 10'

## 2024-06-27 ENCOUNTER — EVALUATION (OUTPATIENT)
Dept: PHYSICAL THERAPY | Facility: CLINIC | Age: 74
End: 2024-06-27
Payer: COMMERCIAL

## 2024-06-27 DIAGNOSIS — M43.16 SPONDYLOLISTHESIS OF LUMBAR REGION: ICD-10-CM

## 2024-06-27 DIAGNOSIS — G89.29 CHRONIC RIGHT-SIDED LOW BACK PAIN WITHOUT SCIATICA: Primary | ICD-10-CM

## 2024-06-27 DIAGNOSIS — M54.50 CHRONIC RIGHT-SIDED LOW BACK PAIN WITHOUT SCIATICA: Primary | ICD-10-CM

## 2024-06-27 DIAGNOSIS — M25.511 ACUTE PAIN OF RIGHT SHOULDER: ICD-10-CM

## 2024-06-27 PROCEDURE — 97110 THERAPEUTIC EXERCISES: CPT | Performed by: PHYSICAL THERAPIST

## 2024-06-27 PROCEDURE — 97140 MANUAL THERAPY 1/> REGIONS: CPT | Performed by: PHYSICAL THERAPIST

## 2024-06-27 NOTE — PROGRESS NOTES
PT Evaluation    Today's date: 2024   Patient name: Cris Infante  : 1950  MRN: [id number]  Referring provider: Nicola Marquez*  Dx:   Encounter Diagnosis     ICD-10-CM    1. Chronic right-sided low back pain without sciatica  M54.50     G89.29       2. Spondylolisthesis of lumbar region  M43.16       3. Acute pain of right shoulder  M25.511                 Subjective Evaluation     History of Present Illness    Pt presents today stating that her shoulder is similar to what it was about a month ago.  Pt reports that her pain levels are about the same about 6-7/10 at worst.  Difficult reaching and grabbing.  Pt reports that her back is still present it is intermittent however she states that her symptoms are still about the same at worst.  She has no symptoms down her legs.  Pt reports that she is continuing to move, but isn't finding any relieve at this time.  Pt follows up with her family physician on 24.          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      Pain  At best pain ratin  At worst pain ratin  Location: R lower back and R thigh, R lateral shoulder     Relieving factors: pain medications  Aggravating factors: walking a lot,   Shoulder: - lifting overhead, to head    Social Support  Lives at home with her  and has 1 flight of stairs at home s difficulty    FOTO Goals:  Vigorous activities, like running, lifting heavy objects, participating in strenuous sports Yes, limited a little No, not limited at all   Participating in recreation No, not limited at all     Walking more than a mile Yes, limited a little  Moderate activities, like moving a table, pushing a vacuum , bowling, or playing golf Yes, limited a little    Lifting or carrying groceries Yes, limited a little    Climbing several flights of stairs Yes, limited a little    Walking several blocks Yes, limited a little      Performing heavy activities around your home Quite a bit of difficulty    Performing your usual work, housework, or school activities A little bit of difficulty    Patient Goals  Patient goals for therapy: to be 100%    STGs (2-4 weeks)  1. Decrease pain by 20% to improve standing tolerance.  2. Improve lumbar ext ROM by 20%.   3. Improve hip strength by 1/3 grade.  4. Improve GH ROM by 20% in elevation.    5. Improve walking tolerance to 1 mile with no difficulty c assistance from  to perform community ambulation.   6.  Perform dressing and doing her hair with little difficulty.    LTGs (6-8 weeks)  1. Decrease pain by 60% in 6-8 weeks.  2. Improve walking tolerance to 1 mile independently to perform community ambulation. .   3. Improve lumbar ROM to full.   3. Improve hip strength to full .  4. Improve GH ROM to 40% in elevation to perform overhead lifting .    5. Perform dressing and doing her hair without difficulty.    Objective Measurements:    Observation: R shoulder elevated and R UT hypertonic     Gait: good stance time on each side  Squat: no pain and good depth  Reflexes:nt  Sensation:-  Thoracic ext: 40% limited and felt better for lower back but no effect on R shoulder pain/rom  Repeated lumbar ext in standing felt better in lower back  Flexion :WFL  EIL- difficulty to perform     Repeated C ext- no change  Repeated retraction 40% limited- repeated motions not performed    Slump: -SLR:  Anabel: - Faddir: -   Hip distraction: nt  Ely’s: Nt  C-distraction: -  MANDO:GH post/inf glide hypomobile   Thoracic spine not assessed  Palpation: GH WFL  Shoulder A/PROM:  Flex L 140/140 R 90p/ 105p  Abd: L 135/140 R 80p/100p  ER: L 55 R 40p/50  IR : L T9 R T11/ 35   ER overhead L 60 R /55    GH strength: flexion: B 4   ER:L 5/5  R 4/5   IR: 4/5 B  Lumbar ROM L R Strength ankle L R   Flex  WFL  DF     Ext 30% limited  PF 5 5   Rot 40% limited 30% limited eversion     EIL Unable to perform  inversion     SB         Hip A/PROM WFL WFL HIP     Flex  /  / Flex 4 4   ER at 90   ER     IR at 90   IR     Abd   Abd     Ext   Ext 4- 4-           Knee A/PROM   Knee     Flex   Flex 4+ 4+   Ext   Ext 4+ 4+         Assessment:        Pt at this time does not demonstrate any significant improvement in her shoulder as well as her low back from initial IE.  At this time pt will benefit consultation with orthopedics/sports medicine in regards of her R shoulder, referral was performed and she had pain management referral from family physician from May of this year.  Pt at this time likely will benefit follow up with these 2 referrals in regards of her current presentation.  Thank you very much for this kind referral.          Etiologic factors include insidious onset.    Negative prognostic indicators: chronic LBP and difficulty with multistep directions. Positive prognostic indicators: good attitude. She would need to have a signed POC for treatment for her R shoulder since she doesn't have a script. She would benefit from PT to help her RUE strength, ROM, and improved lifting overhead. Please contact me if you have any further questions or recommendations. Thank you very much for the kind referral.         Plan  Patient would benefit from:Skilled physical therapy and skilled PT to help her shoulder pain. She would be treated as a direct access patient and if you agree with the current POC please sign.    Planned therapy interventions: manual therapy, neuromuscular re-education, stretching, strengthening, therapeutic activities, therapeutic exercise, patient education, home exercise program, and activity modification.    Frequency: 2x week  Duration in weeks: 8  Treatment plan discussed with: patient             Goals: Patient's goal is  to improve 100%    Precautions: lear  Precautions: needs towel on pillow due to temperature sensitivity, learns best with demonstration, direct access- shoulder  Dx:R (DA) GH abd hypomobility and lumbar ext  "hypomobility      Daily Treatment Diary     Manuals 06/21 6/25 6/27 6/12 6/14 6/18   GH PROM and multidirectional mobs gr 3-4 PROM LNK PROM JL TAS 8' 8' PROM only 8'   Assess thoracic spine                           Ther Ex         Wand flexion             pulleys 3 min  3 min 3 min 3' 3' 3'   Wall slides 20x 20x 20x 15x 20x 20x   ER wand 20x   20x 20x 20x   Wand haircut 20x 20x 20x 20x 20x 20x   Lumbar ext against table         S/l ER 20x 20x 20x 20x 20x 20x   Shoulder flexion 2x10 to 90 deg 2x10 to 90 deg 2 x 10    Supine AA 10x to 90*   Supine wand flexion  20x 20x 20x      Scap Retract  3\" x10 3\" x 10      Neuro Re-ed         Trial repeated c-retraction 20 1x10 TC       Scap retraction         Thoracic ext over chair         ER iso    10x :05 10x :05 10x :05   C-ext    20x 20x 20x    Ther Activity         Reaching cabinet         stairs                  Gait Training         treadmill                  Modalities         MHP R shoulder 10' NT 10 min HP 10' 10' 10'                        "

## 2024-07-31 ENCOUNTER — OFFICE VISIT (OUTPATIENT)
Dept: OBGYN CLINIC | Facility: CLINIC | Age: 74
End: 2024-07-31
Payer: COMMERCIAL

## 2024-07-31 ENCOUNTER — APPOINTMENT (OUTPATIENT)
Dept: RADIOLOGY | Facility: AMBULARY SURGERY CENTER | Age: 74
End: 2024-07-31
Attending: STUDENT IN AN ORGANIZED HEALTH CARE EDUCATION/TRAINING PROGRAM
Payer: COMMERCIAL

## 2024-07-31 VITALS
SYSTOLIC BLOOD PRESSURE: 114 MMHG | DIASTOLIC BLOOD PRESSURE: 78 MMHG | WEIGHT: 174 LBS | HEIGHT: 65 IN | BODY MASS INDEX: 28.99 KG/M2 | HEART RATE: 71 BPM

## 2024-07-31 DIAGNOSIS — M75.121 NONTRAUMATIC COMPLETE TEAR OF RIGHT ROTATOR CUFF: Primary | ICD-10-CM

## 2024-07-31 DIAGNOSIS — M43.16 SPONDYLOLISTHESIS OF LUMBAR REGION: ICD-10-CM

## 2024-07-31 DIAGNOSIS — M25.511 RIGHT SHOULDER PAIN, UNSPECIFIED CHRONICITY: ICD-10-CM

## 2024-07-31 DIAGNOSIS — M54.50 CHRONIC RIGHT-SIDED LOW BACK PAIN WITHOUT SCIATICA: ICD-10-CM

## 2024-07-31 DIAGNOSIS — G89.29 CHRONIC RIGHT-SIDED LOW BACK PAIN WITHOUT SCIATICA: ICD-10-CM

## 2024-07-31 DIAGNOSIS — M12.811 ROTATOR CUFF ARTHROPATHY OF RIGHT SHOULDER: ICD-10-CM

## 2024-07-31 PROCEDURE — 73030 X-RAY EXAM OF SHOULDER: CPT

## 2024-07-31 PROCEDURE — 99204 OFFICE O/P NEW MOD 45 MIN: CPT | Performed by: STUDENT IN AN ORGANIZED HEALTH CARE EDUCATION/TRAINING PROGRAM

## 2024-07-31 PROCEDURE — 1160F RVW MEDS BY RX/DR IN RCRD: CPT | Performed by: STUDENT IN AN ORGANIZED HEALTH CARE EDUCATION/TRAINING PROGRAM

## 2024-07-31 PROCEDURE — 20610 DRAIN/INJ JOINT/BURSA W/O US: CPT | Performed by: STUDENT IN AN ORGANIZED HEALTH CARE EDUCATION/TRAINING PROGRAM

## 2024-07-31 PROCEDURE — 1159F MED LIST DOCD IN RCRD: CPT | Performed by: STUDENT IN AN ORGANIZED HEALTH CARE EDUCATION/TRAINING PROGRAM

## 2024-07-31 RX ORDER — TRIAMCINOLONE ACETONIDE 40 MG/ML
40 INJECTION, SUSPENSION INTRA-ARTICULAR; INTRAMUSCULAR
Status: COMPLETED | OUTPATIENT
Start: 2024-07-31 | End: 2024-07-31

## 2024-07-31 RX ORDER — BUPIVACAINE HYDROCHLORIDE 2.5 MG/ML
4 INJECTION, SOLUTION INFILTRATION; PERINEURAL
Status: COMPLETED | OUTPATIENT
Start: 2024-07-31 | End: 2024-07-31

## 2024-07-31 RX ADMIN — BUPIVACAINE HYDROCHLORIDE 4 ML: 2.5 INJECTION, SOLUTION INFILTRATION; PERINEURAL at 10:30

## 2024-07-31 RX ADMIN — TRIAMCINOLONE ACETONIDE 40 MG: 40 INJECTION, SUSPENSION INTRA-ARTICULAR; INTRAMUSCULAR at 10:30

## 2024-07-31 NOTE — LETTER
"July 31, 2024     Wilman De La O, PT    Patient: Cris Infante   YOB: 1950   Date of Visit: 7/31/2024       Dear Dr. De La O:    Thank you for referring Cris Infante to me for evaluation. Below are my notes for this consultation.    If you have questions, please do not hesitate to call me. I look forward to following your patient along with you.         Sincerely,        Juanjose Lloyd DO        CC: No Recipients    Juanjose Lloyd DO  7/31/2024 12:27 PM  Sign when Signing Visit  Ortho Sports Medicine Shoulder New Patient Visit     Assesment:   73 y.o. female right shoulder chronic rotator cuff tear    Plan:  The patient's diagnosis and treatment were discussed at length today. We discussed no treatment, non-operative treatment, and operative treatment.    Cris presents today for initial consultation right shoulder chronic rotator cuff tear.  We did discuss both nonsurgical and surgical intervention.  Discussed with her that nonsurgical treatment would include outpatient physical therapy and a cortisone injection.  Did provide her with a referral to outpatient physical therapy at today's visit.  I also provided her with a right subacromial bursal injection.  She can continue ice and over-the-counter medication as needed for pain relief.  She can follow-up in 3 months for repeat cortisone injection or as needed.    Large joint arthrocentesis: R subacromial bursa  Universal Protocol:  Consent: Verbal consent obtained.  Risks and benefits: risks, benefits and alternatives were discussed  Consent given by: patient  Time out: Immediately prior to procedure a \"time out\" was called to verify the correct patient, procedure, equipment, support staff and site/side marked as required.  Timeout called at: 7/31/2024 10:38 AM.  Patient understanding: patient states understanding of the procedure being performed  Patient consent: the patient's understanding of the procedure matches consent given  Site marked: " the operative site was marked  Patient identity confirmed: verbally with patient  Supporting Documentation  Indications: pain and diagnostic evaluation   Procedure Details  Location: shoulder - R subacromial bursa  Preparation: Patient was prepped and draped in the usual sterile fashion  Needle size: 22 G  Ultrasound guidance: no  Approach: posterior  Medications administered: 4 mL bupivacaine 0.25 %; 40 mg triamcinolone acetonide 40 mg/mL    Patient tolerance: patient tolerated the procedure well with no immediate complications  Dressing:  Sterile dressing applied          Conservative treatment:    Ice to shoulder 1-2 times daily, for 20 minutes at a time.  PT for ROM and strengthening to shoulder, rotator cuff, scapular stabilizers.  Home exercise program for shoulder, including ROM and strenthening.  Instructions given to patient of what exercises to perform.  Let pain guide return to activities.      Imaging:    All imaging from today was reviewed by myself and explained to the patient.       Injection:    The risks and benefits of the injection (which include but are not limited to: infection, bleeding,damage to nerve/artery, need for further intervention), as well as the risks and benefits of all alternative treatments were explained and understood.  The patient elected to proceed with injection. The procedure was done with aseptic technique, and the patient tolerated the procedure well with no complications.  A corticosteroid injection of the subacromial space was performed.  The patient should take 1-2 days off of activity, with gradual return to activity as tolerated.  Ice to the shoulder 1-2 times daily for 20 minutes, for next 24-48 hrs.      Surgery:     No surgery is recommended at this point, continue with conservative treatment plan as noted.      Follow up:    Return in about 3 months (around 10/31/2024) for repeat CSI or as needed.        Chief Complaint   Patient presents with   • Right Shoulder -  Pain     Pain for 2 months   Unable to lift arm above head        History of Present Illness:    The patient is a 73 y.o., right hand dominant female whose occupation is retired, referred to me by Wilman De La O PT, seen in clinic for consultation of right shoulder pain.  She states that she has been having right shoulder pain now for approximately 2 to 3 months without any specific injury or trauma.  She states that the pain initially began when she was trying to get dressed and had sharp pain.  She states that her pain is predominantly on the anterior lateral aspect of her shoulder.  She states that this is because limited movement and issues with activities of daily living.  She attempted any cortisone injections or outpatient physical therapy.  She is currently managing her pain with ice and over-the-counter meds she denies any numbness or tingling.      Shoulder Surgical History:  None    Past Medical, Social and Family History:  Past Medical History:   Diagnosis Date   • Abrasion of knee, bilateral 08/31/2022   • Allergic rhinitis 12/02/2021   • Bilateral hearing loss 04/29/2022   • Bilateral impacted cerumen 04/29/2022   • BMI 31.0-31.9,adult 12/20/2023   • BMI 31.0-31.9,adult 05/13/2024   • BMI 32.0-32.9,adult 04/29/2022   • BMI 33.0-33.9,adult 12/02/2021   • Cataract, bilateral    • Chronic bilateral low back pain without sciatica 12/08/2022   • Chronic right-sided low back pain with right-sided sciatica 12/20/2023   • Constipation    • Constipation 12/02/2021   • COVID-19 12/04/2023   • Deficiency anemia 03/19/2024   • Elevated glucose level    • Fatigue    • History of anemia    • Hypertriglyceridemia    • Hypertriglyceridemia 12/02/2021   • Low vitamin B12 level 05/13/2024   • Medicare annual wellness visit, subsequent 12/02/2021   • Medicare annual wellness visit, subsequent 12/08/2022   • Medicare annual wellness visit, subsequent 12/20/2023   • Monoclonal gammopathy 05/13/2024   • Obesity    •  Osteopenia    • Osteopenia of multiple sites 2022   • Pain in both knees 2022   • Pain of left heel 2021   • Post-menopausal    • Prediabetes 2023   • Rash    • Spondylolisthesis of lumbar region 2024   • Type 2 diabetes mellitus without complication, without long-term current use of insulin ([facility]) 2024   • Vitamin D deficiency    • Vitamin D deficiency 2021     Past Surgical History:   Procedure Laterality Date   • CATARACT EXTRACTION, BILATERAL     •  SECTION     • COLONOSCOPY  2014    Advise f/u colonoscopy; see by [facility] 2020 at office; she is supposed to have a colonoscopy, but due to Covid 19, she wants to hold for now    • DXA PROCEDURE (HISTORICAL)  2020   • MAMMO (HISTORICAL)  2020     No Known Allergies  Current Outpatient Medications on File Prior to Visit   Medication Sig Dispense Refill   • calcium carbonate (OS-FELICITA) 600 MG tablet Take 600 mg by mouth 2 (two) times a day with meals     • cholecalciferol (VITAMIN D3) 1,000 units tablet Take 1,000 Units by mouth daily Patient reports taking 2,000 units daily     • glucose blood test strip Use 1 each daily as needed Use as instructed     • meloxicam (MOBIC) 15 mg tablet Take 1 tablet (15 mg total) by mouth daily as needed for moderate pain 30 tablet 0   • Multiple Vitamin (MULTI-VITAMIN DAILY) TABS Take by mouth     • psyllium (METAMUCIL) 58.6 % packet Take 1 packet by mouth as needed     • senna-docusate sodium (SENOKOT-S) 8.6-50 mg per tablet Take 2 tablets by mouth 2 (two) times a day     • vitamin B-12 (VITAMIN B-12) 1,000 mcg tablet Take 1,000 mcg by mouth daily       No current facility-administered medications on file prior to visit.     Social History     Socioeconomic History   • Marital status: /Civil Union     Spouse name: Not on file   • Number of children: Not on file   • Years of education: Not on file   • Highest education level: Not on file   Occupational  "History   • Not on file   Tobacco Use   • Smoking status: Never   • Smokeless tobacco: Never   Vaping Use   • Vaping status: Never Used   Substance and Sexual Activity   • Alcohol use: Yes     Alcohol/week: 1.0 standard drink of alcohol     Types: 1 Cans of beer per week     Comment: occasional   • Drug use: No   • Sexual activity: Not Currently     Partners: Male   Other Topics Concern   • Not on file   Social History Narrative    Lives with spouse    Pap smear: She goes to her gyn for pap smear    Annual eye exam: Advise     Social Determinants of Health     Financial Resource Strain: Low Risk  (12/20/2023)    Overall Financial Resource Strain (CARDIA)    • Difficulty of Paying Living Expenses: Not very hard   Food Insecurity: Not on file   Transportation Needs: No Transportation Needs (12/20/2023)    PRAPARE - Transportation    • Lack of Transportation (Medical): No    • Lack of Transportation (Non-Medical): No   Physical Activity: Not on file   Stress: Not on file   Social Connections: Not on file   Intimate Partner Violence: Not on file   Housing Stability: Not on file         I have reviewed the past medical, surgical, social and family history, medications and allergies as documented in the EMR.    Review of systems: ROS is negative other than that noted in the HPI.  Constitutional: Negative for fatigue and fever.   HENT: Negative for sore throat.    Respiratory: Negative for shortness of breath.    Cardiovascular: Negative for chest pain.   Gastrointestinal: Negative for abdominal pain.   Endocrine: Negative for cold intolerance and heat intolerance.   Genitourinary: Negative for flank pain.   Musculoskeletal: Negative for back pain.   Skin: Negative for rash.   Allergic/Immunologic: Negative for immunocompromised state.   Neurological: Negative for dizziness.   Psychiatric/Behavioral: Negative for agitation.      Physical Exam:    Blood pressure 114/78, pulse 71, height 5' 5\" (1.651 m), weight 78.9 kg (174 " lb).    General/Constitutional: NAD, well developed, well nourished  HENT: Normocephalic, atraumatic  CV: Intact distal pulses, regular rate  Resp: No respiratory distress or labored breathing  Lymphatic: No lymphadenopathy palpated  Neuro: Alert and Oriented x 3, no focal deficits  Psych: Normal mood, normal affect, normal judgement, normal behavior  Skin: Warm, dry, no rashes, no erythema      Shoulder focused exam:     Visual inspection of the shoulder demonstrates normal contour without atrophy  No evidence of scapular dyskinesia or atrophy.  No scapular winging  Active and passive range of motion demonstrates forward flexion to 90, abduction to 80, difficulty reaching back of head, external rotation is 30 with the arm the side, internal rotation to L1   Rotator cuff strength is 4/5 to resisted forward flexion, 4/5 resisted abduction, 4/5 resisted external rotation, 5/5 resisted internal rotation  No tenderness to palpation at the distal clavicle, AC joint, acromion, or scapular spine  No pain with cross-body adduction  + Neer's test, + modified Ching impingement test  Negative external rotation lag sign  Negative belly press, negative lift-off  - speed's and Yergason's test  + tenderness to palpation at the bicipital groove  - [place]'s test        UE NV Exam: +2 Radial pulses bilaterally  Sensation intact to light touch C5-T1 bilaterally, Radial/median/ulnar nerve motor intact      Bilateral elbow, wrist, and and forearm ROM full, painless with passive ROM, no ttp or crepitance throughout extremities below shoulder joint    Cervical ROM is full without pain, numbness or tingling      Shoulder Imaging    X-rays of the right shoulder were reviewed, which demonstrate no acute osseous abnormalities. High riding humeral head indicative of chronic rotator cuff tear.  I have reviewed the radiology report and do not currently have a radiology reading from [facility], but will check the result once the reading is  performed.        Scribe Attestation      I,:  Yordy Barnhart am acting as a scribe while in the presence of the attending physician.:       I,:  Juanjose Lloyd, DO personally performed the services described in this documentation    as scribed in my presence.:

## 2024-07-31 NOTE — PROGRESS NOTES
"Ortho Sports Medicine Shoulder New Patient Visit     Assesment:   73 y.o. female right shoulder chronic rotator cuff tear    Plan:  The patient's diagnosis and treatment were discussed at length today. We discussed no treatment, non-operative treatment, and operative treatment.    Cris presents today for initial consultation right shoulder chronic rotator cuff tear.  We did discuss both nonsurgical and surgical intervention.  Discussed with her that nonsurgical treatment would include outpatient physical therapy and a cortisone injection.  Did provide her with a referral to outpatient physical therapy at today's visit.  I also provided her with a right subacromial bursal injection.  She can continue ice and over-the-counter medication as needed for pain relief.  She can follow-up in 3 months for repeat cortisone injection or as needed.    Large joint arthrocentesis: R subacromial bursa  Universal Protocol:  Consent: Verbal consent obtained.  Risks and benefits: risks, benefits and alternatives were discussed  Consent given by: patient  Time out: Immediately prior to procedure a \"time out\" was called to verify the correct patient, procedure, equipment, support staff and site/side marked as required.  Timeout called at: 7/31/2024 10:38 AM.  Patient understanding: patient states understanding of the procedure being performed  Patient consent: the patient's understanding of the procedure matches consent given  Site marked: the operative site was marked  Patient identity confirmed: verbally with patient  Supporting Documentation  Indications: pain and diagnostic evaluation   Procedure Details  Location: shoulder - R subacromial bursa  Preparation: Patient was prepped and draped in the usual sterile fashion  Needle size: 22 G  Ultrasound guidance: no  Approach: posterior  Medications administered: 4 mL bupivacaine 0.25 %; 40 mg triamcinolone acetonide 40 mg/mL    Patient tolerance: patient tolerated the procedure well " with no immediate complications  Dressing:  Sterile dressing applied          Conservative treatment:    Ice to shoulder 1-2 times daily, for 20 minutes at a time.  PT for ROM and strengthening to shoulder, rotator cuff, scapular stabilizers.  Home exercise program for shoulder, including ROM and strenthening.  Instructions given to patient of what exercises to perform.  Let pain guide return to activities.      Imaging:    All imaging from today was reviewed by myself and explained to the patient.       Injection:    The risks and benefits of the injection (which include but are not limited to: infection, bleeding,damage to nerve/artery, need for further intervention), as well as the risks and benefits of all alternative treatments were explained and understood.  The patient elected to proceed with injection. The procedure was done with aseptic technique, and the patient tolerated the procedure well with no complications.  A corticosteroid injection of the subacromial space was performed.  The patient should take 1-2 days off of activity, with gradual return to activity as tolerated.  Ice to the shoulder 1-2 times daily for 20 minutes, for next 24-48 hrs.      Surgery:     No surgery is recommended at this point, continue with conservative treatment plan as noted.      Follow up:    Return in about 3 months (around 10/31/2024) for repeat CSI or as needed.        Chief Complaint   Patient presents with    Right Shoulder - Pain     Pain for 2 months   Unable to lift arm above head        History of Present Illness:    The patient is a 73 y.o., right hand dominant female whose occupation is retired, referred to me by Wilman De La O, PT, seen in clinic for consultation of right shoulder pain.  She states that she has been having right shoulder pain now for approximately 2 to 3 months without any specific injury or trauma.  She states that the pain initially began when she was trying to get dressed and had sharp pain.   She states that her pain is predominantly on the anterior lateral aspect of her shoulder.  She states that this is because limited movement and issues with activities of daily living.  She attempted any cortisone injections or outpatient physical therapy.  She is currently managing her pain with ice and over-the-counter meds she denies any numbness or tingling.      Shoulder Surgical History:  None    Past Medical, Social and Family History:  Past Medical History:   Diagnosis Date    Abrasion of knee, bilateral 2022    Allergic rhinitis 2021    Bilateral hearing loss 2022    Bilateral impacted cerumen 2022    BMI 31.0-31.9,adult 2023    BMI 31.0-31.9,adult 2024    BMI 32.0-32.9,adult 2022    BMI 33.0-33.9,adult 2021    Cataract, bilateral     Chronic bilateral low back pain without sciatica 2022    Chronic right-sided low back pain with right-sided sciatica 2023    Constipation     Constipation 2021    COVID-19 2023    Deficiency anemia 2024    Elevated glucose level     Fatigue     History of anemia     Hypertriglyceridemia     Hypertriglyceridemia 2021    Low vitamin B12 level 2024    Medicare annual wellness visit, subsequent 2021    Medicare annual wellness visit, subsequent 2022    Medicare annual wellness visit, subsequent 2023    Monoclonal gammopathy 2024    Obesity     Osteopenia     Osteopenia of multiple sites 2022    Pain in both knees 2022    Pain of left heel 2021    Post-menopausal     Prediabetes 2023    Rash     Spondylolisthesis of lumbar region 2024    Type 2 diabetes mellitus without complication, without long-term current use of insulin ([facility]) 2024    Vitamin D deficiency     Vitamin D deficiency 2021     Past Surgical History:   Procedure Laterality Date    CATARACT EXTRACTION, BILATERAL  2018     SECTION      COLONOSCOPY  2014     Advise f/u colonoscopy; see by [facility] Jan 2020 at office; she is supposed to have a colonoscopy, but due to Covid 19, she wants to hold for now     DXA PROCEDURE (HISTORICAL)  01/2020    MAMMO (HISTORICAL)  01/2020     No Known Allergies  Current Outpatient Medications on File Prior to Visit   Medication Sig Dispense Refill    calcium carbonate (OS-FELICITA) 600 MG tablet Take 600 mg by mouth 2 (two) times a day with meals      cholecalciferol (VITAMIN D3) 1,000 units tablet Take 1,000 Units by mouth daily Patient reports taking 2,000 units daily      glucose blood test strip Use 1 each daily as needed Use as instructed      meloxicam (MOBIC) 15 mg tablet Take 1 tablet (15 mg total) by mouth daily as needed for moderate pain 30 tablet 0    Multiple Vitamin (MULTI-VITAMIN DAILY) TABS Take by mouth      psyllium (METAMUCIL) 58.6 % packet Take 1 packet by mouth as needed      senna-docusate sodium (SENOKOT-S) 8.6-50 mg per tablet Take 2 tablets by mouth 2 (two) times a day      vitamin B-12 (VITAMIN B-12) 1,000 mcg tablet Take 1,000 mcg by mouth daily       No current facility-administered medications on file prior to visit.     Social History     Socioeconomic History    Marital status: /Civil Union     Spouse name: Not on file    Number of children: Not on file    Years of education: Not on file    Highest education level: Not on file   Occupational History    Not on file   Tobacco Use    Smoking status: Never    Smokeless tobacco: Never   Vaping Use    Vaping status: Never Used   Substance and Sexual Activity    Alcohol use: Yes     Alcohol/week: 1.0 standard drink of alcohol     Types: 1 Cans of beer per week     Comment: occasional    Drug use: No    Sexual activity: Not Currently     Partners: Male   Other Topics Concern    Not on file   Social History Narrative    Lives with spouse    Pap smear: She goes to her gyn for pap smear    Annual eye exam: Advise     Social Determinants of Health  "    Financial Resource Strain: Low Risk  (12/20/2023)    Overall Financial Resource Strain (CARDIA)     Difficulty of Paying Living Expenses: Not very hard   Food Insecurity: Not on file   Transportation Needs: No Transportation Needs (12/20/2023)    PRAPARE - Transportation     Lack of Transportation (Medical): No     Lack of Transportation (Non-Medical): No   Physical Activity: Not on file   Stress: Not on file   Social Connections: Not on file   Intimate Partner Violence: Not on file   Housing Stability: Not on file         I have reviewed the past medical, surgical, social and family history, medications and allergies as documented in the EMR.    Review of systems: ROS is negative other than that noted in the HPI.  Constitutional: Negative for fatigue and fever.   HENT: Negative for sore throat.    Respiratory: Negative for shortness of breath.    Cardiovascular: Negative for chest pain.   Gastrointestinal: Negative for abdominal pain.   Endocrine: Negative for cold intolerance and heat intolerance.   Genitourinary: Negative for flank pain.   Musculoskeletal: Negative for back pain.   Skin: Negative for rash.   Allergic/Immunologic: Negative for immunocompromised state.   Neurological: Negative for dizziness.   Psychiatric/Behavioral: Negative for agitation.      Physical Exam:    Blood pressure 114/78, pulse 71, height 5' 5\" (1.651 m), weight 78.9 kg (174 lb).    General/Constitutional: NAD, well developed, well nourished  HENT: Normocephalic, atraumatic  CV: Intact distal pulses, regular rate  Resp: No respiratory distress or labored breathing  Lymphatic: No lymphadenopathy palpated  Neuro: Alert and Oriented x 3, no focal deficits  Psych: Normal mood, normal affect, normal judgement, normal behavior  Skin: Warm, dry, no rashes, no erythema      Shoulder focused exam:     Visual inspection of the shoulder demonstrates normal contour without atrophy  No evidence of scapular dyskinesia or atrophy.  No scapular " winging  Active and passive range of motion demonstrates forward flexion to 90, abduction to 80, difficulty reaching back of head, external rotation is 30 with the arm the side, internal rotation to L1   Rotator cuff strength is 4/5 to resisted forward flexion, 4/5 resisted abduction, 4/5 resisted external rotation, 5/5 resisted internal rotation  No tenderness to palpation at the distal clavicle, AC joint, acromion, or scapular spine  No pain with cross-body adduction  + Neer's test, + modified Ching impingement test  Negative external rotation lag sign  Negative belly press, negative lift-off  - speed's and Yergason's test  + tenderness to palpation at the bicipital groove  - [place]'s test        UE NV Exam: +2 Radial pulses bilaterally  Sensation intact to light touch C5-T1 bilaterally, Radial/median/ulnar nerve motor intact      Bilateral elbow, wrist, and and forearm ROM full, painless with passive ROM, no ttp or crepitance throughout extremities below shoulder joint    Cervical ROM is full without pain, numbness or tingling      Shoulder Imaging    X-rays of the right shoulder were reviewed, which demonstrate no acute osseous abnormalities. High riding humeral head indicative of chronic rotator cuff tear.  I have reviewed the radiology report and do not currently have a radiology reading from [facility], but will check the result once the reading is performed.        Scribe Attestation      I,:  Yordy Barnhart am acting as a scribe while in the presence of the attending physician.:       I,:  Juanjose Lloyd, DO personally performed the services described in this documentation    as scribed in my presence.:

## 2024-08-08 ENCOUNTER — RA CDI HCC (OUTPATIENT)
Dept: OTHER | Facility: HOSPITAL | Age: 74
End: 2024-08-08

## 2024-08-13 ENCOUNTER — OFFICE VISIT (OUTPATIENT)
Dept: HEMATOLOGY ONCOLOGY | Facility: CLINIC | Age: 74
End: 2024-08-13
Payer: COMMERCIAL

## 2024-08-13 ENCOUNTER — APPOINTMENT (OUTPATIENT)
Dept: LAB | Facility: CLINIC | Age: 74
End: 2024-08-13
Payer: COMMERCIAL

## 2024-08-13 VITALS
RESPIRATION RATE: 16 BRPM | BODY MASS INDEX: 28.96 KG/M2 | TEMPERATURE: 97.5 F | OXYGEN SATURATION: 98 % | HEART RATE: 74 BPM | WEIGHT: 174 LBS | DIASTOLIC BLOOD PRESSURE: 70 MMHG | SYSTOLIC BLOOD PRESSURE: 130 MMHG

## 2024-08-13 DIAGNOSIS — E11.9 TYPE 2 DIABETES MELLITUS WITHOUT COMPLICATION, WITHOUT LONG-TERM CURRENT USE OF INSULIN (HCC): ICD-10-CM

## 2024-08-13 DIAGNOSIS — D47.2 MONOCLONAL GAMMOPATHY: Primary | ICD-10-CM

## 2024-08-13 DIAGNOSIS — R77.8 ABNORMAL SERUM PROTEIN ELECTROPHORESIS: ICD-10-CM

## 2024-08-13 LAB
ALBUMIN SERPL BCG-MCNC: 4.1 G/DL (ref 3.5–5)
ALP SERPL-CCNC: 66 U/L (ref 34–104)
ALT SERPL W P-5'-P-CCNC: 12 U/L (ref 7–52)
ANION GAP SERPL CALCULATED.3IONS-SCNC: 5 MMOL/L (ref 4–13)
AST SERPL W P-5'-P-CCNC: 13 U/L (ref 5–45)
BASOPHILS # BLD AUTO: 0.06 THOUSANDS/ÂΜL (ref 0–0.1)
BASOPHILS NFR BLD AUTO: 1 % (ref 0–1)
BILIRUB SERPL-MCNC: 0.39 MG/DL (ref 0.2–1)
BUN SERPL-MCNC: 17 MG/DL (ref 5–25)
CALCIUM SERPL-MCNC: 9.5 MG/DL (ref 8.4–10.2)
CHLORIDE SERPL-SCNC: 100 MMOL/L (ref 96–108)
CO2 SERPL-SCNC: 28 MMOL/L (ref 21–32)
CREAT SERPL-MCNC: 0.79 MG/DL (ref 0.6–1.3)
EOSINOPHIL # BLD AUTO: 0.13 THOUSAND/ÂΜL (ref 0–0.61)
EOSINOPHIL NFR BLD AUTO: 2 % (ref 0–6)
ERYTHROCYTE [DISTWIDTH] IN BLOOD BY AUTOMATED COUNT: 14.7 % (ref 11.6–15.1)
EST. AVERAGE GLUCOSE BLD GHB EST-MCNC: 143 MG/DL
GLUCOSE SERPL-MCNC: 102 MG/DL (ref 65–140)
HBA1C MFR BLD: 6.6 %
HCT VFR BLD AUTO: 37.4 % (ref 36.5–46.1)
HGB BLD-MCNC: 11.7 G/DL (ref 12–15.4)
IGA SERPL-MCNC: 235 MG/DL (ref 66–433)
IGG SERPL-MCNC: 1328 MG/DL (ref 635–1741)
IGM SERPL-MCNC: 163 MG/DL (ref 45–281)
IMM GRANULOCYTES # BLD AUTO: 0.01 THOUSAND/UL (ref 0–0.2)
IMM GRANULOCYTES NFR BLD AUTO: 0 % (ref 0–2)
LYMPHOCYTES # BLD AUTO: 2.49 THOUSANDS/ÂΜL (ref 0.6–4.47)
LYMPHOCYTES NFR BLD AUTO: 39 % (ref 14–44)
MCH RBC QN AUTO: 26.1 PG (ref 26.8–34.3)
MCHC RBC AUTO-ENTMCNC: 31.3 G/DL (ref 31.4–37.4)
MCV RBC AUTO: 84 FL (ref 82–98)
MONOCYTES # BLD AUTO: 0.65 THOUSAND/ÂΜL (ref 0.17–1.22)
MONOCYTES NFR BLD AUTO: 10 % (ref 4–12)
NEUTROPHILS # BLD AUTO: 3.02 THOUSANDS/ÂΜL (ref 1.85–7.62)
NEUTS SEG NFR BLD AUTO: 48 % (ref 43–75)
NRBC BLD AUTO-RTO: 0 /100 WBCS
PLATELET # BLD AUTO: 231 THOUSANDS/UL (ref 149–390)
PMV BLD AUTO: 10.2 FL (ref 8.9–12.7)
POTASSIUM SERPL-SCNC: 4.2 MMOL/L (ref 3.5–5.3)
PROT SERPL-MCNC: 7.4 G/DL (ref 6.4–8.4)
RBC # BLD AUTO: 4.48 MILLION/UL (ref 3.88–5.12)
SODIUM SERPL-SCNC: 133 MMOL/L (ref 135–147)
WBC # BLD AUTO: 6.36 THOUSAND/UL (ref 4.31–10.16)

## 2024-08-13 PROCEDURE — 80053 COMPREHEN METABOLIC PANEL: CPT

## 2024-08-13 PROCEDURE — 83521 IG LIGHT CHAINS FREE EACH: CPT

## 2024-08-13 PROCEDURE — 82784 ASSAY IGA/IGD/IGG/IGM EACH: CPT

## 2024-08-13 PROCEDURE — 36415 COLL VENOUS BLD VENIPUNCTURE: CPT

## 2024-08-13 PROCEDURE — 85025 COMPLETE CBC W/AUTO DIFF WBC: CPT

## 2024-08-13 PROCEDURE — 99212 OFFICE O/P EST SF 10 MIN: CPT | Performed by: INTERNAL MEDICINE

## 2024-08-13 PROCEDURE — 84165 PROTEIN E-PHORESIS SERUM: CPT

## 2024-08-13 PROCEDURE — 86334 IMMUNOFIX E-PHORESIS SERUM: CPT

## 2024-08-13 PROCEDURE — 83036 HEMOGLOBIN GLYCOSYLATED A1C: CPT

## 2024-08-13 NOTE — PROGRESS NOTES
Hematology Outpatient Follow - Up Note  Cris Infante 73 y.o. adult MRN: @ Encounter: [id number]        Date:  8/13/2024        Assessment/ Plan:    #1.  IgG lambda monoclonal gammopathy of undetermined significance, too small to be quantified      She was asked to have the blood work done today if no progression we will see the patient on as-needed basis        Labs and imaging studies are reviewed by ordering provider once results are available. If there are findings that need immediate attention, you will be contacted when results available.   Discussing results and the implication on your healthcare is best discussed in person at your follow-up visit.       HPI:  73-year-old -American female with history of chronic bilateral lower back pain with sciatica, constipation, COVID-19 infection, advanced degenerative arthritis, hypertriglyceridemia, hearing loss, she was found to have mild anemia on 12/2023 with hemoglobin of 11.4, WBC 4.9, platelets 272,000, MCV 84, normal RDW 1     This happened few weeks after COVID infection     Previous blood work in 2020 hemoglobin 12.4, MCV 83, platelets 247,000, WBC 6.2     As workup she had serum protein electrophoresis showed IgG lambda monoclonal gammopathy, too small to be quantified     She does not smoke or drink     Review denied any headache blurred vision dysuria hematuria melena hematochezia however she reported constipation, no skin rash, dry eyes, night sweats, low-grade fever or weight changes    Interval History:        Previous Treatment:         Test Results:    Imaging: XR shoulder 2+ vw right    Result Date: 7/31/2024  Narrative: RIGHT SHOULDER INDICATION:   Pain in right shoulder. COMPARISON:  None. VIEWS:  XR SHOULDER 2+ VW RIGHT FINDINGS: There is no acute fracture or dislocation. No there is mild superior subluxation of the glenohumeral joint. The shoulder is mildly high riding. Osteophyte at the margin of the humeral head and neck. Glenohumeral  space itself is relatively preserved. Osteophyte of the superior glenoid. Acromioclavicular joint is aligned. No lytic or blastic osseous lesion. Osteopenia. Soft tissues are unremarkable.     Impression: Mildly high riding shoulder with superior subluxation glenohumeral joint. This may indicate a degree of rotator cuff laxity. Mild degenerative marginal osteophyte formation. Intact acromioclavicular joint. No fracture or trang dislocation Electronically signed: 07/31/2024 01:28 PM Alireza Enciso MD      Labs:   Lab Results   Component Value Date    WBC 5.56 03/19/2024    HGB 12.0 03/19/2024    HCT 38.4 03/19/2024    MCV 83 03/19/2024     03/19/2024     Lab Results   Component Value Date    K 4.0 12/21/2023     12/21/2023    CO2 28 12/21/2023    BUN 10 12/21/2023    CREATININE 0.71 12/21/2023    GLUF 117 (H) 12/21/2023    CALCIUM 9.7 12/21/2023    AST 15 12/21/2023    ALT 12 12/21/2023    ALKPHOS 58 12/21/2023    EGFR 84 12/21/2023       Lab Results   Component Value Date    IRON 44 (L) 03/19/2024    TIBC 292 03/19/2024    FERRITIN 52 03/19/2024       Lab Results   Component Value Date    VZQPEMQE84 292 03/19/2024         ROS: Review of Systems - Oncology       Current Medications: Reviewed  Allergies: Reviewed  PMH/FH/SH:  Reviewed      Physical Exam:    Body surface area is 1.86 meters squared.    Wt Readings from Last 3 Encounters:   08/13/24 78.9 kg (174 lb)   07/31/24 78.9 kg (174 lb)   06/13/24 80.8 kg (178 lb 3.2 oz)        Temp Readings from Last 3 Encounters:   08/13/24 97.5 °F (36.4 °C)   05/13/24 (!) 97.2 °F (36.2 °C) (Temporal)   05/06/24 (!) 97.2 °F (36.2 °C) (Temporal)        BP Readings from Last 3 Encounters:   08/13/24 130/70   07/31/24 114/78   06/13/24 124/80         Pulse Readings from Last 3 Encounters:   08/13/24 74   07/31/24 71   06/13/24 73        Physical Exam    ECOG PS:    Goals and Barriers:  Current Goal: Minimize effects of disease.   Barriers: None.      Patient's  Capacity to Self Care:  Patient is able to self care.    Code Status: [unfilled]

## 2024-08-14 LAB
KAPPA LC FREE SER-MCNC: 20.2 MG/L (ref 3.3–19.4)
KAPPA LC FREE/LAMBDA FREE SER: 1.15 {RATIO} (ref 0.26–1.65)
LAMBDA LC FREE SERPL-MCNC: 17.5 MG/L (ref 5.7–26.3)

## 2024-08-15 LAB
ALBUMIN SERPL ELPH-MCNC: 4.07 G/DL (ref 3.2–5.1)
ALBUMIN SERPL ELPH-MCNC: 57.3 % (ref 48–70)
ALPHA1 GLOB SERPL ELPH-MCNC: 0.28 G/DL (ref 0.15–0.47)
ALPHA1 GLOB SERPL ELPH-MCNC: 3.9 % (ref 1.8–7)
ALPHA2 GLOB SERPL ELPH-MCNC: 0.7 G/DL (ref 0.42–1.04)
ALPHA2 GLOB SERPL ELPH-MCNC: 9.9 % (ref 5.9–14.9)
BETA GLOB ABNORMAL SERPL ELPH-MCNC: 0.4 G/DL (ref 0.31–0.57)
BETA1 GLOB SERPL ELPH-MCNC: 5.7 % (ref 4.7–7.7)
BETA2 GLOB SERPL ELPH-MCNC: 5.3 % (ref 3.1–7.9)
BETA2+GAMMA GLOB SERPL ELPH-MCNC: 0.38 G/DL (ref 0.2–0.58)
GAMMA GLOB ABNORMAL SERPL ELPH-MCNC: 1.27 G/DL (ref 0.4–1.66)
GAMMA GLOB SERPL ELPH-MCNC: 17.9 % (ref 6.9–22.3)
IGG/ALB SER: 1.34 {RATIO} (ref 1.1–1.8)
INTERPRETATION UR IFE-IMP: NORMAL
PROT PATTERN SERPL ELPH-IMP: NORMAL
PROT SERPL-MCNC: 7.1 G/DL (ref 6.4–8.2)

## 2024-08-15 PROCEDURE — 86334 IMMUNOFIX E-PHORESIS SERUM: CPT | Performed by: STUDENT IN AN ORGANIZED HEALTH CARE EDUCATION/TRAINING PROGRAM

## 2024-08-15 PROCEDURE — 84165 PROTEIN E-PHORESIS SERUM: CPT | Performed by: STUDENT IN AN ORGANIZED HEALTH CARE EDUCATION/TRAINING PROGRAM

## 2024-08-16 ENCOUNTER — EVALUATION (OUTPATIENT)
Dept: PHYSICAL THERAPY | Facility: CLINIC | Age: 74
End: 2024-08-16
Payer: COMMERCIAL

## 2024-08-16 DIAGNOSIS — M25.511 CHRONIC RIGHT SHOULDER PAIN: Primary | ICD-10-CM

## 2024-08-16 DIAGNOSIS — M75.121 NONTRAUMATIC COMPLETE TEAR OF RIGHT ROTATOR CUFF: ICD-10-CM

## 2024-08-16 DIAGNOSIS — M12.811 ROTATOR CUFF TEAR ARTHROPATHY OF RIGHT SHOULDER: ICD-10-CM

## 2024-08-16 DIAGNOSIS — G89.29 CHRONIC RIGHT SHOULDER PAIN: Primary | ICD-10-CM

## 2024-08-16 DIAGNOSIS — M75.101 ROTATOR CUFF TEAR ARTHROPATHY OF RIGHT SHOULDER: ICD-10-CM

## 2024-08-16 PROCEDURE — 97161 PT EVAL LOW COMPLEX 20 MIN: CPT | Performed by: PHYSICAL THERAPIST

## 2024-08-16 PROCEDURE — 97110 THERAPEUTIC EXERCISES: CPT | Performed by: PHYSICAL THERAPIST

## 2024-08-16 NOTE — PROGRESS NOTES
PT Evaluation     Today's date: 2024  Patient name: Cris Infante  : 1950  MRN: [id number]  Referring provider: Juanjose Lloyd DO  Dx:   Encounter Diagnosis     ICD-10-CM    1. Chronic right shoulder pain  M25.511     G89.29                      Assessment  Impairments: abnormal or restricted ROM, activity intolerance, impaired physical strength, lacks appropriate home exercise program and pain with function    Assessment details: Patient presents with signs and symptoms consistent with referring diagnosis.  She presents with shoulder hypomobility especially in OH planes.  Mechanical assessment of shoulder revealed no signs of a derangement.   Positive prognostic indicators include:  Motivated to improve.  Negative prognostic indicators include: (-)  She presents with: pain, decreased: ROM, strength and  functional capacity.  She requires skilled PT to address these deficits and restore maximal functional capacity.  Thank you for this pleasant referral.    Understanding of Dx/Px/POC: good     Prognosis: good    Goals  ST-6 weeks  1.  Patient to be independent with HEP.  2.  Decrease pain at least 2 subjective levels.      LT-12 weeks.    1.   Patient to voice comfort with self management of condition.  2.  75% or > decreased pain.  3.  75% or > decreased functional deficits.  4.  Normalize AROM of all deficit planes.  5.  Normalize strength.      Plan  Patient would benefit from: skilled PT  Referral necessary: No  Planned modality interventions: cryotherapy    Planned therapy interventions: IADL retraining, joint mobilization, manual therapy, motor coordination training, neuromuscular re-education, patient education, postural training, self care, strengthening, stretching, therapeutic activities, therapeutic exercise, home exercise program, flexibility, ADL training, balance and body mechanics training    Frequency: 2x week  Duration in weeks: 8  Treatment plan discussed with:  patient        Subjective Evaluation    History of Present Illness  Onset date: > 1 month ago.  Mechanism of injury: Chief Complaint:  R shoulder pain    History:  Patient notes insidious onset of R shoulder pain beginning around a month ago.  She was seen by ortho, had xrays and an injection.  She notes some improvement since this injection.  She is R handed.  She is retired and enjoys walking with her  and housework.   She notes pain and also limited ROM especially in OH planes    PMH:  (-)     Aggravating factors: Reaching overhead, behind her back    Relieving factors: rest    Functional Deficits:  OH ADLs- reaching a tall cabinet     Patient Goals:  fix shouler    Quality of life: good    Pain  At best pain ratin  At worst pain rating: 3  Location: R Shoulder          Objective     Active Range of Motion   Left Shoulder   Flexion: 160 degrees   Abduction: 155 degrees   External rotation 45°: 60 degrees   Internal rotation BTB: T10     Right Shoulder   Flexion: 90 degrees   Abduction: 85 degrees   External rotation 45°: 35 degrees   Internal rotation BTB: L2     Passive Range of Motion     Right Shoulder   Flexion: 100 degrees   Abduction: 100 degrees   External rotation 45°: WFL  Internal rotation 45°: 45 degrees     Strength/Myotome Testing     Right Shoulder     Planes of Motion   Flexion: 3+   Abduction: 3+   External rotation at 45°: 3+   Internal rotation at 45°: 3+     General Comments:      Shoulder Comments   Cx Screen WFL  Mechanical Assessment of shoulder unremarkable  (+) Sulcus for hypomobility  (+) Impingement testing                Precautions: (-)      Manuals             PROM             Multiplanar GH Mobs Gr 3-4                                       Neuro Re-Ed                                                                                                        Ther Ex             HEP 10            Pulleys F, A             IR Strap Str             Wand Flexion AAROM              Sal VÁSQUEZ             Wall Walk F, A             Sal Ext THALIA                          Ther Activity                                       Gait Training                                       Modalities

## 2024-08-19 ENCOUNTER — OFFICE VISIT (OUTPATIENT)
Dept: INTERNAL MEDICINE CLINIC | Facility: CLINIC | Age: 74
End: 2024-08-19
Payer: COMMERCIAL

## 2024-08-19 VITALS
SYSTOLIC BLOOD PRESSURE: 130 MMHG | HEIGHT: 65 IN | DIASTOLIC BLOOD PRESSURE: 74 MMHG | BODY MASS INDEX: 29.16 KG/M2 | RESPIRATION RATE: 16 BRPM | HEART RATE: 74 BPM | TEMPERATURE: 97.9 F | WEIGHT: 175 LBS | OXYGEN SATURATION: 98 %

## 2024-08-19 DIAGNOSIS — E11.9 TYPE 2 DIABETES MELLITUS WITHOUT COMPLICATION, WITHOUT LONG-TERM CURRENT USE OF INSULIN (HCC): Primary | ICD-10-CM

## 2024-08-19 DIAGNOSIS — R79.89 LOW VITAMIN B12 LEVEL: ICD-10-CM

## 2024-08-19 DIAGNOSIS — M54.50 CHRONIC RIGHT-SIDED LOW BACK PAIN WITHOUT SCIATICA: ICD-10-CM

## 2024-08-19 DIAGNOSIS — E78.1 HYPERTRIGLYCERIDEMIA: ICD-10-CM

## 2024-08-19 DIAGNOSIS — E55.9 VITAMIN D DEFICIENCY: ICD-10-CM

## 2024-08-19 DIAGNOSIS — D47.2 MONOCLONAL GAMMOPATHY: ICD-10-CM

## 2024-08-19 DIAGNOSIS — M85.89 OSTEOPENIA OF MULTIPLE SITES: ICD-10-CM

## 2024-08-19 DIAGNOSIS — G89.29 CHRONIC RIGHT-SIDED LOW BACK PAIN WITHOUT SCIATICA: ICD-10-CM

## 2024-08-19 DIAGNOSIS — M25.511 ACUTE PAIN OF RIGHT SHOULDER: ICD-10-CM

## 2024-08-19 DIAGNOSIS — K59.09 OTHER CONSTIPATION: ICD-10-CM

## 2024-08-19 DIAGNOSIS — D53.9 DEFICIENCY ANEMIA: ICD-10-CM

## 2024-08-19 PROBLEM — R73.03 PREDIABETES: Status: RESOLVED | Noted: 2023-12-20 | Resolved: 2024-08-19

## 2024-08-19 PROBLEM — R73.9 HYPERGLYCEMIA: Status: RESOLVED | Noted: 2021-12-02 | Resolved: 2024-08-19

## 2024-08-19 PROCEDURE — G2211 COMPLEX E/M VISIT ADD ON: HCPCS | Performed by: INTERNAL MEDICINE

## 2024-08-19 PROCEDURE — 99214 OFFICE O/P EST MOD 30 MIN: CPT | Performed by: INTERNAL MEDICINE

## 2024-08-19 NOTE — PROGRESS NOTES
Diabetic Foot Exam    Patient's shoes and socks removed.    Right Foot/Ankle   Right Foot Inspection  Skin Exam: skin normal, skin intact, dry skin, callus and callus. No warmth, no erythema, no maceration, no abnormal color, no pre-ulcer and no ulcer.     Toe Exam: ROM and strength within normal limits.     Sensory   Monofilament testing: intact    Vascular  Capillary refills: < 3 seconds  The right DP pulse is 2+.     Left Foot/Ankle  Left Foot Inspection  Skin Exam: skin normal, skin intact, dry skin and callus. No warmth, no erythema, no maceration, normal color, no pre-ulcer and no ulcer.     Toe Exam: ROM and strength within normal limits.     Sensory   Monofilament testing: intact    Vascular  Capillary refills: < 3 seconds  The left DP pulse is 2+.     Assign Risk Category  No deformity present  No loss of protective sensation  No weak pulses  Risk: 0

## 2024-08-19 NOTE — PATIENT INSTRUCTIONS
Patient was advised to continue present medications. discussed with the patient medications and laboratory data in detail.  Follow-up with me in 4 months or as advised.  If any blood test was ordered please do 1 week prior to next appointment unless advise to get earlier.  If you have any questions please call the office [phone]  Patient Education     Type 2 diabetes   The Basics   Written by the doctors and editors at [facility]   What is type 2 diabetes? -- This is a disorder that disrupts the way the body uses sugar. It is sometimes called type 2 diabetes mellitus.  All of the cells in the body need sugar to work normally. Sugar gets into the cells with the help of a hormone called insulin. Insulin is made by the pancreas, an organ in the belly. If there is not enough insulin, or if cells in the body don't respond normally to insulin, sugar builds up in the blood. That is what happens to people with diabetes.  There are 2 different types of diabetes:   In type 1 diabetes, the pancreas makes little or no insulin.   In type 2 diabetes, the pancreas still makes some insulin, but the cells in the body stop responding normally. Eventually, the pancreas cannot make enough insulin to keep up.  Having excess body weight or obesity increases a person's risk of developing type 2 diabetes. But people without excess body weight can get diabetes, too.  What are the symptoms of type 2 diabetes? -- Type 2 diabetes usually causes no symptoms. When symptoms do happen, they include:   Needing to urinate often   Intense thirst   Blurry vision  Can diabetes lead to other health problems? -- Yes. Type 2 diabetes might not make you feel sick. But if it is not managed, it can lead to serious problems over time, such as:   Heart attacks   Strokes   Kidney disease   Vision problems (or even blindness)   Pain or loss of feeling in the hands and feet   Needing to have fingers, toes, or other body parts removed (amputated)  How do I know  "if I have type 2 diabetes? -- Your doctor or nurse can do a blood test. There are 2 tests that can be used for this. Both involve measuring the amount of sugar in your blood, called your \"blood sugar\" or \"blood glucose\":   One of the tests measures your blood sugar at the time the blood sample is taken. This test is done in the morning. You can't eat or drink anything except water for at least 8 hours before the test.   The other test shows what your average blood sugar has been for the past 2 to 3 months. This blood test is called \"hemoglobin A1C\" or just \"A1C.\" It can be checked at any time of the day, even if you have recently eaten.  How is type 2 diabetes treated? -- The goals of treatment are to manage your blood sugar and lower the risk of future problems that can happen in people with diabetes.  Treatment might include:   Lifestyle changes - This is an important part of managing diabetes. It includes eating healthy foods and getting plenty of physical activity.   Medicines - There are a few medicines that help lower blood sugar. Some people need to take pills that help the body make more insulin or that help insulin do its job. Others need insulin shots.  Depending on what medicines you take, you might need to check your blood sugar regularly at home. But not everyone with type 2 diabetes needs to do this. Your doctor or nurse will tell you if you should be checking your blood sugar, and when and how to do this.  Sometimes, people with type 2 diabetes also need medicines to help prevent problems caused by the disease. For instance, medicines used to lower blood pressure can reduce the chances of a heart attack or stroke.   General medical care - It's also important to take care of other areas of your health. This includes watching your blood pressure and cholesterol levels. You should also get certain vaccines, such as vaccines to protect against the flu and coronavirus disease 2019 (\"COVID-19\"). Some people " also need a vaccine to prevent pneumonia.  Can type 2 diabetes be prevented? -- Yes. To lower your chances of getting type 2 diabetes, the most important thing you can do is eat a healthy diet and get plenty of physical activity. This can help you lose weight if you are overweight. But eating well and being active are also good for your overall health. Even gentle activity, like walking, has benefits.  If you smoke, quitting can also lower your risk of type 2 diabetes. Quitting smoking can be difficult, but your doctor or nurse can help.  All topics are updated as new evidence becomes available and our peer review process is complete.  This topic retrieved from [facility] on: Apr 24, 2024.  Topic 71495 Version 23.0  Release: 32.3.2 - C32.113  © 2024 UpToDate, Inc. and/or its affiliates. All rights reserved.  Consumer Information Use and Disclaimer   Disclaimer: This generalized information is a limited summary of diagnosis, treatment, and/or medication information. It is not meant to be comprehensive and should be used as a tool to help the user understand and/or assess potential diagnostic and treatment options. It does NOT include all information about conditions, treatments, medications, side effects, or risks that may apply to a specific patient. It is not intended to be medical advice or a substitute for the medical advice, diagnosis, or treatment of a health care provider based on the health care provider's examination and assessment of a patient's specific and unique circumstances. Patients must speak with a health care provider for complete information about their health, medical questions, and treatment options, including any risks or benefits regarding use of medications. This information does not endorse any treatments or medications as safe, effective, or approved for treating a specific patient. UpToDate, Inc. and its affiliates disclaim any warranty or liability relating to this information or the use  thereof.The use of this information is governed by the Terms of Use, available at https://www.wolterskluwer.com/en/know/clinical-effectiveness-terms. 2024© UpToDate, Inc. and its affiliates and/or licensors. All rights reserved.  Copyright   © 2024 [employer] and/or its affiliates. All rights reserved.

## 2024-08-19 NOTE — PROGRESS NOTES
Assessment/Plan:    1. Type 2 diabetes mellitus without complication, without long-term current use of insulin (HCC)  -     Basic metabolic panel; Future  -     Basic metabolic panel; Future; Expected date: 11/19/2024  2. Osteopenia of multiple sites  3. Monoclonal gammopathy  4. Acute pain of right shoulder  5. Chronic right-sided low back pain without sciatica  6. Other constipation  7. Vitamin D deficiency  -     Basic metabolic panel; Future  -     Basic metabolic panel; Future; Expected date: 11/19/2024  8. Low vitamin B12 level  -     CBC and differential; Future  -     Vitamin B12; Future  9. Hypertriglyceridemia  10. Deficiency anemia  -     CBC and differential; Future  -     Vitamin B12; Future     Discussion/summary/plan:    Diabetes mellitus diet controlled.  Hemoglobin A1c decreased from 6.6-6.9 advised to continue 1800-calorie ADA diet.  For osteopenia she takes calcium and vitamin D supplement advised for weightbearing exercise.  For monoclonal gammopathy she has been seen on advised to follow with her hematologist.  She has a right shoulder pain seen by orthopedic and injection going for physical therapy.  She has a low back pain for which she also getting physical therapy and occasional disease on meloxicam.  Denies any radicular symptoms.  Vitamin D deficiency resolved vitamin D level 59 advised to continue same dose.  Her last vitamin B12 was 292 then after she has been taking vitamin B12 1000 mcg daily.  Will follow vitamin B12 level.  Her cholesterol 163, triglyceride 87, HDL 47, LDL 99 advised to continue low-cholesterol low carbs diet.  Her hemoglobin was 11.4 then came up to 12 and now 11.7.  Denies blood in the stool or dark-colored stool.  Will follow CBC and check vitamin B12 level.  Patient also has been seen and followed by hematologist.  Her constipation is controlled on present medications.  Advised to maintain hydration and fluid intake as well as fiber intake.        Subjective:  Patient presents for follow-up.      Patient ID: Cris Infante is a 73 y.o. adult.    HPI  73-year-old female patient present for follow-up of her medical problems.  She has right shoulder pain for which she was seen by orthopedic had injection now on physical therapy improving.  She also goes for right-sided low back pain physical therapy as well.  She was seen by her hematologist.    The following portions of the patient's history were reviewed and updated as appropriate:     Past Medical History:  She has a past medical history of Abrasion of knee, bilateral (08/31/2022), Acute pain of right shoulder (08/19/2024), Allergic rhinitis (12/02/2021), Bilateral hearing loss (04/29/2022), Bilateral impacted cerumen (04/29/2022), BMI 31.0-31.9,adult (12/20/2023), BMI 31.0-31.9,adult (05/13/2024), BMI 32.0-32.9,adult (04/29/2022), BMI 33.0-33.9,adult (12/02/2021), Cataract, bilateral, Chronic bilateral low back pain without sciatica (12/08/2022), Chronic right-sided low back pain with right-sided sciatica (12/20/2023), Constipation, Constipation (12/02/2021), COVID-19 (12/04/2023), Deficiency anemia (03/19/2024), Elevated glucose level, Fatigue, History of anemia, Hypertriglyceridemia, Hypertriglyceridemia (12/02/2021), Low vitamin B12 level (05/13/2024), Medicare annual wellness visit, subsequent (12/02/2021), Medicare annual wellness visit, subsequent (12/08/2022), Medicare annual wellness visit, subsequent (12/20/2023), Monoclonal gammopathy (05/13/2024), Obesity, Osteopenia, Osteopenia of multiple sites (04/29/2022), Pain in both knees (08/31/2022), Pain of left heel (12/02/2021), Post-menopausal, Prediabetes (12/20/2023), Rash, Spondylolisthesis of lumbar region (05/13/2024), Type 2 diabetes mellitus without complication, without long-term current use of insulin (HCC) (05/13/2024), Vitamin D deficiency, and Vitamin D deficiency  (2021).,  _______________________________________________________________________  Past Surgical History:   has a past surgical history that includes  section; Cataract extraction, bilateral (); Colonoscopy (2014); Mammo (historical) (2020); and DXA procedure(historical) (2020).,  _______________________________________________________________________  Family History:  family history includes Diabetes in her brother, brother, mother, sister, and sister; No Known Problems in her brother, brother, daughter, daughter, daughter, daughter, daughter, father, maternal aunt, maternal grandfather, maternal grandmother, paternal aunt, paternal grandfather, paternal grandmother, sister, sister, and son; Other in her mother.,  _______________________________________________________________________  Social History:   reports that she has never smoked. She has never used smokeless tobacco. She reports current alcohol use of about 1.0 standard drink of alcohol per week. She reports that she does not use drugs.,  _______________________________________________________________________  Allergies:  has No Known Allergies..  _______________________________________________________________________  Current Outpatient Medications   Medication Sig Dispense Refill    calcium carbonate (OS-FELICITA) 600 MG tablet Take 600 mg by mouth 2 (two) times a day with meals      cholecalciferol (VITAMIN D3) 1,000 units tablet Take 1,000 Units by mouth daily Patient reports taking 2,000 units daily      glucose blood test strip Use 1 each daily as needed Use as instructed      meloxicam (MOBIC) 15 mg tablet Take 1 tablet (15 mg total) by mouth daily as needed for moderate pain 30 tablet 0    Multiple Vitamin (MULTI-VITAMIN DAILY) TABS Take by mouth      psyllium (METAMUCIL) 58.6 % packet Take 1 packet by mouth as needed      senna-docusate sodium (SENOKOT-S) 8.6-50 mg per tablet Take 2 tablets by mouth 2 (two) times a day       "vitamin B-12 (VITAMIN B-12) 1,000 mcg tablet Take 1,000 mcg by mouth daily       No current facility-administered medications for this visit.     _______________________________________________________________________  Review of Systems   Constitutional:  Negative for chills and fever.   HENT:  Negative for congestion, ear pain, hearing loss, nosebleeds, sinus pain, sore throat and trouble swallowing.    Eyes:  Negative for discharge, redness and visual disturbance.   Respiratory:  Negative for cough, chest tightness and shortness of breath.    Cardiovascular:  Negative for chest pain and palpitations.   Gastrointestinal:  Negative for abdominal pain, blood in stool, constipation, diarrhea, nausea and vomiting.   Genitourinary:  Negative for dysuria, flank pain and hematuria.   Musculoskeletal:  Positive for arthralgias (Right shoulder pain) and back pain (Right-sided low back pain). Negative for myalgias and neck pain.   Skin:  Negative for color change and rash.   Neurological:  Negative for dizziness, speech difficulty, weakness and headaches.   Hematological:  Does not bruise/bleed easily.   Psychiatric/Behavioral:  Negative for agitation and behavioral problems.          Objective:  Vitals:    08/19/24 1030   BP: 130/74   BP Location: Left arm   Patient Position: Sitting   Cuff Size: Large   Pulse: 74   Resp: 16   Temp: 97.9 °F (36.6 °C)   TempSrc: Temporal   SpO2: 98%   Weight: 79.4 kg (175 lb)   Height: 5' 5\" (1.651 m)     Body mass index is 29.12 kg/m².     Physical Exam  Vitals and nursing note reviewed.   Constitutional:       General: She is not in acute distress.     Appearance: Normal appearance.   HENT:      Head: Normocephalic and atraumatic.      Right Ear: External ear normal.      Left Ear: External ear normal.      Nose: Nose normal.      Mouth/Throat:      Mouth: Mucous membranes are moist.   Eyes:      General: No scleral icterus.        Right eye: No discharge.         Left eye: No discharge. "      Extraocular Movements: Extraocular movements intact.      Conjunctiva/sclera: Conjunctivae normal.   Cardiovascular:      Rate and Rhythm: Normal rate and regular rhythm.      Pulses: Normal pulses.   Pulmonary:      Effort: Pulmonary effort is normal. No respiratory distress.      Breath sounds: Normal breath sounds. No wheezing.   Abdominal:      General: Bowel sounds are normal.      Palpations: Abdomen is soft.      Tenderness: There is no abdominal tenderness.   Musculoskeletal:         General: Tenderness (Has some tenderness right shoulder and has difficulty lifting up.  Has some tenderness right lower back.) present. Normal range of motion.      Cervical back: Normal range of motion and neck supple. No muscular tenderness.      Right lower leg: No edema.      Left lower leg: No edema.   Skin:     General: Skin is warm.      Findings: No rash.   Neurological:      General: No focal deficit present.      Mental Status: She is alert and oriented to person, place, and time.      Motor: No weakness.   Psychiatric:         Mood and Affect: Mood normal.         Behavior: Behavior normal.

## 2024-08-21 ENCOUNTER — OFFICE VISIT (OUTPATIENT)
Dept: PHYSICAL THERAPY | Facility: CLINIC | Age: 74
End: 2024-08-21
Payer: COMMERCIAL

## 2024-08-21 DIAGNOSIS — M75.121 NONTRAUMATIC COMPLETE TEAR OF RIGHT ROTATOR CUFF: ICD-10-CM

## 2024-08-21 DIAGNOSIS — M25.511 CHRONIC RIGHT SHOULDER PAIN: Primary | ICD-10-CM

## 2024-08-21 DIAGNOSIS — M75.101 ROTATOR CUFF TEAR ARTHROPATHY OF RIGHT SHOULDER: ICD-10-CM

## 2024-08-21 DIAGNOSIS — G89.29 CHRONIC RIGHT SHOULDER PAIN: Primary | ICD-10-CM

## 2024-08-21 DIAGNOSIS — M12.811 ROTATOR CUFF TEAR ARTHROPATHY OF RIGHT SHOULDER: ICD-10-CM

## 2024-08-21 PROCEDURE — 97110 THERAPEUTIC EXERCISES: CPT

## 2024-08-21 NOTE — PROGRESS NOTES
"Daily Note     Today's date: 2024  Patient name: Cris Infante  : 1950  MRN: [id number]  Referring provider: Juanjose Lloyd DO  Dx:   Encounter Diagnosis     ICD-10-CM    1. Chronic right shoulder pain  M25.511     G89.29       2. Nontraumatic complete tear of right rotator cuff  M75.121       3. Rotator cuff tear arthropathy of right shoulder  M75.101     M12.811                      Subjective: R shoulder feels tight, limited active motion.      Objective: See treatment diary below      Assessment: Tolerated treatment fair. Patient is guarded during PROM. She requires continues guidance with AA tasks to ensure good form/efficacy. Good ROM into abd and flex with the pulleys, less with AA tasks. Continued PT would be beneficial to improve function.           Plan: Continue per plan of care.       Precautions: (-)      Manuals            PROM  Guarded            Multiplanar GH Mobs Gr 3-4                                       Neuro Re-Ed                                                                                                        Ther Ex             HEP 10            Pulleys F, A  2'/2'           IR Strap Str  5x5\" Good for form           Wand Flexion AAROM  x10           Wand Abd AAROM  x10           Wall Walk F, A  10x ea           Wand Ext AAROM  10x                        Ther Activity                                       Gait Training                                       Modalities                                            "

## 2024-08-23 ENCOUNTER — OFFICE VISIT (OUTPATIENT)
Dept: PHYSICAL THERAPY | Facility: CLINIC | Age: 74
End: 2024-08-23
Payer: COMMERCIAL

## 2024-08-23 DIAGNOSIS — M25.511 CHRONIC RIGHT SHOULDER PAIN: Primary | ICD-10-CM

## 2024-08-23 DIAGNOSIS — M75.121 NONTRAUMATIC COMPLETE TEAR OF RIGHT ROTATOR CUFF: ICD-10-CM

## 2024-08-23 DIAGNOSIS — M12.811 ROTATOR CUFF TEAR ARTHROPATHY OF RIGHT SHOULDER: ICD-10-CM

## 2024-08-23 DIAGNOSIS — M75.101 ROTATOR CUFF TEAR ARTHROPATHY OF RIGHT SHOULDER: ICD-10-CM

## 2024-08-23 DIAGNOSIS — G89.29 CHRONIC RIGHT SHOULDER PAIN: Primary | ICD-10-CM

## 2024-08-23 PROCEDURE — 97110 THERAPEUTIC EXERCISES: CPT | Performed by: PHYSICAL THERAPIST

## 2024-08-23 NOTE — PROGRESS NOTES
"Daily Note     Today's date: 2024  Patient name: Cris Infante  : 1950  MRN: [id number]  Referring provider: Juanjose Lloyd DO  Dx:   Encounter Diagnosis     ICD-10-CM    1. Chronic right shoulder pain  M25.511     G89.29       2. Nontraumatic complete tear of right rotator cuff  M75.121       3. Rotator cuff tear arthropathy of right shoulder  M75.101     M12.811                      Subjective: Pt compliant with HEP      Objective: See treatment diary below      Assessment: Tolerated treatment well. Patient would benefit from continued PT      Plan: Continue per plan of care.      Precautions: (-)      Manuals           PROM  Guarded            Multiplanar GH Mobs Gr 3-4   8'                                    Neuro Re-Ed                                                                                                        Ther Ex             HEP 10            Pulleys F, A  2'/2' 3'/3'          IR Strap Str  5x5\" Good for form :10/10          Wand Flexion AAROM  x10 10x          Wand Abd AAROM  x10 10x          Wall Walk F, A  10x ea           Wand Ext AAROM  10x 10x          Wand ER   10x          Ther Activity                                       Gait Training                                       Modalities                                              "

## 2024-08-28 ENCOUNTER — OFFICE VISIT (OUTPATIENT)
Dept: PHYSICAL THERAPY | Facility: CLINIC | Age: 74
End: 2024-08-28
Payer: COMMERCIAL

## 2024-08-28 DIAGNOSIS — G89.29 CHRONIC RIGHT SHOULDER PAIN: Primary | ICD-10-CM

## 2024-08-28 DIAGNOSIS — M12.811 ROTATOR CUFF TEAR ARTHROPATHY OF RIGHT SHOULDER: ICD-10-CM

## 2024-08-28 DIAGNOSIS — M25.511 CHRONIC RIGHT SHOULDER PAIN: Primary | ICD-10-CM

## 2024-08-28 DIAGNOSIS — M75.121 NONTRAUMATIC COMPLETE TEAR OF RIGHT ROTATOR CUFF: ICD-10-CM

## 2024-08-28 DIAGNOSIS — M75.101 ROTATOR CUFF TEAR ARTHROPATHY OF RIGHT SHOULDER: ICD-10-CM

## 2024-08-28 PROCEDURE — 97110 THERAPEUTIC EXERCISES: CPT | Performed by: PHYSICAL THERAPIST

## 2024-08-28 NOTE — PROGRESS NOTES
"Daily Note     Today's date: 2024  Patient name: Cris Infante  : 1950  MRN: [id number]  Referring provider: Juanjose Lloyd DO  Dx:   Encounter Diagnosis     ICD-10-CM    1. Chronic right shoulder pain  M25.511     G89.29       2. Nontraumatic complete tear of right rotator cuff  M75.121       3. Rotator cuff tear arthropathy of right shoulder  M75.101     M12.811                      Subjective: Pt wished to hold on supine exercises due to getting hair done      Objective: See treatment diary below      Assessment: Tolerated treatment well. Patient would benefit from continued PT      Plan: Continue per plan of care.      Precautions: (-)      Manuals          PROM  Guarded            Multiplanar GH Mobs Gr 3-4   8'                                    Neuro Re-Ed                                                                                                        Ther Ex             HEP 10            Pulleys F, A  2'/2' 3'/3' 3'/3'         IR Strap Str  5x5\" Good for form :10/10 :10/10         Wand Flexion AAROM  x10 10x Seated 20         Wand Abd AAROM  x10 10x          Wall Walk F, A  10x ea  10x ea         Wand Ext AAROM  10x 10x 10x         Seated Scaption    20x                      Wand ER   10x Table slides         Ther Activity                                       Gait Training                                       Modalities                                                "

## 2024-08-29 LAB
LEFT EYE DIABETIC RETINOPATHY: NORMAL
RIGHT EYE DIABETIC RETINOPATHY: NORMAL

## 2024-08-30 ENCOUNTER — OFFICE VISIT (OUTPATIENT)
Dept: PHYSICAL THERAPY | Facility: CLINIC | Age: 74
End: 2024-08-30
Payer: COMMERCIAL

## 2024-08-30 DIAGNOSIS — M25.511 CHRONIC RIGHT SHOULDER PAIN: Primary | ICD-10-CM

## 2024-08-30 DIAGNOSIS — M75.121 NONTRAUMATIC COMPLETE TEAR OF RIGHT ROTATOR CUFF: ICD-10-CM

## 2024-08-30 DIAGNOSIS — G89.29 CHRONIC RIGHT SHOULDER PAIN: Primary | ICD-10-CM

## 2024-08-30 DIAGNOSIS — M75.101 ROTATOR CUFF TEAR ARTHROPATHY OF RIGHT SHOULDER: ICD-10-CM

## 2024-08-30 DIAGNOSIS — M12.811 ROTATOR CUFF TEAR ARTHROPATHY OF RIGHT SHOULDER: ICD-10-CM

## 2024-08-30 PROCEDURE — 97140 MANUAL THERAPY 1/> REGIONS: CPT | Performed by: PHYSICAL THERAPIST

## 2024-08-30 PROCEDURE — 97110 THERAPEUTIC EXERCISES: CPT | Performed by: PHYSICAL THERAPIST

## 2024-08-30 NOTE — PROGRESS NOTES
"Daily Note     Today's date: 2024  Patient name: Cris Infante  : 1950  MRN: [id number]  Referring provider: Juanjose Lloyd DO  Dx:   Encounter Diagnosis     ICD-10-CM    1. Chronic right shoulder pain  M25.511     G89.29       2. Nontraumatic complete tear of right rotator cuff  M75.121       3. Rotator cuff tear arthropathy of right shoulder  M75.101     M12.811                      Subjective: Doing \"well\"      Objective: See treatment diary below      Assessment: Tolerated treatment well. Patient  able to advance AROM exercises      Plan: Continue per plan of care.      Precautions: (-)      Manuals         PROM  Guarded    10'        Multiplanar GH Mobs Gr 3-4   8'                                    Neuro Re-Ed                                                                                                        Ther Ex             HEP 10            Pulleys F, A  2'/2' 3'/3' 3'/3' 3'/3'        IR Strap Str  5x5\" Good for form :10/10 :10/10 :10/10        Wand Flexion AAROM  x10 10x Seated 20         Wand Abd AAROM  x10 10x          Wall Walk F, A  10x ea  10x ea 10 ea        Wand Ext AAROM  10x 10x 10x 10x        Seated Scaption    20x 20x        Supine Flexion AROM     20x        SL Abd AROM     20x        SL ER AROM     20x                     Wand ER   10x Table slides         Ther Activity                                       Gait Training                                       Modalities                                                  "

## 2024-09-04 ENCOUNTER — OFFICE VISIT (OUTPATIENT)
Dept: PHYSICAL THERAPY | Facility: CLINIC | Age: 74
End: 2024-09-04
Payer: COMMERCIAL

## 2024-09-04 ENCOUNTER — OFFICE VISIT (OUTPATIENT)
Dept: INTERNAL MEDICINE CLINIC | Facility: CLINIC | Age: 74
End: 2024-09-04
Payer: COMMERCIAL

## 2024-09-04 ENCOUNTER — TELEPHONE (OUTPATIENT)
Age: 74
End: 2024-09-04

## 2024-09-04 VITALS
WEIGHT: 175 LBS | OXYGEN SATURATION: 99 % | BODY MASS INDEX: 29.16 KG/M2 | HEIGHT: 65 IN | HEART RATE: 83 BPM | DIASTOLIC BLOOD PRESSURE: 86 MMHG | SYSTOLIC BLOOD PRESSURE: 130 MMHG

## 2024-09-04 DIAGNOSIS — M75.121 NONTRAUMATIC COMPLETE TEAR OF RIGHT ROTATOR CUFF: ICD-10-CM

## 2024-09-04 DIAGNOSIS — M25.511 CHRONIC RIGHT SHOULDER PAIN: Primary | ICD-10-CM

## 2024-09-04 DIAGNOSIS — R42 DIZZINESS: ICD-10-CM

## 2024-09-04 DIAGNOSIS — G89.29 CHRONIC RIGHT SHOULDER PAIN: Primary | ICD-10-CM

## 2024-09-04 DIAGNOSIS — M12.811 ROTATOR CUFF TEAR ARTHROPATHY OF RIGHT SHOULDER: ICD-10-CM

## 2024-09-04 DIAGNOSIS — J20.9 ACUTE INFECTIVE TRACHEOBRONCHITIS: Primary | ICD-10-CM

## 2024-09-04 DIAGNOSIS — M75.101 ROTATOR CUFF TEAR ARTHROPATHY OF RIGHT SHOULDER: ICD-10-CM

## 2024-09-04 PROCEDURE — 97112 NEUROMUSCULAR REEDUCATION: CPT

## 2024-09-04 PROCEDURE — 97110 THERAPEUTIC EXERCISES: CPT

## 2024-09-04 PROCEDURE — G2211 COMPLEX E/M VISIT ADD ON: HCPCS | Performed by: INTERNAL MEDICINE

## 2024-09-04 PROCEDURE — 99213 OFFICE O/P EST LOW 20 MIN: CPT | Performed by: INTERNAL MEDICINE

## 2024-09-04 PROCEDURE — 1160F RVW MEDS BY RX/DR IN RCRD: CPT | Performed by: INTERNAL MEDICINE

## 2024-09-04 PROCEDURE — 1159F MED LIST DOCD IN RCRD: CPT | Performed by: INTERNAL MEDICINE

## 2024-09-04 RX ORDER — AZITHROMYCIN 250 MG/1
TABLET, FILM COATED ORAL
Qty: 6 TABLET | Refills: 0 | Status: SHIPPED | OUTPATIENT
Start: 2024-09-04 | End: 2024-09-09

## 2024-09-04 NOTE — PATIENT INSTRUCTIONS
Patient Education     Upper Respiratory Infection ED   General Information   You came to the Emergency Department (ED) for an upper respiratory infection or URI. A URI can affect your nose, throat, ears, and sinuses. A virus is the cause of almost all URIs and antibiotics will not help you feel better more quickly. The common cold is an example of a viral URI.  URIs are easy to spread from person to person, most often through coughing or sneezing. A URI will almost always get better in a week or two without any treatment.  What care is needed at home?   Call your regular doctor to let them know you were in the ED. Make a follow-up appointment if you were told to.  If you smoke, try to quit. Your doctor or nurse can help.  Drink lots of fluids like water, juice, or broth. This will help replace any fluids lost if you have a runny nose or fever. Warm tea or soup can help soothe a sore throat.  If the air in your home feels dry, use a cool mist humidifier. This can help a stuffy nose and make it easier to breathe.  You can also use saline nose drops or spray to relieve stuffiness.  If you decide to take over-the-counter cough or cold medicines, follow the directions on the label carefully. Be sure you do not take more than 1 medicine that contains acetaminophen. Also, if you have a heart problem or high blood pressure, check with your doctor before you take any of these medicines.  Wash your hands often. Cough or sneeze into a tissue or your elbow instead of your hands. When around others, you might also want to wear a face mask. These steps can help keep others around you healthy.  When do I need to get emergency help?   Return to the ED if:   You have trouble breathing when talking or sitting still.  When do I need to call the doctor?   You have a fever of 100.4°F (38°C) or higher for several days, chills, a very bad sore throat, or ear or sinus pain.  You develop a new fever after several days of feeling the same or  improving.  You develop chest pain when you cough.  You have a cough that lasts more than 10 days.  You cough up blood.  You have new or worsening symptoms.  Last Reviewed Date   2022-02-01  Consumer Information Use and Disclaimer   This generalized information is a limited summary of diagnosis, treatment, and/or medication information. It is not meant to be comprehensive and should be used as a tool to help the user understand and/or assess potential diagnostic and treatment options. It does NOT include all information about conditions, treatments, medications, side effects, or risks that may apply to a specific patient. It is not intended to be medical advice or a substitute for the medical advice, diagnosis, or treatment of a health care provider based on the health care provider's examination and assessment of a patient’s specific and unique circumstances. Patients must speak with a health care provider for complete information about their health, medical questions, and treatment options, including any risks or benefits regarding use of medications. This information does not endorse any treatments or medications as safe, effective, or approved for treating a specific patient. [employer] and its affiliates disclaim any warranty or liability relating to this information or the use thereof. The use of this information is governed by the Terms of Use, available at https://www.woltersSentonsuwer.com/en/know/clinical-effectiveness-terms   Copyright   Copyright © 2024 [employer] and its affiliates and/or licensors. All rights reserved.

## 2024-09-04 NOTE — PROGRESS NOTES
"Daily Note     Today's date: 2024  Patient name: Cris Infante  : 1950  MRN: [id number]  Referring provider: Juanjose Lloyd DO  Dx:   Encounter Diagnosis     ICD-10-CM    1. Chronic right shoulder pain  M25.511     G89.29       2. Nontraumatic complete tear of right rotator cuff  M75.121       3. Rotator cuff tear arthropathy of right shoulder  M75.101     M12.811             Start Time: 930  Stop Time: 1000  Total time in clinic (min): 30 minutes    Subjective: Pt reports continued R shoulder pain of 8/10 today. Reports 5/10 pain by EOS.      Objective: See treatment diary below      Assessment: Tolerated treatment well. Requires some v/t/c for appropriate form for exercises. Continue to progress as tolerated.       Plan: Continue per plan of care.      Precautions: (-)      Manuals  9       PROM  Guarded    10' 8'       Multiplanar GH Mobs Gr 3-4   8'                                    Neuro Re-Ed                                                                                                        Ther Ex             HEP 10            Pulleys F, A  2'/2' 3'/3' 3'/3' 3'/3' 3'/3'       IR Strap Str  5x5\" Good for form :10/10 :10/10 :10/10 :10/10       Wand Flexion AAROM  x10 10x Seated 20         Wand Abd AAROM  x10 10x          Wall Walk F, A  10x ea  10x ea 10 ea 10e       Wand Ext AAROM  10x 10x 10x 10x 10x       Seated Scaption    20x 20x        Supine Flexion AROM     20x 20x       SL Abd AROM     20x 20x       SL ER AROM     20x 20x                    Wand ER   10x Table slides         Ther Activity                                       Gait Training                                       Modalities                                                  "

## 2024-09-04 NOTE — ASSESSMENT & PLAN NOTE
Came in for last 4 days symptoms increasing severity some sore throat intermittent with coughing scanty yellow sputum and some headache patient claims about 5 days ago when patient was at physical therapy something hit in the head during the physical therapy since then some intermittent headache and intermittent dizziness for now DDD dizziness resolved but persistent sore throat coughing no fever chills no chest pain no difficulty breathing    No chest pain    No difficulty breathing none    No nausea vomiting diarrhea    No high fever    Will treat with Z-Yves Zithromax to use as directed    Over-the-counter Robitussin expectorant if the symptom gets worse should go to the emergency room

## 2024-09-04 NOTE — ASSESSMENT & PLAN NOTE
Patient complaining of some headache dizziness for last 5 days since something fell on her head during the physical therapy intermittent but as per patient is much improved especially dizziness at present time asymptomatic denies any associate symptoms of nausea and but for now being treated for acute infective tracheobronchitis    Patient reassured advised hopefully should be resolved with the resolution of acute infective tracheobronchitis but if persistent symptoms should call us we will do further workup including the referring neurology and CT of the head and if needed MRI of the brain
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
## 2024-09-05 ENCOUNTER — OFFICE VISIT (OUTPATIENT)
Dept: PHYSICAL THERAPY | Facility: CLINIC | Age: 74
End: 2024-09-05
Payer: COMMERCIAL

## 2024-09-05 DIAGNOSIS — M25.511 CHRONIC RIGHT SHOULDER PAIN: Primary | ICD-10-CM

## 2024-09-05 DIAGNOSIS — G89.29 CHRONIC RIGHT SHOULDER PAIN: Primary | ICD-10-CM

## 2024-09-05 DIAGNOSIS — M75.101 ROTATOR CUFF TEAR ARTHROPATHY OF RIGHT SHOULDER: ICD-10-CM

## 2024-09-05 DIAGNOSIS — M12.811 ROTATOR CUFF TEAR ARTHROPATHY OF RIGHT SHOULDER: ICD-10-CM

## 2024-09-05 DIAGNOSIS — M75.121 NONTRAUMATIC COMPLETE TEAR OF RIGHT ROTATOR CUFF: ICD-10-CM

## 2024-09-05 PROCEDURE — 97110 THERAPEUTIC EXERCISES: CPT | Performed by: PHYSICAL THERAPIST

## 2024-09-05 PROCEDURE — 97112 NEUROMUSCULAR REEDUCATION: CPT | Performed by: PHYSICAL THERAPIST

## 2024-09-05 NOTE — PROGRESS NOTES
"Daily Note     Today's date: 2024  Patient name: Cris Infante  : 1950  MRN: [id number]  Referring provider: Juanjose Lloyd DO  Dx:   Encounter Diagnosis     ICD-10-CM    1. Chronic right shoulder pain  M25.511     G89.29       2. Nontraumatic complete tear of right rotator cuff  M75.121       3. Rotator cuff tear arthropathy of right shoulder  M75.101     M12.811                        Subjective: Pt reports that she is doing okay.  Offers no new complaints at this time.       Objective: See treatment diary below      Assessment: Minor cues to relax during PROM ER/IR.  AROM supine activities progressing positively, if possible consider re-entry AROM in upright position to challenge this.        Plan: Continue per plan of care.      Precautions: (-)      Manuals       PROM  Guarded    10' 8' 8 min      Multiplanar GH Mobs Gr 3-4   8'                                    Neuro Re-Ed                                                                                                        Ther Ex             HEP 10            Pulleys F, A  2'/2' 3'/3' 3'/3' 3'/3' 3'/3' 3/3      IR Strap Str  5x5\" Good for form :10/10 :10/10 :10/10 :10/10 10\" x 10      Wand Flexion AAROM  x10 10x Seated 20         Wand Abd AAROM  x10 10x          Wall Walk F, A  10x ea  10x ea 10 ea 10e 10x      Wand Ext AAROM  10x 10x 10x 10x 10x 10x      Seated Scaption    20x 20x        Supine Flexion AROM     20x 20x 20x      SL Abd AROM     20x 20x 20x      SL ER AROM     20x 20x 20x                   Wand ER   10x Table slides         Ther Activity                                       Gait Training                                       Modalities                                                  "

## 2024-09-06 ENCOUNTER — APPOINTMENT (OUTPATIENT)
Dept: PHYSICAL THERAPY | Facility: CLINIC | Age: 74
End: 2024-09-06
Payer: COMMERCIAL

## 2024-09-06 ENCOUNTER — HOSPITAL ENCOUNTER (OUTPATIENT)
Dept: RADIOLOGY | Age: 74
Discharge: HOME | End: 2024-09-06
Payer: COMMERCIAL

## 2024-09-06 VITALS — HEIGHT: 65 IN | BODY MASS INDEX: 28.66 KG/M2 | WEIGHT: 172 LBS

## 2024-09-06 DIAGNOSIS — Z12.31 VISIT FOR SCREENING MAMMOGRAM: ICD-10-CM

## 2024-09-06 PROCEDURE — 77063 BREAST TOMOSYNTHESIS BI: CPT

## 2024-09-06 PROCEDURE — 77067 SCR MAMMO BI INCL CAD: CPT

## 2024-09-09 ENCOUNTER — OFFICE VISIT (OUTPATIENT)
Dept: PHYSICAL THERAPY | Facility: CLINIC | Age: 74
End: 2024-09-09
Payer: COMMERCIAL

## 2024-09-09 DIAGNOSIS — M75.121 NONTRAUMATIC COMPLETE TEAR OF RIGHT ROTATOR CUFF: ICD-10-CM

## 2024-09-09 DIAGNOSIS — G89.29 CHRONIC RIGHT SHOULDER PAIN: Primary | ICD-10-CM

## 2024-09-09 DIAGNOSIS — M25.511 CHRONIC RIGHT SHOULDER PAIN: Primary | ICD-10-CM

## 2024-09-09 DIAGNOSIS — M12.811 ROTATOR CUFF TEAR ARTHROPATHY OF RIGHT SHOULDER: ICD-10-CM

## 2024-09-09 DIAGNOSIS — M75.101 ROTATOR CUFF TEAR ARTHROPATHY OF RIGHT SHOULDER: ICD-10-CM

## 2024-09-09 PROCEDURE — 97110 THERAPEUTIC EXERCISES: CPT | Performed by: PHYSICAL THERAPIST

## 2024-09-09 PROCEDURE — 97140 MANUAL THERAPY 1/> REGIONS: CPT | Performed by: PHYSICAL THERAPIST

## 2024-09-09 NOTE — PROGRESS NOTES
PT ReEvalution    Today's date: 2024  Patient name: Cris Infante  : 1950  MRN: [id number]  Referring provider: Juanjose Lloyd DO  Dx:   Encounter Diagnosis     ICD-10-CM    1. Chronic right shoulder pain  M25.511     G89.29       2. Nontraumatic complete tear of right rotator cuff  M75.121       3. Rotator cuff tear arthropathy of right shoulder  M75.101     M12.811                      Assessment  Impairments: abnormal or restricted ROM, activity intolerance, impaired physical strength, lacks appropriate home exercise program and pain with function    Assessment details: Patient has made excellent improvements in PROM and some improvement in AROM.  She continues to have weakness and limited use for OH ADLs.  She would benefit from continued therapy to achieve maximal recovery.  Thank you for this pleasant referral.    Understanding of Dx/Px/POC: good     Prognosis: good    Goals  ST-6 weeks- Met  1.  Patient to be independent with HEP.  2.  Decrease pain at least 2 subjective levels.      LT-12 weeks.- Partially Met     1.   Patient to voice comfort with self management of condition.  2.  75% or > decreased pain.  3.  75% or > decreased functional deficits.  4.  Normalize AROM of all deficit planes.  5.  Normalize strength.      Plan  Patient would benefit from: skilled PT  Referral necessary: No  Planned modality interventions: cryotherapy    Planned therapy interventions: IADL retraining, joint mobilization, manual therapy, motor coordination training, neuromuscular re-education, patient education, postural training, self care, strengthening, stretching, therapeutic activities, therapeutic exercise, home exercise program, flexibility, ADL training, balance and body mechanics training    Frequency: 2x week  Duration in weeks: 8  Treatment plan discussed with: patient        Subjective Evaluation    History of Present Illness  Onset date: > 1 month ago.  Mechanism of injury: Chief  "Complaint:  R shoulder pain    History:  Patient notes insidious onset of R shoulder pain beginning around a month ago.  She was seen by ortho, had xrays and an injection.  She notes some improvement since this injection.  She is R handed.  She is retired and enjoys walking with her  and housework.   She notes pain and also limited ROM especially in OH planes    PMH:  (-)     Aggravating factors: Reaching overhead, behind her back    Relieving factors: rest    Functional Deficits:  OH ADLs- reaching a tall cabinet     Patient Goals:  fix shouler    Quality of life: good    Pain  At best pain ratin  At worst pain rating: 3  Location: R Shoulder          Objective     Active Range of Motion   Left Shoulder   Flexion: 160 degrees   Abduction: 155 degrees   External rotation 45°: 60 degrees   Internal rotation BTB: T10     Right Shoulder   Flexion: 120 degrees   Abduction: 90 degrees   External rotation 45°: WFL  Internal rotation BTB: L2     Passive Range of Motion     Right Shoulder   Flexion: WFL  Abduction: WFL  External rotation 45°: WFL  Internal rotation 45°: 45 degrees     Strength/Myotome Testing     Right Shoulder     Planes of Motion   Flexion: 3+   Abduction: 3+   External rotation at 45°: 3+   Internal rotation at 45°: 3+     General Comments:      Shoulder Comments   Cx Screen WFL  Mechanical Assessment of shoulder unremarkable  (-) Sulcus for hypomobility  (+) Impingement testing                Precautions: (-)  Manuals  9 9     PROM  Guarded    10' 8' 8 min C     Multiplanar GH Mobs Gr 3-4   8'          RS balanced position        C                  Neuro Re-Ed                                                                                                        Ther Ex             HEP 10            Pulleys F, A  2'/2' 3'/3' 3'/3' 3'/3' 3'/3' 3/3 3'/3'     IR Strap Str  5x5\" Good for form :10/10 :10/10 :10/10 :10/10 10\" x 10 :10/10     Wand Flexion AAROM  x10 " 10x Seated 20         Wand Abd AAROM  x10 10x          Wall Walk F, A  10x ea  10x ea 10 ea 10e 10x 10x     Wand Ext AAROM  10x 10x 10x 10x 10x 10x 10x     Seated Scaption    20x 20x   20x     Supine Flexion AROM     20x 20x 20x 20x     SL Abd AROM     20x 20x 20x 20x     SL ER AROM     20x 20x 20x 20x                  Wand ER   10x Table slides         Ther Activity                                       Gait Training                                       Modalities

## 2024-09-11 ENCOUNTER — OFFICE VISIT (OUTPATIENT)
Dept: PHYSICAL THERAPY | Facility: CLINIC | Age: 74
End: 2024-09-11
Payer: COMMERCIAL

## 2024-09-11 DIAGNOSIS — M75.101 ROTATOR CUFF TEAR ARTHROPATHY OF RIGHT SHOULDER: ICD-10-CM

## 2024-09-11 DIAGNOSIS — M75.121 NONTRAUMATIC COMPLETE TEAR OF RIGHT ROTATOR CUFF: ICD-10-CM

## 2024-09-11 DIAGNOSIS — G89.29 CHRONIC RIGHT SHOULDER PAIN: Primary | ICD-10-CM

## 2024-09-11 DIAGNOSIS — M12.811 ROTATOR CUFF TEAR ARTHROPATHY OF RIGHT SHOULDER: ICD-10-CM

## 2024-09-11 DIAGNOSIS — M25.511 CHRONIC RIGHT SHOULDER PAIN: Primary | ICD-10-CM

## 2024-09-11 PROCEDURE — 97140 MANUAL THERAPY 1/> REGIONS: CPT

## 2024-09-11 PROCEDURE — 97110 THERAPEUTIC EXERCISES: CPT

## 2024-09-11 NOTE — PROGRESS NOTES
"Daily Note     Today's date: 2024  Patient name: Cris Infante  : 1950  MRN: [id number]  Referring provider: Juanjose Lloyd DO  Dx:   Encounter Diagnosis     ICD-10-CM    1. Chronic right shoulder pain  M25.511     G89.29       2. Nontraumatic complete tear of right rotator cuff  M75.121       3. Rotator cuff tear arthropathy of right shoulder  M75.101     M12.811           Start Time: 1000  Stop Time: 1032  Total time in clinic (min): 32 minutes    Subjective: Pt reported that she still has difficulty with reaching OH but noted that it has improved a little.       Objective: See treatment diary below      Assessment: Tolerated treatment well. Program started with the pulleys. Manual therapy focused on increasing R shoulder PROM (guarded). Pt was challenged with all exercises and performed exercises at a slow pace. Pt required VC in order to decrease UT compensatory movements. Patient would benefit from continued PT      Plan: Continue per plan of care.      Precautions: (-)  Manuals  9 9    PROM  Guarded    10' 8' 8 min C MS    Multiplanar GH Mobs Gr 3-4   8'          RS balanced position        C                  Neuro Re-Ed                                                                                                        Ther Ex             HEP 10            Pulleys F, A  2'/2' 3'/3' 3'/3' 3'/3' 3'/3' 3/3 3'/3' 3'/3'    IR Strap Str  5x5\" Good for form :10/10 :10/10 :10/10 :10/10 10\" x 10 :10/10 10\"x10    Wand Flexion AAROM  x10 10x Seated 20         Wand Abd AAROM  x10 10x          Wall Walk F, A  10x ea  10x ea 10 ea 10e 10x 10x 10x each    Wand Ext AAROM  10x 10x 10x 10x 10x 10x 10x 10x    Seated Scaption    20x 20x   20x 20x    Supine Flexion AROM     20x 20x 20x 20x 20x    SL Abd AROM     20x 20x 20x 20x 20x    SL ER AROM     20x 20x 20x 20x 20x                 Wand ER   10x Table slides         Ther Activity                                     "   Gait Training                                       Modalities

## 2024-09-16 ENCOUNTER — OFFICE VISIT (OUTPATIENT)
Dept: PHYSICAL THERAPY | Facility: CLINIC | Age: 74
End: 2024-09-16
Payer: COMMERCIAL

## 2024-09-16 DIAGNOSIS — M75.101 ROTATOR CUFF TEAR ARTHROPATHY OF RIGHT SHOULDER: ICD-10-CM

## 2024-09-16 DIAGNOSIS — G89.29 CHRONIC RIGHT SHOULDER PAIN: Primary | ICD-10-CM

## 2024-09-16 DIAGNOSIS — M25.511 CHRONIC RIGHT SHOULDER PAIN: Primary | ICD-10-CM

## 2024-09-16 DIAGNOSIS — M12.811 ROTATOR CUFF TEAR ARTHROPATHY OF RIGHT SHOULDER: ICD-10-CM

## 2024-09-16 DIAGNOSIS — M75.121 NONTRAUMATIC COMPLETE TEAR OF RIGHT ROTATOR CUFF: ICD-10-CM

## 2024-09-16 PROCEDURE — 97140 MANUAL THERAPY 1/> REGIONS: CPT

## 2024-09-16 PROCEDURE — 97110 THERAPEUTIC EXERCISES: CPT

## 2024-09-16 NOTE — PROGRESS NOTES
"Daily Note     Today's date: 2024  Patient name: Cris Infante  : 1950  MRN: [id number]  Referring provider: Juanjose Lloyd DO  Dx:   Encounter Diagnosis     ICD-10-CM    1. Chronic right shoulder pain  M25.511     G89.29       2. Nontraumatic complete tear of right rotator cuff  M75.121       3. Rotator cuff tear arthropathy of right shoulder  M75.101     M12.811                      Subjective: Pt reports that she continues to have difficulty with overhead reaching with her R shoulder due to tightness present.       Objective: See treatment diary below      Assessment: Tolerated treatment well. Program started with the pulleys. She required moderate verbal and tactile cues throughout today to ensure correct form. Guarding was also present with manuals, demonstrating a tight end feel in all directions. Patient would benefit from continued PT      Plan: Continue per plan of care.      Precautions: (-)  Manuals    PROM  Guarded    10' 8' 8 min C MS 8'   Multiplanar GH Mobs Gr 3-4   8'          RS balanced position        C                  Neuro Re-Ed                                                                                                        Ther Ex             HEP 10            Pulleys F, A  2'/2' 3'/3' 3'/3' 3'/3' 3'/3' 3/3 3'/3' 3'/3' 3'/3'   IR Strap Str  5x5\" Good for form :10/10 :10/10 :10/10 :10/10 10\" x 10 :10/10 10\"x10 10\"x10   Wand Flexion AAROM  x10 10x Seated 20         Wand Abd AAROM  x10 10x          Wall Walk F, A  10x ea  10x ea 10 ea 10e 10x 10x 10x each 10x ea   Wand Ext AAROM  10x 10x 10x 10x 10x 10x 10x 10x 10x   Seated Scaption    20x 20x   20x 20x 20x   Supine Flexion AROM     20x 20x 20x 20x 20x 20x   SL Abd AROM     20x 20x 20x 20x 20x 20x   SL ER AROM     20x 20x 20x 20x 20x 20x                Wand ER   10x Table slides         Ther Activity                                       Gait Training                           "             Modalities

## 2024-09-18 ENCOUNTER — OFFICE VISIT (OUTPATIENT)
Dept: PHYSICAL THERAPY | Facility: CLINIC | Age: 74
End: 2024-09-18
Payer: COMMERCIAL

## 2024-09-18 DIAGNOSIS — M25.511 CHRONIC RIGHT SHOULDER PAIN: Primary | ICD-10-CM

## 2024-09-18 DIAGNOSIS — M12.811 ROTATOR CUFF TEAR ARTHROPATHY OF RIGHT SHOULDER: ICD-10-CM

## 2024-09-18 DIAGNOSIS — G89.29 CHRONIC RIGHT SHOULDER PAIN: Primary | ICD-10-CM

## 2024-09-18 DIAGNOSIS — M75.121 NONTRAUMATIC COMPLETE TEAR OF RIGHT ROTATOR CUFF: ICD-10-CM

## 2024-09-18 DIAGNOSIS — M75.101 ROTATOR CUFF TEAR ARTHROPATHY OF RIGHT SHOULDER: ICD-10-CM

## 2024-09-18 PROCEDURE — 97110 THERAPEUTIC EXERCISES: CPT | Performed by: PHYSICAL THERAPIST

## 2024-09-18 NOTE — PROGRESS NOTES
"Daily Note     Today's date: 2024  Patient name: Cris Infante  : 1950  MRN: [id number]  Referring provider: Juanjose Lloyd DO  Dx:   Encounter Diagnosis     ICD-10-CM    1. Chronic right shoulder pain  M25.511     G89.29       2. Rotator cuff tear arthropathy of right shoulder  M75.101     M12.811       3. Nontraumatic complete tear of right rotator cuff  M75.121                      Subjective: Shoulder \"feeling a little bit better\"      Objective: See treatment diary below      Assessment: Tolerated treatment well. Patient would benefit from continued PT      Plan: Continue per plan of care.      Precautions: (-)  Manuals    PROM C    10' 8' 8 min C MS 8'   Multiplanar GH Mobs Gr 3-4   8'          RS balanced position C       C                  Neuro Re-Ed                                                                                                        Ther Ex             HEP             Pulleys F, A   3'/3' 3'/3' 3'/3' 3'/3' 3/3 3'/3' 3'/3' 3'/3'   IR Strap Str :10/20  :10/10 :10/10 :10/10 :10/10 10\" x 10 :10/10 10\"x10 10\"x10   Wand Flexion AAROM   10x Seated 20         Wand Abd AAROM   10x          Wall Walk F, A 10x ea   10x ea 10 ea 10e 10x 10x 10x each 10x ea   Wand Ext AAROM 20x  10x 10x 10x 10x 10x 10x 10x 10x   Seated Scaption 20x   20x 20x   20x 20x 20x   Supine Flexion AROM 20x    20x 20x 20x 20x 20x 20x   SL Abd AROM 20x    20x 20x 20x 20x 20x 20x   SL ER AROM 20x    20x 20x 20x 20x 20x 20x   Shoulder Ext [place]  [place]           Shoulder Rows [place]  [place]                        Wand ER   10x Table slides         Ther Activity                                       Gait Training                                       Modalities                                                  "

## 2024-09-23 ENCOUNTER — OFFICE VISIT (OUTPATIENT)
Dept: PHYSICAL THERAPY | Facility: CLINIC | Age: 74
End: 2024-09-23
Payer: COMMERCIAL

## 2024-09-23 DIAGNOSIS — M25.511 CHRONIC RIGHT SHOULDER PAIN: Primary | ICD-10-CM

## 2024-09-23 DIAGNOSIS — M12.811 ROTATOR CUFF TEAR ARTHROPATHY OF RIGHT SHOULDER: ICD-10-CM

## 2024-09-23 DIAGNOSIS — G89.29 CHRONIC RIGHT SHOULDER PAIN: Primary | ICD-10-CM

## 2024-09-23 DIAGNOSIS — M75.121 NONTRAUMATIC COMPLETE TEAR OF RIGHT ROTATOR CUFF: ICD-10-CM

## 2024-09-23 DIAGNOSIS — M75.101 ROTATOR CUFF TEAR ARTHROPATHY OF RIGHT SHOULDER: ICD-10-CM

## 2024-09-23 PROCEDURE — 97140 MANUAL THERAPY 1/> REGIONS: CPT | Performed by: PHYSICAL THERAPIST

## 2024-09-23 PROCEDURE — 97110 THERAPEUTIC EXERCISES: CPT | Performed by: PHYSICAL THERAPIST

## 2024-09-23 NOTE — PROGRESS NOTES
"Daily Note     Today's date: 2024  Patient name: Cris Infante  : 1950  MRN: [id number]  Referring provider: Juanjose Lloyd DO  Dx:   Encounter Diagnosis     ICD-10-CM    1. Chronic right shoulder pain  M25.511     G89.29       2. Rotator cuff tear arthropathy of right shoulder  M75.101     M12.811       3. Nontraumatic complete tear of right rotator cuff  M75.121                      Subjective: Nothing new to report      Objective: See treatment diary below      Assessment: Tolerated treatment well. Patient would benefit from continued PT      Plan: Continue per plan of care.      Precautions: (-)  Manuals    PROM C C   10' 8' 8 min C MS 8'   Multiplanar GH Mobs Gr 3-4             RS balanced position C C      C                  Neuro Re-Ed                                                                                                        Ther Ex             HEP             Pulleys F, A    3'/3' 3'/3' 3'/3' 3/3 3'/3' 3'/3' 3'/3'   IR Strap Str :10/20 :10/20  :10/10 :10/10 :10/10 10\" x 10 :10/10 10\"x10 10\"x10   Wand Flexion AAROM    Seated 20         Wand Abd AAROM             Wall Walk F, A 10x ea 10x  10x ea 10 ea 10e 10x 10x 10x each 10x ea   Wand Ext AAROM 20x 20x  10x 10x 10x 10x 10x 10x 10x   Seated Scaption 20x 20x  20x 20x   20x 20x 20x   Supine Flexion AROM 20x 20x   20x 20x 20x 20x 20x 20x   SL Abd AROM 20x 20x   20x 20x 20x 20x 20x 20x   SL ER AROM 20x 20x   20x 20x 20x 20x 20x 20x   Shoulder Ext [place]  Blue 20           Shoulder Rows [place]  Blue 20                        Wand ER    Table slides         Ther Activity                                       Gait Training                                       Modalities                                                    "

## 2024-09-26 ENCOUNTER — OFFICE VISIT (OUTPATIENT)
Dept: PHYSICAL THERAPY | Facility: CLINIC | Age: 74
End: 2024-09-26
Payer: COMMERCIAL

## 2024-09-26 DIAGNOSIS — M25.511 CHRONIC RIGHT SHOULDER PAIN: Primary | ICD-10-CM

## 2024-09-26 DIAGNOSIS — M75.121 NONTRAUMATIC COMPLETE TEAR OF RIGHT ROTATOR CUFF: ICD-10-CM

## 2024-09-26 DIAGNOSIS — M12.811 ROTATOR CUFF TEAR ARTHROPATHY OF RIGHT SHOULDER: ICD-10-CM

## 2024-09-26 DIAGNOSIS — M75.101 ROTATOR CUFF TEAR ARTHROPATHY OF RIGHT SHOULDER: ICD-10-CM

## 2024-09-26 DIAGNOSIS — G89.29 CHRONIC RIGHT SHOULDER PAIN: Primary | ICD-10-CM

## 2024-09-26 PROCEDURE — 97140 MANUAL THERAPY 1/> REGIONS: CPT

## 2024-09-26 PROCEDURE — 97110 THERAPEUTIC EXERCISES: CPT

## 2024-09-26 NOTE — PROGRESS NOTES
"Daily Note     Today's date: 2024  Patient name: Cris Infante  : 1950  MRN: [id number]  Referring provider: Juanjoes Lloyd DO  Dx:   Encounter Diagnosis     ICD-10-CM    1. Chronic right shoulder pain  M25.511     G89.29       2. Rotator cuff tear arthropathy of right shoulder  M75.101     M12.811       3. Nontraumatic complete tear of right rotator cuff  M75.121           Start Time: 930  Stop Time: 101  Total time in clinic (min): 40 minutes    Subjective: Pt notes difficulty with OH reaching but she is trying to do it more at home.       Objective: See treatment diary below    Cueing for form throughout  Assessment: Tolerated treatment well. Patient would benefit from continued PT      Plan: Continue per plan of care.      Precautions: (-)  Manuals    PROM C C LNK  10' 8' 8 min C MS 8'   Multiplanar GH Mobs Gr 3-4             RS balanced position C C LNK     C                  Neuro Re-Ed                                                                                                        Ther Ex             HEP             Pulleys F, A   3'/3'  3'/3' 3'/3' 3/3 3'/3' 3'/3' 3'/3'   IR Strap Str :10/20 :10/20 :10/20  :10/10 :10/10 10\" x 10 :10/10 10\"x10 10\"x10   Wand Flexion AAROM             Wand Abd AAROM             Wall Walk F, A 10x ea 10x 10x  10 ea 10e 10x 10x 10x each 10x ea   Wand Ext AAROM 20x 20x 20  10x 10x 10x 10x 10x 10x   Seated Scaption 20x 20x 20  20x   20x 20x 20x   Supine Flexion AROM 20x 20x 20  20x 20x 20x 20x 20x 20x   SL Abd AROM 20x 20x 20  20x 20x 20x 20x 20x 20x   SL ER AROM 20x 20x 20  20x 20x 20x 20x 20x 20x   Shoulder Ext [place]  Blue 20 Blue 20          Shoulder Rows [place]  Blue 20 Blue 20                        Wand ER             Ther Activity                                       Gait Training                                       Modalities                                                    "

## 2024-09-30 ENCOUNTER — OFFICE VISIT (OUTPATIENT)
Dept: PHYSICAL THERAPY | Facility: CLINIC | Age: 74
End: 2024-09-30
Payer: COMMERCIAL

## 2024-09-30 DIAGNOSIS — M75.101 ROTATOR CUFF TEAR ARTHROPATHY OF RIGHT SHOULDER: ICD-10-CM

## 2024-09-30 DIAGNOSIS — M25.511 CHRONIC RIGHT SHOULDER PAIN: Primary | ICD-10-CM

## 2024-09-30 DIAGNOSIS — G89.29 CHRONIC RIGHT SHOULDER PAIN: Primary | ICD-10-CM

## 2024-09-30 DIAGNOSIS — M12.811 ROTATOR CUFF TEAR ARTHROPATHY OF RIGHT SHOULDER: ICD-10-CM

## 2024-09-30 DIAGNOSIS — M75.121 NONTRAUMATIC COMPLETE TEAR OF RIGHT ROTATOR CUFF: ICD-10-CM

## 2024-09-30 PROCEDURE — 97110 THERAPEUTIC EXERCISES: CPT

## 2024-09-30 PROCEDURE — 97112 NEUROMUSCULAR REEDUCATION: CPT

## 2024-09-30 PROCEDURE — 97140 MANUAL THERAPY 1/> REGIONS: CPT

## 2024-09-30 NOTE — PROGRESS NOTES
"Daily Note     Today's date: 2024  Patient name: Cris Infante  : 1950  MRN: [id number]  Referring provider: Juanjose Lloyd DO  Dx:   Encounter Diagnosis     ICD-10-CM    1. Chronic right shoulder pain  M25.511     G89.29       2. Rotator cuff tear arthropathy of right shoulder  M75.101     M12.811       3. Nontraumatic complete tear of right rotator cuff  M75.121           Start Time: 914  Stop Time: 953  Total time in clinic (min): 39 minutes    Subjective: Reports that she is doing okay. Still having some trouble when she attempts to lift her arm above her head and perform activities overhead.       Objective: See treatment diary below      Assessment: Tolerated treatment well. Continued per primary therapist POC. Discomfort and stiffness evident at end ranges. Continues to have limited elevation above head actively. Requires pretty significant cuing to perform exercises with correct form. Big limitation behind back IR. Patient demonstrated fatigue post treatment, exhibited good technique with therapeutic exercises, and would benefit from continued PT      Plan: Continue per plan of care.  Progress treatment as tolerated.       Precautions: (-)  Manuals    PROM C C ISAMARK   10' 8' 8 min C MS 8'   Multiplanar GH Mobs Gr 3-4             RS balanced position C C LNK      C                  Neuro Re-Ed                                                                                                        Ther Ex             HEP             Pulleys F, A   3'/3' 3'/3'  3'/3' 3'/3' 3/3 3'/3' 3'/3' 3'/3'   IR Strap Str :10/20 :10/20 :10/20 20x10\" :10/10 :10/10 10\" x 10 :10/10 10\"x10 10\"x10   Wand Flexion AAROM             Wand Abd AAROM             Wall Walk F, A 10x ea 10x 10x 10x ea  10 ea 10e 10x 10x 10x each 10x ea   Wand Ext AAROM 20x 20x 20 20  10x 10x 10x 10x 10x 10x   Seated Scaption 20x 20x 20 20 20x   20x 20x 20x   Supine Flexion AROM 20x 20x " 20 20x  20x 20x 20x 20x 20x 20x   SL Abd AROM 20x 20x 20 20x  20x 20x 20x 20x 20x 20x   SL ER AROM 20x 20x 20 20x 20x 20x 20x 20x 20x 20x   Shoulder Ext [place]  Blue 20 Blue 20 Red 20          Shoulder Rows [place]  Blue 20 Blue 20  Blue 20                       Wand ER             Ther Activity                                       Gait Training                                       Modalities

## 2024-10-02 ENCOUNTER — OFFICE VISIT (OUTPATIENT)
Dept: PHYSICAL THERAPY | Facility: CLINIC | Age: 74
End: 2024-10-02
Payer: COMMERCIAL

## 2024-10-02 DIAGNOSIS — G89.29 CHRONIC RIGHT SHOULDER PAIN: Primary | ICD-10-CM

## 2024-10-02 DIAGNOSIS — M75.101 ROTATOR CUFF TEAR ARTHROPATHY OF RIGHT SHOULDER: ICD-10-CM

## 2024-10-02 DIAGNOSIS — M12.811 ROTATOR CUFF TEAR ARTHROPATHY OF RIGHT SHOULDER: ICD-10-CM

## 2024-10-02 DIAGNOSIS — M25.511 CHRONIC RIGHT SHOULDER PAIN: Primary | ICD-10-CM

## 2024-10-02 DIAGNOSIS — M75.121 NONTRAUMATIC COMPLETE TEAR OF RIGHT ROTATOR CUFF: ICD-10-CM

## 2024-10-02 PROCEDURE — 97110 THERAPEUTIC EXERCISES: CPT | Performed by: PHYSICAL THERAPIST

## 2024-10-02 PROCEDURE — 97140 MANUAL THERAPY 1/> REGIONS: CPT | Performed by: PHYSICAL THERAPIST

## 2024-10-02 NOTE — PROGRESS NOTES
"Daily Note     Today's date: 10/2/2024  Patient name: Cris Infante  : 1950  MRN: [id number]  Referring provider: Juanjose Lloyd DO  Dx:   Encounter Diagnosis     ICD-10-CM    1. Chronic right shoulder pain  M25.511     G89.29       2. Rotator cuff tear arthropathy of right shoulder  M75.101     M12.811       3. Nontraumatic complete tear of right rotator cuff  M75.121                      Subjective: Still having difficulty reaching overhead.       Objective: See treatment diary below      Assessment: Tolerated treatment well. Patient demonstrated fatigue post treatment      Plan: Continue per plan of care.      Precautions: (-)  Manuals 9/18 9/23 09/26 09/30 10/2 9/4 9/5 9/9 9/11 9/16   PROM C C LNK MH  C 8' 8 min C MS 8'   Multiplanar GH Mobs Gr 3-4             RS balanced position C C LNK MH  C   C                  Neuro Re-Ed                                                                                                        Ther Ex             HEP             Pulleys F, A   3'/3' 3'/3'  3'/3' 3'/3' 3/3 3'/3' 3'/3' 3'/3'   IR Strap Str :10/20 :10/20 :10/20 20x10\" :10/10 :10/10 10\" x 10 :10/10 10\"x10 10\"x10   Wand Flexion AAROM             Wand Abd AAROM             Wall Walk F, A 10x ea 10x 10x 10x ea  10 ea 10e 10x 10x 10x each 10x ea   Wand Ext AAROM 20x 20x 20 20  10x 10x 10x 10x 10x 10x   Seated Scaption 20x 20x 20 20 20x   20x 20x 20x   Supine Flexion AROM 20x 20x 20 20x  20x 20x 20x 20x 20x 20x   SL Abd AROM 20x 20x 20 20x  20x 20x 20x 20x 20x 20x   SL ER AROM 20x 20x 20 20x 20x 20x 20x 20x 20x 20x   Shoulder Ext [place]  Blue 20 Blue 20 Red 20  Blue 20        Shoulder Rows [place]  Blue 20 Blue 20  Blue 20  Blue 20                     Wand ER             Ther Activity                                       Gait Training                                       Modalities                                                        " no

## 2024-10-04 PROBLEM — J20.9 ACUTE INFECTIVE TRACHEOBRONCHITIS: Status: RESOLVED | Noted: 2024-09-04 | Resolved: 2024-10-04

## 2024-10-07 ENCOUNTER — OFFICE VISIT (OUTPATIENT)
Dept: PHYSICAL THERAPY | Facility: CLINIC | Age: 74
End: 2024-10-07
Payer: COMMERCIAL

## 2024-10-07 DIAGNOSIS — M12.811 ROTATOR CUFF TEAR ARTHROPATHY OF RIGHT SHOULDER: ICD-10-CM

## 2024-10-07 DIAGNOSIS — M75.121 NONTRAUMATIC COMPLETE TEAR OF RIGHT ROTATOR CUFF: ICD-10-CM

## 2024-10-07 DIAGNOSIS — M75.101 ROTATOR CUFF TEAR ARTHROPATHY OF RIGHT SHOULDER: ICD-10-CM

## 2024-10-07 DIAGNOSIS — G89.29 CHRONIC RIGHT SHOULDER PAIN: Primary | ICD-10-CM

## 2024-10-07 DIAGNOSIS — M25.511 CHRONIC RIGHT SHOULDER PAIN: Primary | ICD-10-CM

## 2024-10-07 PROCEDURE — 97140 MANUAL THERAPY 1/> REGIONS: CPT | Performed by: PHYSICAL THERAPIST

## 2024-10-07 PROCEDURE — 97110 THERAPEUTIC EXERCISES: CPT | Performed by: PHYSICAL THERAPIST

## 2024-10-07 NOTE — PROGRESS NOTES
"Daily Note     Today's date: 10/7/2024  Patient name: Cris Infante  : 1950  MRN: [id number]  Referring provider: Juanjose Lloyd DO  Dx:   Encounter Diagnosis     ICD-10-CM    1. Chronic right shoulder pain  M25.511     G89.29       2. Rotator cuff tear arthropathy of right shoulder  M75.101     M12.811       3. Nontraumatic complete tear of right rotator cuff  M75.121                      Subjective: Pt reports that its slowly getting better.       Objective: See treatment diary below      Assessment: Tolerated treatment well. Patient demonstrated fatigue post treatment, exhibited good technique with therapeutic exercises, and would benefit from continued PT. She had difficulty with eccentric control during wall walks.       Plan: Continue per plan of care.  Progress treatment as tolerated.       Precautions: (-)  Manuals 9/18 9/23 09/26 09/30 10/2 10/7  9/9 9/11 9/16   PROM C BRUCE BARRERA MS 8'   Multiplanar GH Mobs Gr 3-4             RS balanced position BRUCE BARRERA                  Neuro Re-Ed                                                                                                        Ther Ex             HEP             Pulleys F, A   3'/3' 3'/3'  3'/3' 3'/3'  3'/3' 3'/3' 3'/3'   IR Strap Str :10/20 :10/20 :10/20 20x10\" :10/10 :10/10  :10/10 10\"x10 10\"x10   Wand Flexion AAROM             Wand Abd AAROM             Wall Walk F, A 10x ea 10x 10x 10x ea  10 ea 10 ea  10x 10x each 10x ea   Wand Ext AAROM 20x 20x 20 20  10x 20   10x 10x 10x   Seated Scaption 20x 20x 20 20 20x 20x  20x 20x 20x   Supine Flexion AROM 20x 20x 20 20x  20x 20x  20x 20x 20x   SL Abd AROM 20x 20x 20 20x  20x 20x  20x 20x 20x   SL ER AROM 20x 20x 20 20x 20x 20x  20x 20x 20x   Shoulder Ext [place]  Blue 20 Blue 20 Red 20  Blue 20 Blue 20       Shoulder Rows [place]  Blue 20 Blue 20  Blue 20  Blue 20 Blue 20                    Wand ER             Ther Activity                                       Gait " Training                                       Modalities

## 2024-10-09 ENCOUNTER — EVALUATION (OUTPATIENT)
Dept: PHYSICAL THERAPY | Facility: CLINIC | Age: 74
End: 2024-10-09
Payer: COMMERCIAL

## 2024-10-09 DIAGNOSIS — M12.811 ROTATOR CUFF TEAR ARTHROPATHY OF RIGHT SHOULDER: ICD-10-CM

## 2024-10-09 DIAGNOSIS — M75.121 NONTRAUMATIC COMPLETE TEAR OF RIGHT ROTATOR CUFF: ICD-10-CM

## 2024-10-09 DIAGNOSIS — M25.511 CHRONIC RIGHT SHOULDER PAIN: Primary | ICD-10-CM

## 2024-10-09 DIAGNOSIS — G89.29 CHRONIC RIGHT SHOULDER PAIN: Primary | ICD-10-CM

## 2024-10-09 DIAGNOSIS — M75.101 ROTATOR CUFF TEAR ARTHROPATHY OF RIGHT SHOULDER: ICD-10-CM

## 2024-10-09 PROCEDURE — 97140 MANUAL THERAPY 1/> REGIONS: CPT | Performed by: PHYSICAL THERAPIST

## 2024-10-09 PROCEDURE — 97110 THERAPEUTIC EXERCISES: CPT | Performed by: PHYSICAL THERAPIST

## 2024-10-09 NOTE — PROGRESS NOTES
PT ReEvalution    Today's date: 10/9/2024  Patient name: Cris Infante  : 1950  MRN: [id number]  Referring provider: Juanjose Lloyd DO  Dx:   Encounter Diagnosis     ICD-10-CM    1. Chronic right shoulder pain  M25.511     G89.29       2. Nontraumatic complete tear of right rotator cuff  M75.121       3. Rotator cuff tear arthropathy of right shoulder  M75.101     M12.811                      Assessment  Impairments: abnormal or restricted ROM, activity intolerance, impaired physical strength, lacks appropriate home exercise program and pain with function    Assessment details: Patient has improved since starting therapy but has not made significant improvement since her last RE a few weeks ago.  Given this, will hold further therapy now.  She returns to ortho at the end of the month.  Thank you for this pleasant referral.     Understanding of Dx/Px/POC: good     Prognosis: good    Goals  ST-6 weeks- Met  1.  Patient to be independent with HEP.  2.  Decrease pain at least 2 subjective levels.      LT-12 weeks.- Partially Met     1.   Patient to voice comfort with self management of condition.  2.  75% or > decreased pain.  3.  75% or > decreased functional deficits.  4.  Normalize AROM of all deficit planes.  5.  Normalize strength.      Plan  Patient would benefit from: skilled PT  Referral necessary: No  Planned modality interventions: cryotherapy    Planned therapy interventions: IADL retraining, joint mobilization, manual therapy, motor coordination training, neuromuscular re-education, patient education, postural training, self care, strengthening, stretching, therapeutic activities, therapeutic exercise, home exercise program, flexibility, ADL training, balance and body mechanics training    Frequency: 2x week  Treatment plan discussed with: patient        Subjective Evaluation    History of Present Illness  Onset date: > 1 month ago.  Mechanism of injury: Patient denies significant  "pain.  She denies difficulty with functional lifting or R sidelying . Reaching overhead is better with ADLs but reaching behind her back remains limited.      Patient Goals:  fix shouler    Quality of life: good    Pain  At best pain ratin  At worst pain rating: 3  Location: R Shoulder          Objective     Active Range of Motion   Left Shoulder   Flexion: 160 degrees   Abduction: 155 degrees   External rotation 45°: 60 degrees   Internal rotation BTB: T10     Right Shoulder   Flexion: 120 degrees   Abduction: 90 degrees   External rotation 45°: WFL  Internal rotation BTB: L2     Passive Range of Motion     Right Shoulder   Flexion: WFL  Abduction: WFL  External rotation 45°: WFL  Internal rotation 45°: 45 degrees     Strength/Myotome Testing     Right Shoulder     Planes of Motion   Flexion: 4-   Abduction: 4-   External rotation at 45°: 4   Internal rotation at 45°: 4+     General Comments:      Shoulder Comments   Cx Screen WFL  Mechanical Assessment of shoulder unremarkable  (-) Sulcus for hypomobility  (-) Impingement testing                Precautions: (-)  Manuals 9/18 9/23 09/26 09/30 10/2 10/7 10/9  9/11 9/16   PROM C BRUCE JAEGER   BRUCE BARRERA  MS 8'   Multiplanar GH Mobs Gr 3-4             RS balanced position BRUCE BARRERA                   Neuro Re-Ed                                                                                                        Ther Ex             HEP             Pulleys F, A   3'/3' 3'/3'  3'/3' 3'/3' 3'/3'  3'/3' 3'/3'   IR Strap Str :10/20 :10/20 :10/20 20x10\" :10/10 :10/10 :10/10  10\"x10 10\"x10   Wand Flexion AAROM             Wand Abd AAROM             Wall Walk F, A 10x ea 10x 10x 10x ea  10 ea 10 ea 10 ea  10x each 10x ea   Wand Ext AAROM 20x 20x 20 20  10x 20  20  10x 10x   Seated Scaption 20x 20x 20 20 20x 20x 20  20x 20x   Supine Flexion AROM 20x 20x 20 20x  20x 20x 20x  20x 20x   SL Abd AROM 20x 20x 20 20x  20x 20x 20x  20x 20x   SL ER AROM 20x 20x 20 20x 20x 20x 20x  " 20x 20x   Shoulder Ext [place]  Blue 20 Blue 20 Red 20  Blue 20 Blue 20 Blue 20      Shoulder Rows [place]  Blue 20 Blue 20  Blue 20  Blue 20 Blue 20 Blue 20                   Wand ER             Ther Activity                                       Gait Training                                       Modalities

## 2024-10-30 ENCOUNTER — OFFICE VISIT (OUTPATIENT)
Dept: OBGYN CLINIC | Facility: CLINIC | Age: 74
End: 2024-10-30
Payer: COMMERCIAL

## 2024-10-30 VITALS — BODY MASS INDEX: 28.66 KG/M2 | HEIGHT: 65 IN | WEIGHT: 172 LBS

## 2024-10-30 DIAGNOSIS — M75.121 NONTRAUMATIC COMPLETE TEAR OF RIGHT ROTATOR CUFF: Primary | ICD-10-CM

## 2024-10-30 PROCEDURE — 99213 OFFICE O/P EST LOW 20 MIN: CPT | Performed by: STUDENT IN AN ORGANIZED HEALTH CARE EDUCATION/TRAINING PROGRAM

## 2024-10-30 PROCEDURE — 20610 DRAIN/INJ JOINT/BURSA W/O US: CPT

## 2024-10-30 RX ORDER — BUPIVACAINE HYDROCHLORIDE 2.5 MG/ML
4 INJECTION, SOLUTION INFILTRATION; PERINEURAL
Status: COMPLETED | OUTPATIENT
Start: 2024-10-30 | End: 2024-10-30

## 2024-10-30 RX ORDER — TRIAMCINOLONE ACETONIDE 40 MG/ML
40 INJECTION, SUSPENSION INTRA-ARTICULAR; INTRAMUSCULAR
Status: COMPLETED | OUTPATIENT
Start: 2024-10-30 | End: 2024-10-30

## 2024-10-30 RX ADMIN — TRIAMCINOLONE ACETONIDE 40 MG: 40 INJECTION, SUSPENSION INTRA-ARTICULAR; INTRAMUSCULAR at 10:15

## 2024-10-30 RX ADMIN — BUPIVACAINE HYDROCHLORIDE 4 ML: 2.5 INJECTION, SOLUTION INFILTRATION; PERINEURAL at 10:15

## 2024-10-30 NOTE — PROGRESS NOTES
"Ortho Sports Medicine Shoulder New Patient Visit     Assesment:   73 y.o. adult right shoulder chronic rotator cuff tear    Plan:  The patient's diagnosis and treatment were discussed at length today. We discussed no treatment, non-operative treatment, and operative treatment.    Cris presents today for follow up right shoulder chronic rotator cuff tear.  Discussed that due to continued pain with previous pain relief with subacromial corticosteroid injection that recommended repeated injection at today's visit. A right subacromial corticosteroid injection was given without complication and was well tolerated. Discussed that she should continue home exercises to improve forward flexion and abduction. Discussed that she can use ice, ibuprofen and tylenol as needed for continued discomfort and can follow up in 3 months for possible repeat corticosteroid injection.     Large joint arthrocentesis: R subacromial bursa  Universal Protocol:  Consent: Verbal consent obtained.  Risks and benefits: risks, benefits and alternatives were discussed  Consent given by: patient  Time out: Immediately prior to procedure a \"time out\" was called to verify the correct patient, procedure, equipment, support staff and site/side marked as required.  Timeout called at: 10/30/2024 10:41 AM.  Patient understanding: patient states understanding of the procedure being performed  Patient consent: the patient's understanding of the procedure matches consent given  Site marked: the operative site was marked  Patient identity confirmed: verbally with patient  Supporting Documentation  Indications: pain and diagnostic evaluation   Procedure Details  Location: shoulder - R subacromial bursa  Preparation: Patient was prepped and draped in the usual sterile fashion  Needle size: 22 G  Ultrasound guidance: no  Approach: posterior  Medications administered: 4 mL bupivacaine 0.25 %; 40 mg triamcinolone acetonide 40 mg/mL    Patient tolerance: patient " tolerated the procedure well with no immediate complications  Dressing:  Sterile dressing applied          Conservative treatment:    Ice to shoulder 1-2 times daily, for 20 minutes at a time.  PT for ROM and strengthening to shoulder, rotator cuff, scapular stabilizers.  Home exercise program for shoulder, including ROM and strenthening.  Instructions given to patient of what exercises to perform.  Let pain guide return to activities.      Imaging:    All imaging from today was reviewed by myself and explained to the patient.       Injection:    The risks and benefits of the injection (which include but are not limited to: infection, bleeding,damage to nerve/artery, need for further intervention), as well as the risks and benefits of all alternative treatments were explained and understood.  The patient elected to proceed with injection. The procedure was done with aseptic technique, and the patient tolerated the procedure well with no complications.  A corticosteroid injection of the subacromial space was performed.  The patient should take 1-2 days off of activity, with gradual return to activity as tolerated.  Ice to the shoulder 1-2 times daily for 20 minutes, for next 24-48 hrs.      Surgery:     No surgery is recommended at this point, continue with conservative treatment plan as noted.      Follow up:    No follow-ups on file.        Chief Complaint   Patient presents with    Right Shoulder - Pain     Wants CSI     Injections       History of Present Illness:    The patient is a 73 y.o., right hand dominant adult whose occupation is retired, referred to me by Wilman De La O PT, seen in clinic for consultation of right shoulder pain.  She states that she has been having right shoulder pain now for approximately 2 to 3 months without any specific injury or trauma.  She states that the pain initially began when she was trying to get dressed and had sharp pain.  She states that her pain is predominantly on the  anterior lateral aspect of her shoulder.  She states that this is because limited movement and issues with activities of daily living.  She attempted any cortisone injections or outpatient physical therapy.  She is currently managing her pain with ice and over-the-counter meds she denies any numbness or tingling.    Update 10/30/24:  Patient is present for follow up, states that the last corticosteroid injection allowed her months of relief and pain is starting to return. She recently discontinued physical therapy and has noted some progress, still has limitation to forward flexion and abduction. She denies any new injury and denies any numbness or tingling. Pain is dull and achy with reaching above head.     Shoulder Surgical History:  None    Past Medical, Social and Family History:  Past Medical History:   Diagnosis Date    Abrasion of knee, bilateral 08/31/2022    Acute pain of right shoulder 08/19/2024    Allergic rhinitis 12/02/2021    Bilateral hearing loss 04/29/2022    Bilateral impacted cerumen 04/29/2022    BMI 31.0-31.9,adult 12/20/2023    BMI 31.0-31.9,adult 05/13/2024    BMI 32.0-32.9,adult 04/29/2022    BMI 33.0-33.9,adult 12/02/2021    Cataract, bilateral     Chronic bilateral low back pain without sciatica 12/08/2022    Chronic right-sided low back pain with right-sided sciatica 12/20/2023    Constipation     Constipation 12/02/2021    COVID-19 12/04/2023    Deficiency anemia 03/19/2024    Elevated glucose level     Fatigue     History of anemia     Hypertriglyceridemia     Hypertriglyceridemia 12/02/2021    Low vitamin B12 level 05/13/2024    Medicare annual wellness visit, subsequent 12/02/2021    Medicare annual wellness visit, subsequent 12/08/2022    Medicare annual wellness visit, subsequent 12/20/2023    Monoclonal gammopathy 05/13/2024    Obesity     Osteopenia     Osteopenia of multiple sites 04/29/2022    Pain in both knees 08/31/2022    Pain of left heel 12/02/2021    Post-menopausal      Prediabetes 2023    Rash     Spondylolisthesis of lumbar region 2024    Type 2 diabetes mellitus without complication, without long-term current use of insulin (HCC) 2024    Vitamin D deficiency     Vitamin D deficiency 2021     Past Surgical History:   Procedure Laterality Date    CATARACT EXTRACTION, BILATERAL  2018     SECTION      COLONOSCOPY  2014    Advise f/u colonoscopy; see by [facility] 2020 at office; she is supposed to have a colonoscopy, but due to Covid 19, she wants to hold for now     DXA PROCEDURE (HISTORICAL)  2020    MAMMO (HISTORICAL)  2020     No Known Allergies  Current Outpatient Medications on File Prior to Visit   Medication Sig Dispense Refill    calcium carbonate (OS-FELICITA) 600 MG tablet Take 600 mg by mouth 2 (two) times a day with meals      cholecalciferol (VITAMIN D3) 1,000 units tablet Take 1,000 Units by mouth daily Patient reports taking 2,000 units daily      meloxicam (MOBIC) 15 mg tablet Take 1 tablet (15 mg total) by mouth daily as needed for moderate pain 30 tablet 0    Multiple Vitamin (MULTI-VITAMIN DAILY) TABS Take by mouth      psyllium (METAMUCIL) 58.6 % packet Take 1 packet by mouth as needed      senna-docusate sodium (SENOKOT-S) 8.6-50 mg per tablet Take 2 tablets by mouth 2 (two) times a day      vitamin B-12 (VITAMIN B-12) 1,000 mcg tablet Take 1,000 mcg by mouth daily      glucose blood test strip Use 1 each daily as needed Use as instructed (Patient not taking: Reported on 2024)       No current facility-administered medications on file prior to visit.     Social History     Socioeconomic History    Marital status: /Civil Union     Spouse name: Not on file    Number of children: Not on file    Years of education: Not on file    Highest education level: Not on file   Occupational History    Not on file   Tobacco Use    Smoking status: Never    Smokeless tobacco: Never   Vaping Use    Vaping status: Never  "Used   Substance and Sexual Activity    Alcohol use: Yes     Alcohol/week: 1.0 standard drink of alcohol     Types: 1 Cans of beer per week     Comment: occasional    Drug use: No    Sexual activity: Not Currently     Partners: Male   Other Topics Concern    Not on file   Social History Narrative    Lives with spouse    Pap smear: She goes to her gyn for pap smear    Annual eye exam: Advise     Social Determinants of Health     Financial Resource Strain: Low Risk  (12/20/2023)    Overall Financial Resource Strain (CARDIA)     Difficulty of Paying Living Expenses: Not very hard   Food Insecurity: Not on file   Transportation Needs: No Transportation Needs (12/20/2023)    PRAPARE - Transportation     Lack of Transportation (Medical): No     Lack of Transportation (Non-Medical): No   Physical Activity: Not on file   Stress: Not on file   Social Connections: Not on file   Intimate Partner Violence: Not on file   Housing Stability: Not on file         I have reviewed the past medical, surgical, social and family history, medications and allergies as documented in the EMR.    Review of systems: ROS is negative other than that noted in the HPI.  Constitutional: Negative for fatigue and fever.   HENT: Negative for sore throat.    Respiratory: Negative for shortness of breath.    Cardiovascular: Negative for chest pain.   Gastrointestinal: Negative for abdominal pain.   Endocrine: Negative for cold intolerance and heat intolerance.   Genitourinary: Negative for flank pain.   Musculoskeletal: Negative for back pain.   Skin: Negative for rash.   Allergic/Immunologic: Negative for immunocompromised state.   Neurological: Negative for dizziness.   Psychiatric/Behavioral: Negative for agitation.      Physical Exam:    Height 5' 5\" (1.651 m), weight 78 kg (172 lb).    General/Constitutional: NAD, well developed, well nourished  HENT: Normocephalic, atraumatic  CV: Intact distal pulses, regular rate  Resp: No respiratory distress " or labored breathing  Lymphatic: No lymphadenopathy palpated  Neuro: Alert and Oriented x 3, no focal deficits  Psych: Normal mood, normal affect, normal judgement, normal behavior  Skin: Warm, dry, no rashes, no erythema      Shoulder focused exam:     Visual inspection of the shoulder demonstrates normal contour without atrophy  No evidence of scapular dyskinesia or atrophy.  No scapular winging  Active and passive range of motion demonstrates forward flexion to 90, abduction to 80, difficulty reaching back of head, external rotation is 30 with the arm the side, internal rotation to L1   Rotator cuff strength is 4/5 to resisted forward flexion, 4/5 resisted abduction, 4/5 resisted external rotation, 5/5 resisted internal rotation  No tenderness to palpation at the distal clavicle, AC joint, acromion, or scapular spine  No pain with cross-body adduction  + Neer's test, + modified Ching impingement test  Negative external rotation lag sign  Negative belly press, negative lift-off  - speed's and Yergason's test  + tenderness to palpation at the bicipital groove  - [place]'s test        UE NV Exam: +2 Radial pulses bilaterally  Sensation intact to light touch C5-T1 bilaterally, Radial/median/ulnar nerve motor intact      Bilateral elbow, wrist, and and forearm ROM full, painless with passive ROM, no ttp or crepitance throughout extremities below shoulder joint    Cervical ROM is full without pain, numbness or tingling            Scribe Attestation      I,:  Adnreia Post PA-C am acting as a scribe while in the presence of the attending physician.:       I,:  Andreia Post PA-C personally performed the services described in this documentation    as scribed in my presence.:

## 2024-10-31 ENCOUNTER — OFFICE VISIT (OUTPATIENT)
Dept: INTERNAL MEDICINE CLINIC | Facility: CLINIC | Age: 74
End: 2024-10-31
Payer: COMMERCIAL

## 2024-10-31 VITALS
WEIGHT: 172 LBS | BODY MASS INDEX: 28.66 KG/M2 | DIASTOLIC BLOOD PRESSURE: 80 MMHG | OXYGEN SATURATION: 99 % | SYSTOLIC BLOOD PRESSURE: 120 MMHG | HEART RATE: 69 BPM | HEIGHT: 65 IN

## 2024-10-31 DIAGNOSIS — H91.93 BILATERAL HEARING LOSS, UNSPECIFIED HEARING LOSS TYPE: Primary | ICD-10-CM

## 2024-10-31 DIAGNOSIS — Z23 NEED FOR PROPHYLACTIC VACCINATION AND INOCULATION AGAINST INFLUENZA: ICD-10-CM

## 2024-10-31 DIAGNOSIS — H61.23 IMPACTED CERUMEN OF BOTH EARS: ICD-10-CM

## 2024-10-31 PROCEDURE — G0008 ADMIN INFLUENZA VIRUS VAC: HCPCS | Performed by: INTERNAL MEDICINE

## 2024-10-31 PROCEDURE — 90662 IIV NO PRSV INCREASED AG IM: CPT | Performed by: INTERNAL MEDICINE

## 2024-10-31 PROCEDURE — 99213 OFFICE O/P EST LOW 20 MIN: CPT | Performed by: INTERNAL MEDICINE

## 2024-10-31 NOTE — PATIENT INSTRUCTIONS
Patient was advised to continue present medications. discussed with the patient medications and laboratory data in detail.  Follow-up with me  as advised.  If any blood test was ordered please do 1 week prior to next appointment unless advise to get earlier.  If you have any questions please call the office [phone]

## 2024-10-31 NOTE — PROGRESS NOTES
Assessment/Plan:    1. Bilateral hearing loss, unspecified hearing loss type  2. Impacted cerumen of both ears  3. Need for prophylactic vaccination and inoculation against influenza  -     influenza vaccine, high-dose, PF 0.5 mL (Fluzone High Dose)     Discussion/summary/plan:    Patient complain of bilateral hearing loss.  She has been using Debrox eardrops but not getting better.  She has a bilateral impacted cerumen.  I irrigated both ears with water and peroxide.  After irrigation of ears she was able to hear well.  After irrigation both ears were clear there was no signs of any infection.  Both tympanic membranes were intact.    Subjective: Hearing loss bilaterally      Patient ID: Cris Infante is a 73 y.o. adult.    HPI  73-year-old female patient presents with complaint of bilateral hearing loss.  She has been using Debrox eardrops but not getting better.  She has right shoulder pain seen by orthopedic and injection.    The following portions of the patient's history were reviewed and updated as appropriate:     Past Medical History:  She has a past medical history of Abrasion of knee, bilateral (08/31/2022), Acute pain of right shoulder (08/19/2024), Allergic rhinitis (12/02/2021), Bilateral hearing loss (04/29/2022), Bilateral impacted cerumen (04/29/2022), BMI 31.0-31.9,adult (12/20/2023), BMI 31.0-31.9,adult (05/13/2024), BMI 32.0-32.9,adult (04/29/2022), BMI 33.0-33.9,adult (12/02/2021), Cataract, bilateral, Chronic bilateral low back pain without sciatica (12/08/2022), Chronic right-sided low back pain with right-sided sciatica (12/20/2023), Constipation, Constipation (12/02/2021), COVID-19 (12/04/2023), Deficiency anemia (03/19/2024), Elevated glucose level, Fatigue, History of anemia, Hypertriglyceridemia, Hypertriglyceridemia (12/02/2021), Low vitamin B12 level (05/13/2024), Medicare annual wellness visit, subsequent (12/02/2021), Medicare annual wellness visit, subsequent (12/08/2022), Medicare  annual wellness visit, subsequent (2023), Monoclonal gammopathy (2024), Obesity, Osteopenia, Osteopenia of multiple sites (2022), Pain in both knees (2022), Pain of left heel (2021), Post-menopausal, Prediabetes (2023), Rash, Spondylolisthesis of lumbar region (2024), Type 2 diabetes mellitus without complication, without long-term current use of insulin (HCC) (2024), Vitamin D deficiency, and Vitamin D deficiency (2021).,  _______________________________________________________________________  Past Surgical History:   has a past surgical history that includes  section; Cataract extraction, bilateral (); Colonoscopy (2014); Mammo (historical) (2020); and DXA procedure(historical) (2020).,  _______________________________________________________________________  Family History:  family history includes Diabetes in her brother, brother, mother, sister, and sister; No Known Problems in her brother, brother, daughter, daughter, daughter, daughter, daughter, father, maternal aunt, maternal grandfather, maternal grandmother, paternal aunt, paternal grandfather, paternal grandmother, sister, sister, and son; Other in her mother.,  _______________________________________________________________________  Social History:   reports that she has never smoked. She has never used smokeless tobacco. She reports current alcohol use of about 1.0 standard drink of alcohol per week. She reports that she does not use drugs.,  _______________________________________________________________________  Allergies:  has No Known Allergies..  _______________________________________________________________________  Current Outpatient Medications   Medication Sig Dispense Refill    calcium carbonate (OS-FELICITA) 600 MG tablet Take 600 mg by mouth 2 (two) times a day with meals      cholecalciferol (VITAMIN D3) 1,000 units tablet Take 1,000 Units by mouth daily Patient  "reports taking 2,000 units daily      meloxicam (MOBIC) 15 mg tablet Take 1 tablet (15 mg total) by mouth daily as needed for moderate pain 30 tablet 0    Multiple Vitamin (MULTI-VITAMIN DAILY) TABS Take by mouth      psyllium (METAMUCIL) 58.6 % packet Take 1 packet by mouth as needed      senna-docusate sodium (SENOKOT-S) 8.6-50 mg per tablet Take 2 tablets by mouth 2 (two) times a day      vitamin B-12 (VITAMIN B-12) 1,000 mcg tablet Take 1,000 mcg by mouth daily      glucose blood test strip Use 1 each daily as needed Use as instructed (Patient not taking: Reported on 9/4/2024)       No current facility-administered medications for this visit.     _______________________________________________________________________  Review of Systems   Constitutional:  Negative for chills and fever.   HENT:  Positive for hearing loss. Negative for congestion, ear pain, nosebleeds, sinus pain, sore throat and trouble swallowing.    Eyes:  Negative for discharge, redness and visual disturbance.   Respiratory:  Negative for cough, chest tightness and shortness of breath.    Cardiovascular:  Negative for chest pain and palpitations.   Gastrointestinal:  Negative for abdominal pain, blood in stool, constipation, diarrhea, nausea and vomiting.   Musculoskeletal:  Positive for arthralgias (Right shoulder pain). Negative for myalgias and neck pain.   Skin:  Negative for color change and rash.   Neurological:  Negative for dizziness, speech difficulty, weakness and headaches.   Hematological:  Does not bruise/bleed easily.   Psychiatric/Behavioral:  Negative for agitation and behavioral problems.          Objective:  Vitals:    10/31/24 1249   BP: 120/80   Pulse: 69   SpO2: 99%   Weight: 78 kg (172 lb)   Height: 5' 5\" (1.651 m)     Body mass index is 28.62 kg/m².     Physical Exam  Vitals and nursing note reviewed.   Constitutional:       General: She is not in acute distress.     Appearance: Normal appearance.   HENT:      Head: " Normocephalic and atraumatic.      Right Ear: Tympanic membrane, ear canal and external ear normal. There is no impacted cerumen.      Left Ear: Tympanic membrane, ear canal and external ear normal. There is no impacted cerumen.      Ears:      Comments: She had bilateral impacted cerumen's.  I irrigated both the ears.  After irrigation she was able to hear well.  After irrigation both ears were clear there was no any signs of infection or any discharge and both tympanic membranes were intact.     Nose: Nose normal.      Mouth/Throat:      Mouth: Mucous membranes are moist.   Eyes:      General: No scleral icterus.        Right eye: No discharge.         Left eye: No discharge.      Extraocular Movements: Extraocular movements intact.      Conjunctiva/sclera: Conjunctivae normal.   Cardiovascular:      Rate and Rhythm: Normal rate and regular rhythm.      Pulses: Normal pulses.      Heart sounds: No murmur heard.  Pulmonary:      Effort: Pulmonary effort is normal. No respiratory distress.      Breath sounds: Normal breath sounds.   Abdominal:      General: Bowel sounds are normal.      Palpations: Abdomen is soft.      Tenderness: There is no abdominal tenderness.   Musculoskeletal:         General: Tenderness (Has some tenderness right shoulder and decreased range of right shoulder.) present.      Cervical back: Normal range of motion and neck supple. No muscular tenderness.      Right lower leg: No edema.      Left lower leg: No edema.   Skin:     General: Skin is warm.   Neurological:      General: No focal deficit present.      Mental Status: She is alert and oriented to person, place, and time.   Psychiatric:         Mood and Affect: Mood normal.         Behavior: Behavior normal.

## 2024-11-05 ENCOUNTER — RA CDI HCC (OUTPATIENT)
Dept: OTHER | Facility: HOSPITAL | Age: 74
End: 2024-11-05

## 2024-11-13 ENCOUNTER — IMMUNIZATIONS (OUTPATIENT)
Dept: INTERNAL MEDICINE CLINIC | Facility: CLINIC | Age: 74
End: 2024-11-13
Payer: COMMERCIAL

## 2024-11-13 DIAGNOSIS — Z12.11 SCREENING FOR COLORECTAL CANCER: Primary | ICD-10-CM

## 2024-11-13 DIAGNOSIS — Z12.12 SCREENING FOR COLORECTAL CANCER: Primary | ICD-10-CM

## 2024-11-13 DIAGNOSIS — Z23 NEED FOR COVID-19 VACCINE: Primary | ICD-10-CM

## 2024-11-13 PROCEDURE — 90480 ADMN SARSCOV2 VAC 1/ONLY CMP: CPT | Performed by: INTERNAL MEDICINE

## 2024-11-13 PROCEDURE — 91320 SARSCV2 VAC 30MCG TRS-SUC IM: CPT | Performed by: INTERNAL MEDICINE

## 2024-11-30 PROBLEM — H61.23 IMPACTED CERUMEN OF BOTH EARS: Status: RESOLVED | Noted: 2022-04-29 | Resolved: 2024-11-30

## 2024-12-07 ENCOUNTER — VBI (OUTPATIENT)
Dept: ADMINISTRATIVE | Facility: OTHER | Age: 74
End: 2024-12-07

## 2024-12-07 NOTE — TELEPHONE ENCOUNTER
12/07/24 5:38 PM     Chart reviewed for Hemoglobin A1c was/were submitted to the patient's insurance.     Karrie Richardson   [facility]

## 2024-12-16 ENCOUNTER — OFFICE VISIT (OUTPATIENT)
Dept: GASTROENTEROLOGY | Facility: CLINIC | Age: 74
End: 2024-12-16
Payer: COMMERCIAL

## 2024-12-16 VITALS
WEIGHT: 172.8 LBS | HEIGHT: 65 IN | HEART RATE: 68 BPM | DIASTOLIC BLOOD PRESSURE: 82 MMHG | BODY MASS INDEX: 28.79 KG/M2 | SYSTOLIC BLOOD PRESSURE: 134 MMHG

## 2024-12-16 DIAGNOSIS — K59.00 CONSTIPATION, UNSPECIFIED CONSTIPATION TYPE: Primary | ICD-10-CM

## 2024-12-16 PROCEDURE — 99214 OFFICE O/P EST MOD 30 MIN: CPT | Performed by: NURSE PRACTITIONER

## 2024-12-16 RX ORDER — LINACLOTIDE 145 UG/1
CAPSULE, GELATIN COATED ORAL
Qty: 90 CAPSULE | Refills: 0 | Status: SHIPPED | OUTPATIENT
Start: 2024-12-16

## 2024-12-16 NOTE — PROGRESS NOTES
Name: Cris Infante      : 1950      MRN: [id number]  Encounter Provider: STACY Higgins  Encounter Date: 2024   Encounter department: [facility] GASTROENTEROLOGY [place] [address]  :  Assessment & Plan  Constipation, unspecified constipation type  Failure OTC therapies including Meatmucil, Miralax, Senokot. Having 1 BM per week (which  reports is large), denies any abdominal pain, bloating, nausea, rectal bleeding, unintended weight loss.  reports she had cologuard stool test performed last week, results not yet available. TSH last December WNL  -Trial Linzess 145 mcg daily 1/2 hour before breakfast. It appears Trulance, Amitiza aren't covered by insurance  -Discussed with pt and  if inadequate response after 2 weeks, can increase to 290mcg dose  -Alternatively if having diarrhea, then can switch to lower dose  -If not covered by insurance or too expensive, then can continue Miralax/Metamucil and titrate Miralax to effect (ie can try 2 capfuls of Mirlax daily)  -Continue to stay well hydrated, encouraged activity as tolerated  -f/u cologuard, if positive will need colonoscopy    Orders:    linaCLOtide (Linzess) 145 MCG CAPS; Take 1 capsule on an empty stomach (water only) 30 minutes before a meal (at the same time each day). Text Linzess to 74407 for co-pay card.        History of Present Illness   HPI  Cris Infante is a 74 y.o. adult with PMH of T2DM, osteopenia, chronic low back pain, and constipation who presents with her  for follow up to constipation. Has failed OTC therapies including Meatmucil, Miralax, Senokot. Having 1 BM per week (which  reports is large), denies any abdominal pain, bloating, nausea, rectal bleeding, unintended weight loss.  reports she had cologuard stool test performed last week, results not yet available.      History obtained from: patient and patient's Significant Other    Review of Systems  Medical  "History Reviewed by provider this encounter:  Tobacco  Allergies  Meds  Problems  Med Hx  Surg Hx  Fam Hx     .     Objective   /82 (BP Location: Left arm, Patient Position: Sitting, Cuff Size: Standard)   Pulse 68   Ht 5' 5\" (1.651 m)   Wt 78.4 kg (172 lb 12.8 oz)   BMI 28.76 kg/m²      Physical Exam  Vitals and nursing note reviewed.   Constitutional:       General: She is not in acute distress.     Appearance: She is well-developed.   HENT:      Head: Normocephalic and atraumatic.   Eyes:      Conjunctiva/sclera: Conjunctivae normal.   Cardiovascular:      Rate and Rhythm: Normal rate and regular rhythm.      Heart sounds: No murmur heard.  Pulmonary:      Effort: Pulmonary effort is normal. No respiratory distress.      Breath sounds: Normal breath sounds.   Abdominal:      Palpations: Abdomen is soft.      Tenderness: There is no abdominal tenderness.   Musculoskeletal:         General: No swelling.      Cervical back: Neck supple.   Skin:     General: Skin is warm and dry.      Capillary Refill: Capillary refill takes less than 2 seconds.   Neurological:      Mental Status: She is alert and oriented to person, place, and time.   Psychiatric:         Mood and Affect: Mood normal.       "

## 2024-12-16 NOTE — PATIENT INSTRUCTIONS
-Try Linzess 145mcg daily 1/2 hour before breakfast for constipation  -If this is too expensive or insurance doesn't cover it, then try 2 capfuls of Miralax daily in addition to Metamucil daily

## 2024-12-17 ENCOUNTER — TELEPHONE (OUTPATIENT)
Dept: ADMINISTRATIVE | Facility: OTHER | Age: 74
End: 2024-12-17

## 2024-12-17 ENCOUNTER — OFFICE VISIT (OUTPATIENT)
Dept: INTERNAL MEDICINE CLINIC | Facility: CLINIC | Age: 74
End: 2024-12-17
Payer: COMMERCIAL

## 2024-12-17 VITALS
TEMPERATURE: 97.9 F | WEIGHT: 170 LBS | BODY MASS INDEX: 28.32 KG/M2 | HEART RATE: 80 BPM | SYSTOLIC BLOOD PRESSURE: 120 MMHG | DIASTOLIC BLOOD PRESSURE: 70 MMHG | OXYGEN SATURATION: 98 % | RESPIRATION RATE: 18 BRPM | HEIGHT: 65 IN

## 2024-12-17 DIAGNOSIS — K59.09 OTHER CONSTIPATION: ICD-10-CM

## 2024-12-17 DIAGNOSIS — D53.9 DEFICIENCY ANEMIA: ICD-10-CM

## 2024-12-17 DIAGNOSIS — E11.9 TYPE 2 DIABETES MELLITUS WITHOUT COMPLICATION, WITHOUT LONG-TERM CURRENT USE OF INSULIN (HCC): Primary | ICD-10-CM

## 2024-12-17 DIAGNOSIS — E78.1 HYPERTRIGLYCERIDEMIA: ICD-10-CM

## 2024-12-17 DIAGNOSIS — R79.89 LOW VITAMIN B12 LEVEL: ICD-10-CM

## 2024-12-17 DIAGNOSIS — Z78.0 POSTMENOPAUSAL: ICD-10-CM

## 2024-12-17 DIAGNOSIS — M85.89 OSTEOPENIA OF MULTIPLE SITES: ICD-10-CM

## 2024-12-17 DIAGNOSIS — D47.2 MONOCLONAL GAMMOPATHY: ICD-10-CM

## 2024-12-17 DIAGNOSIS — E55.9 VITAMIN D DEFICIENCY: ICD-10-CM

## 2024-12-17 PROCEDURE — 99214 OFFICE O/P EST MOD 30 MIN: CPT | Performed by: INTERNAL MEDICINE

## 2024-12-17 PROCEDURE — G2211 COMPLEX E/M VISIT ADD ON: HCPCS | Performed by: INTERNAL MEDICINE

## 2024-12-17 NOTE — TELEPHONE ENCOUNTER
----- Message from Rebeka JEAN sent at 12/17/2024 10:22 AM EST -----  Regarding: care gap request  12/17/24 10:22 AM    Hello, our patient attached above has had Diabetic Eye Exam completed/performed. Please assist in updating the patient chart by making an External outreach to [facility] facility located in [place], [place]. The date of service is within the last year.    Thank you,  Rebeka Ferrara LPN  [facility] INTERNAL MED

## 2024-12-17 NOTE — LETTER
Diabetic Eye Exam Form    Date Requested: 24  Patient: Cris Infante  Patient : 1950   Referring Provider: Nicola Marquez MD      DIABETIC Eye Exam Date _______________________________      Type of Exam MUST be documented for Diabetic Eye Exams. Please CHECK ONE.     Retinal Exam       Dilated Retinal Exam       OCT       Optomap-Iris Exam      Fundus Photography       Left Eye - Please check Retinopathy or No Retinopathy        Exam did show retinopathy    Exam did not show retinopathy       Right Eye - Please check Retinopathy or No Retinopathy       Exam did show retinopathy    Exam did not show retinopathy       Comments __________________________________________________________    Practice Providing Exam ______________________________________________    Exam Performed By (print name) _______________________________________      Provider Signature ___________________________________________________      These reports are needed for  compliance.  Please fax this completed form and a copy of the Diabetic Eye Exam report to our office located at [address] [place] [address] as soon as possible via Fax [phone] haydee To: Phone [phone]  We thank you for your assistance in treating our mutual patient.

## 2024-12-17 NOTE — ASSESSMENT & PLAN NOTE
Diet controlled diabetes mellitus.  Advised to continue 1800-calorie ADA diet.  Will follow blood sugar hemoglobin A1c.  Lab Results   Component Value Date    HGBA1C 6.6 (H) 08/13/2024       Orders:  •  Basic metabolic panel; Future  •  Lipid panel; Future  •  Hemoglobin A1C; Future  •  Hemoglobin A1C; Future

## 2024-12-17 NOTE — ASSESSMENT & PLAN NOTE
Last vitamin B12 level was low she has been after she has been taking vitamin B12 1000 mcg once a day.  Will follow vitamin B12 level.  Orders:  •  CBC and differential; Future  •  Vitamin B12; Future

## 2024-12-17 NOTE — PROGRESS NOTES
Name: Cris Infante      : 1950      MRN: [id number]  Encounter Provider: Nicola Marquez MD  Encounter Date: 2024   Encounter department: [facility] INTERNAL MEDICINE  :  Assessment & Plan  Type 2 diabetes mellitus without complication, without long-term current use of insulin (HCC)  Diet controlled diabetes mellitus.  Advised to continue 1800-calorie ADA diet.  Will follow blood sugar hemoglobin A1c.  Lab Results   Component Value Date    HGBA1C 6.6 (H) 2024       Orders:  •  Basic metabolic panel; Future  •  Lipid panel; Future  •  Hemoglobin A1C; Future  •  Hemoglobin A1C; Future    Osteopenia of multiple sites  She takes calcium and vitamin D supplement.  Last DEXA scan 2022 advised for follow-up DEXA scan.  Advised to exercise.  Orders:  •  Vitamin D 25 hydroxy; Future  •  DXA bone density spine hip and pelvis; Future    Low vitamin B12 level  Last vitamin B12 level was low she has been after she has been taking vitamin B12 1000 mcg once a day.  Will follow vitamin B12 level.  Orders:  •  CBC and differential; Future  •  Vitamin B12; Future    Monoclonal gammopathy  She was seen by hematologist.  Advised to follow with a hematologist.       Hypertriglyceridemia  Diet controlled.  Cholesterol 163, triglyceride 87, HDL 47, LDL 99 advised to continue low-cholesterol low carbs diet.  Will follow lipid panel.  Orders:  •  Lipid panel; Future    Vitamin D deficiency  Vitamin D deficiency on vitamin D supplement vitamin D level 59 advised to continue same supplement will follow vitamin D level.  Orders:  •  Basic metabolic panel; Future  •  Vitamin D 25 hydroxy; Future  •  DXA bone density spine hip and pelvis; Future    Other constipation  She was seen by GI specialist started on Linzess.       BMI 28.0-28.9,adult  Patient  was advised to decrease portion size.  Advised to decrease carb, sugar, cholesterol intake.  Advised to exercise 3-5 times per week.   "Advised to lose weight.  Patient states she has been watching her diet and try to exercise as much as she can to lose weight.         Deficiency anemia  Last hemoglobin 11.7.  Will follow CBC.  As mentioned above now on vitamin B12 supplement.  Seen by GI specialist yesterday.  Orders:  •  CBC and differential; Future  •  Vitamin B12; Future    Postmenopausal    Orders:  •  DXA bone density spine hip and pelvis; Future           History of Present Illness     HPI  74-year-old female patient present for follow-up of her medical problems.    Review of Systems   Constitutional:  Negative for chills and fever.   HENT:  Negative for congestion, ear pain, hearing loss, nosebleeds, sinus pain, sore throat and trouble swallowing.    Eyes:  Negative for discharge, redness and visual disturbance.   Respiratory:  Negative for cough, chest tightness and shortness of breath.    Cardiovascular:  Negative for chest pain and palpitations.   Gastrointestinal:  Positive for constipation. Negative for abdominal pain, blood in stool, diarrhea, nausea and vomiting.   Genitourinary:  Negative for dysuria, flank pain, frequency and hematuria.   Musculoskeletal:  Positive for arthralgias (Has some pain in the right shoulder but better after on physical therapy.  She is not taking any pain medication.). Negative for myalgias and neck pain.   Skin:  Negative for color change and rash.   Neurological:  Negative for dizziness, speech difficulty, weakness and headaches.   Hematological:  Does not bruise/bleed easily.   Psychiatric/Behavioral:  Negative for agitation and behavioral problems.          Objective   /70 (BP Location: Left arm, Patient Position: Sitting, Cuff Size: Standard)   Pulse 80   Temp 97.9 °F (36.6 °C) (Temporal)   Resp 18   Ht 5' 5\" (1.651 m)   Wt 77.1 kg (170 lb)   SpO2 98%   BMI 28.29 kg/m²      Physical Exam  Vitals and nursing note reviewed.   Constitutional:       General: She is not in acute distress.     " Appearance: She is well-developed.   HENT:      Head: Normocephalic and atraumatic.      Right Ear: External ear normal.      Left Ear: External ear normal.      Nose: Nose normal.   Eyes:      General: No scleral icterus.        Right eye: No discharge.         Left eye: No discharge.      Extraocular Movements: Extraocular movements intact.      Conjunctiva/sclera: Conjunctivae normal.   Cardiovascular:      Rate and Rhythm: Normal rate and regular rhythm.      Pulses: Normal pulses.      Heart sounds: Normal heart sounds. No murmur heard.  Pulmonary:      Effort: Pulmonary effort is normal. No respiratory distress.      Breath sounds: Normal breath sounds. No wheezing.   Abdominal:      General: Bowel sounds are normal. There is no distension.      Palpations: Abdomen is soft.      Tenderness: There is no abdominal tenderness.   Musculoskeletal:         General: No swelling.      Cervical back: Normal range of motion and neck supple.      Right lower leg: No edema.      Left lower leg: No edema.      Comments: Right shoulder has a decrease in range of  motion when she tried to lift right shoulder above head.   Skin:     General: Skin is warm and dry.      Capillary Refill: Capillary refill takes less than 2 seconds.      Findings: No rash.   Neurological:      General: No focal deficit present.      Mental Status: She is alert and oriented to person, place, and time.   Psychiatric:         Mood and Affect: Mood normal.         Behavior: Behavior normal.

## 2024-12-17 NOTE — ASSESSMENT & PLAN NOTE
Last hemoglobin 11.7.  Will follow CBC.  As mentioned above now on vitamin B12 supplement.  Seen by GI specialist yesterday.  Orders:  •  CBC and differential; Future  •  Vitamin B12; Future

## 2024-12-17 NOTE — ASSESSMENT & PLAN NOTE
She takes calcium and vitamin D supplement.  Last DEXA scan April 2022 advised for follow-up DEXA scan.  Advised to exercise.  Orders:  •  Vitamin D 25 hydroxy; Future  •  DXA bone density spine hip and pelvis; Future

## 2024-12-17 NOTE — ASSESSMENT & PLAN NOTE
Vitamin D deficiency on vitamin D supplement vitamin D level 59 advised to continue same supplement will follow vitamin D level.  Orders:  •  Basic metabolic panel; Future  •  Vitamin D 25 hydroxy; Future  •  DXA bone density spine hip and pelvis; Future

## 2024-12-17 NOTE — PATIENT INSTRUCTIONS
Patient was advised to continue present medications. discussed with the patient medications and laboratory data in detail.  Follow-up with me in 3 months or as advised.  If any blood test was ordered please do 1 week prior to next appointment unless advise to get earlier.  If you have any questions please call the office [phone]  Patient Education     Type 2 diabetes   The Basics   Written by the doctors and editors at [facility]   What is type 2 diabetes? -- This is a disorder that disrupts the way the body uses sugar. It is sometimes called type 2 diabetes mellitus.  All of the cells in the body need sugar to work normally. Sugar gets into the cells with the help of a hormone called insulin. Insulin is made by the pancreas, an organ in the belly. If there is not enough insulin, or if cells in the body don't respond normally to insulin, sugar builds up in the blood. That is what happens to people with diabetes.  There are 2 different types of diabetes:   In type 1 diabetes, the pancreas makes little or no insulin.   In type 2 diabetes, the pancreas still makes some insulin, but the cells in the body stop responding normally. Eventually, the pancreas cannot make enough insulin to keep up.  Having excess body weight or obesity increases a person's risk of developing type 2 diabetes. But people without excess body weight can get diabetes, too.  What are the symptoms of type 2 diabetes? -- Type 2 diabetes usually causes no symptoms. When symptoms do happen, they include:   Needing to urinate often   Intense thirst   Blurry vision  Can diabetes lead to other health problems? -- Yes. Type 2 diabetes might not make you feel sick. But if it is not managed, it can lead to serious problems over time, such as:   Heart attacks   Strokes   Kidney disease   Vision problems (or even blindness)   Pain or loss of feeling in the hands and feet   Needing to have fingers, toes, or other body parts removed (amputated)  How do I know  "if I have type 2 diabetes? -- Your doctor or nurse can do a blood test. There are 2 tests that can be used for this. Both involve measuring the amount of sugar in your blood, called your \"blood sugar\" or \"blood glucose\":   One of the tests measures your blood sugar at the time the blood sample is taken. This test is done in the morning. You can't eat or drink anything except water for at least 8 hours before the test.   The other test shows what your average blood sugar has been for the past 2 to 3 months. This blood test is called \"hemoglobin A1C\" or just \"A1C.\" It can be checked at any time of the day, even if you have recently eaten.  How is type 2 diabetes treated? -- The goals of treatment are to manage your blood sugar and lower the risk of future problems that can happen in people with diabetes.  Treatment might include:   Lifestyle changes - This is an important part of managing diabetes. It includes eating healthy foods and getting plenty of physical activity.   Medicines - There are a few medicines that help lower blood sugar. Some people need to take pills that help the body make more insulin or that help insulin do its job. Others need insulin shots.  Depending on what medicines you take, you might need to check your blood sugar regularly at home. But not everyone with type 2 diabetes needs to do this. Your doctor or nurse will tell you if you should be checking your blood sugar, and when and how to do this.  Sometimes, people with type 2 diabetes also need medicines to help prevent problems caused by the disease. For instance, medicines used to lower blood pressure can reduce the chances of a heart attack or stroke.   General medical care - It's also important to take care of other areas of your health. This includes watching your blood pressure and cholesterol levels. You should also get certain vaccines, such as vaccines to protect against the flu and coronavirus disease 2019 (\"COVID-19\"). Some people " also need a vaccine to prevent pneumonia.  Can type 2 diabetes be prevented? -- Yes. To lower your chances of getting type 2 diabetes, the most important thing you can do is eat a healthy diet and get plenty of physical activity. This can help you lose weight if you are overweight. But eating well and being active are also good for your overall health. Even gentle activity, like walking, has benefits.  If you smoke, quitting can also lower your risk of type 2 diabetes. Quitting smoking can be difficult, but your doctor or nurse can help.  All topics are updated as new evidence becomes available and our peer review process is complete.  This topic retrieved from [employer] on: Apr 24, 2024.  Topic 60056 Version 23.0  Release: 32.3.2 - C32.113  © 2024 UpToDate, Inc. and/or its affiliates. All rights reserved.  Consumer Information Use and Disclaimer   Disclaimer: This generalized information is a limited summary of diagnosis, treatment, and/or medication information. It is not meant to be comprehensive and should be used as a tool to help the user understand and/or assess potential diagnostic and treatment options. It does NOT include all information about conditions, treatments, medications, side effects, or risks that may apply to a specific patient. It is not intended to be medical advice or a substitute for the medical advice, diagnosis, or treatment of a health care provider based on the health care provider's examination and assessment of a patient's specific and unique circumstances. Patients must speak with a health care provider for complete information about their health, medical questions, and treatment options, including any risks or benefits regarding use of medications. This information does not endorse any treatments or medications as safe, effective, or approved for treating a specific patient. UpToDate, Inc. and its affiliates disclaim any warranty or liability relating to this information or the use  thereof.The use of this information is governed by the Terms of Use, available at https://www.wolterskluwer.com/en/know/clinical-effectiveness-terms. 2024© UpToDate, Inc. and its affiliates and/or licensors. All rights reserved.  Copyright   © 2024 [employer] and/or its affiliates. All rights reserved.

## 2024-12-17 NOTE — ASSESSMENT & PLAN NOTE
Diet controlled.  Cholesterol 163, triglyceride 87, HDL 47, LDL 99 advised to continue low-cholesterol low carbs diet.  Will follow lipid panel.  Orders:  •  Lipid panel; Future

## 2024-12-17 NOTE — ASSESSMENT & PLAN NOTE
Patient  was advised to decrease portion size.  Advised to decrease carb, sugar, cholesterol intake.  Advised to exercise 3-5 times per week.  Advised to lose weight.  Patient states she has been watching her diet and try to exercise as much as she can to lose weight.

## 2024-12-18 LAB — COLOGUARD RESULT REPORTABLE: NEGATIVE

## 2024-12-18 NOTE — TELEPHONE ENCOUNTER
Upon review of the In Basket request and the patient's chart, initial outreach has been made via fax to facility. Please see Contacts section for details.     Thank you  Yousuf Flannery MA

## 2024-12-19 ENCOUNTER — RESULTS FOLLOW-UP (OUTPATIENT)
Dept: INTERNAL MEDICINE CLINIC | Facility: CLINIC | Age: 74
End: 2024-12-19

## 2024-12-19 NOTE — TELEPHONE ENCOUNTER
----- Message from Nicola Marquez MD sent at 12/19/2024 10:01 AM EST -----  Please call her that her Cologuard stool test is negative.

## 2024-12-19 NOTE — TELEPHONE ENCOUNTER
LDM per communication consent. Advised to call the office with any further questions or concerns.

## 2024-12-23 NOTE — TELEPHONE ENCOUNTER
Upon review of the In Basket request we were able to locate, review, and update the patient chart as requested for Diabetic Eye Exam.    Any additional questions or concerns should be emailed to the Practice Liaisons via the appropriate education email address, please do not reply via In Basket.    Thank you  Yousuf Flannery MA   [facility]

## 2025-01-15 ENCOUNTER — TELEPHONE (OUTPATIENT)
Age: 75
End: 2025-01-15

## 2025-02-05 ENCOUNTER — OFFICE VISIT (OUTPATIENT)
Dept: OBGYN CLINIC | Facility: CLINIC | Age: 75
End: 2025-02-05
Payer: COMMERCIAL

## 2025-02-05 VITALS — BODY MASS INDEX: 29.16 KG/M2 | WEIGHT: 175 LBS | HEIGHT: 65 IN

## 2025-02-05 DIAGNOSIS — M12.811 ROTATOR CUFF ARTHROPATHY OF RIGHT SHOULDER: ICD-10-CM

## 2025-02-05 DIAGNOSIS — M75.121 NONTRAUMATIC COMPLETE TEAR OF RIGHT ROTATOR CUFF: Primary | ICD-10-CM

## 2025-02-05 PROCEDURE — 20610 DRAIN/INJ JOINT/BURSA W/O US: CPT

## 2025-02-05 PROCEDURE — 99213 OFFICE O/P EST LOW 20 MIN: CPT | Performed by: STUDENT IN AN ORGANIZED HEALTH CARE EDUCATION/TRAINING PROGRAM

## 2025-02-05 RX ORDER — TRIAMCINOLONE ACETONIDE 40 MG/ML
40 INJECTION, SUSPENSION INTRA-ARTICULAR; INTRAMUSCULAR
Status: COMPLETED | OUTPATIENT
Start: 2025-02-05 | End: 2025-02-05

## 2025-02-05 RX ORDER — BUPIVACAINE HYDROCHLORIDE 2.5 MG/ML
4 INJECTION, SOLUTION INFILTRATION; PERINEURAL
Status: COMPLETED | OUTPATIENT
Start: 2025-02-05 | End: 2025-02-05

## 2025-02-05 RX ADMIN — BUPIVACAINE HYDROCHLORIDE 4 ML: 2.5 INJECTION, SOLUTION INFILTRATION; PERINEURAL at 09:15

## 2025-02-05 RX ADMIN — TRIAMCINOLONE ACETONIDE 40 MG: 40 INJECTION, SUSPENSION INTRA-ARTICULAR; INTRAMUSCULAR at 09:15

## 2025-02-05 NOTE — PROGRESS NOTES
"Ortho Sports Medicine Shoulder New Patient Visit     Assesment:   74 y.o. female right shoulder chronic rotator cuff tear    Plan:  The patient's diagnosis and treatment were discussed at length today. We discussed no treatment, non-operative treatment, and operative treatment.    Cris presents today for follow up right shoulder chronic rotator cuff tear.  Discussed that due to continued pain with previous pain relief with subacromial corticosteroid injection that recommended repeated injection at today's visit. A right subacromial corticosteroid injection was given without complication and was well tolerated.  Discussed that she can use ice, ibuprofen and tylenol as needed for continued discomfort and can follow up in 3 months for possible repeat corticosteroid injection.     Large joint arthrocentesis: R subacromial bursa  Universal Protocol:  Consent: Verbal consent obtained.  Risks and benefits: risks, benefits and alternatives were discussed  Consent given by: patient  Time out: Immediately prior to procedure a \"time out\" was called to verify the correct patient, procedure, equipment, support staff and site/side marked as required.  Timeout called at: 2/5/2025 9:59 AM.  Patient understanding: patient states understanding of the procedure being performed  Patient consent: the patient's understanding of the procedure matches consent given  Site marked: the operative site was marked  Patient identity confirmed: verbally with patient  Supporting Documentation  Indications: pain and diagnostic evaluation   Procedure Details  Location: shoulder - R subacromial bursa  Preparation: Patient was prepped and draped in the usual sterile fashion  Needle size: 22 G  Ultrasound guidance: no  Approach: posterior  Medications administered: 4 mL bupivacaine 0.25 %; 40 mg triamcinolone acetonide 40 mg/mL    Patient tolerance: patient tolerated the procedure well with no immediate complications  Dressing:  Sterile dressing " applied          Conservative treatment:    Ice to shoulder 1-2 times daily, for 20 minutes at a time.  PT for ROM and strengthening to shoulder, rotator cuff, scapular stabilizers.  Home exercise program for shoulder, including ROM and strenthening.  Instructions given to patient of what exercises to perform.  Let pain guide return to activities.      Imaging:    No imaging for review      Injection:    The risks and benefits of the injection (which include but are not limited to: infection, bleeding,damage to nerve/artery, need for further intervention), as well as the risks and benefits of all alternative treatments were explained and understood.  The patient elected to proceed with injection. The procedure was done with aseptic technique, and the patient tolerated the procedure well with no complications.  A corticosteroid injection of the subacromial space was performed.  The patient should take 1-2 days off of activity, with gradual return to activity as tolerated.  Ice to the shoulder 1-2 times daily for 20 minutes, for next 24-48 hrs.      Surgery:     No surgery is recommended at this point, continue with conservative treatment plan as noted.      Follow up:    Return if symptoms worsen or fail to improve.        Chief Complaint   Patient presents with    Right Shoulder - Follow-up, Pain     Wants CSI   Pain score 5       History of Present Illness:    The patient is a 74 y.o., right hand dominant female whose occupation is retired, referred to me by Wilman De La O, PT, seen in clinic for consultation of right shoulder pain.  She states that she has been having right shoulder pain now for approximately 2 to 3 months without any specific injury or trauma.  She states that the pain initially began when she was trying to get dressed and had sharp pain.  She states that her pain is predominantly on the anterior lateral aspect of her shoulder.  She states that this is because limited movement and issues with  activities of daily living.  She attempted any cortisone injections or outpatient physical therapy.  She is currently managing her pain with ice and over-the-counter meds she denies any numbness or tingling.    Update 10/30/24:  Patient is present for follow up, states that the last corticosteroid injection allowed her months of relief and pain is starting to return. She recently discontinued physical therapy and has noted some progress, still has limitation to forward flexion and abduction. She denies any new injury and denies any numbness or tingling. Pain is dull and achy with reaching above head.     Update 2/5/25:  Patient present for follow up, states that she had some relief from her last subacromial corticosteroid injection, however, her pain has returned and in inquiring about repeat injection. She denies any new injuries and denies any numbness or tingling. She has dull achy pain with ROM overhead that is 5/10.  Shoulder Surgical History:  None    Past Medical, Social and Family History:  Past Medical History:   Diagnosis Date    Abrasion of knee, bilateral 08/31/2022    Acute pain of right shoulder 08/19/2024    Allergic rhinitis 12/02/2021    Bilateral hearing loss 04/29/2022    Bilateral impacted cerumen 04/29/2022    BMI 28.0-28.9,adult 12/17/2024    BMI 31.0-31.9,adult 12/20/2023    BMI 31.0-31.9,adult 05/13/2024    BMI 32.0-32.9,adult 04/29/2022    BMI 33.0-33.9,adult 12/02/2021    Cataract, bilateral     Chronic bilateral low back pain without sciatica 12/08/2022    Chronic right-sided low back pain with right-sided sciatica 12/20/2023    Constipation     Constipation 12/02/2021    COVID-19 12/04/2023    Deficiency anemia 03/19/2024    Elevated glucose level     Fatigue     History of anemia     Hypertriglyceridemia     Hypertriglyceridemia 12/02/2021    Low vitamin B12 level 05/13/2024    Medicare annual wellness visit, subsequent 12/02/2021    Medicare annual wellness visit, subsequent 12/08/2022     Medicare annual wellness visit, subsequent 2023    Monoclonal gammopathy 2024    Obesity     Osteopenia     Osteopenia of multiple sites 2022    Pain in both knees 2022    Pain of left heel 2021    Post-menopausal     Prediabetes 2023    Rash     Spondylolisthesis of lumbar region 2024    Type 2 diabetes mellitus without complication, without long-term current use of insulin (HCC) 2024    Vitamin D deficiency     Vitamin D deficiency 2021     Past Surgical History:   Procedure Laterality Date    CATARACT EXTRACTION, BILATERAL  2018     SECTION      COLONOSCOPY  2014    Advise f/u colonoscopy; see by [facility] 2020 at office; she is supposed to have a colonoscopy, but due to Covid 19, she wants to hold for now     DXA PROCEDURE (HISTORICAL)  2020    MAMMO (HISTORICAL)  2020     No Known Allergies  Current Outpatient Medications on File Prior to Visit   Medication Sig Dispense Refill    calcium carbonate (OS-FELICITA) 600 MG tablet Take 600 mg by mouth 2 (two) times a day with meals      cholecalciferol (VITAMIN D3) 1,000 units tablet Take 1,000 Units by mouth daily Patient reports taking 2,000 units daily      glucose blood test strip Use 1 each daily as needed Use as instructed      linaCLOtide (Linzess) 145 MCG CAPS Take 1 capsule on an empty stomach (water only) 30 minutes before a meal (at the same time each day). Text Linzess to 87247 for co-pay card. 90 capsule 0    meloxicam (MOBIC) 15 mg tablet Take 1 tablet (15 mg total) by mouth daily as needed for moderate pain 30 tablet 0    Multiple Vitamin (MULTI-VITAMIN DAILY) TABS Take by mouth      psyllium (METAMUCIL) 58.6 % packet Take 1 packet by mouth as needed      senna-docusate sodium (SENOKOT-S) 8.6-50 mg per tablet Take 2 tablets by mouth 2 (two) times a day      vitamin B-12 (VITAMIN B-12) 1,000 mcg tablet Take 1,000 mcg by mouth daily       No current facility-administered  medications on file prior to visit.     Social History     Socioeconomic History    Marital status: /Civil Union     Spouse name: Not on file    Number of children: Not on file    Years of education: Not on file    Highest education level: Not on file   Occupational History    Not on file   Tobacco Use    Smoking status: Never    Smokeless tobacco: Never   Vaping Use    Vaping status: Never Used   Substance and Sexual Activity    Alcohol use: Yes     Alcohol/week: 1.0 standard drink of alcohol     Types: 1 Cans of beer per week     Comment: occasional    Drug use: No    Sexual activity: Not Currently     Partners: Male   Other Topics Concern    Not on file   Social History Narrative    Lives with spouse    Pap smear: She goes to her gyn for pap smear    Annual eye exam: Advise     Social Drivers of Health     Financial Resource Strain: Low Risk  (12/20/2023)    Overall Financial Resource Strain (CARDIA)     Difficulty of Paying Living Expenses: Not very hard   Food Insecurity: Not on file   Transportation Needs: No Transportation Needs (12/20/2023)    PRAPARE - Transportation     Lack of Transportation (Medical): No     Lack of Transportation (Non-Medical): No   Physical Activity: Not on file   Stress: Not on file   Social Connections: Not on file   Intimate Partner Violence: Not on file   Housing Stability: Not on file         I have reviewed the past medical, surgical, social and family history, medications and allergies as documented in the EMR.    Review of systems: ROS is negative other than that noted in the HPI.  Constitutional: Negative for fatigue and fever.   HENT: Negative for sore throat.    Respiratory: Negative for shortness of breath.    Cardiovascular: Negative for chest pain.   Gastrointestinal: Negative for abdominal pain.   Endocrine: Negative for cold intolerance and heat intolerance.   Genitourinary: Negative for flank pain.   Musculoskeletal: Negative for back pain.   Skin: Negative for  "rash.   Allergic/Immunologic: Negative for immunocompromised state.   Neurological: Negative for dizziness.   Psychiatric/Behavioral: Negative for agitation.      Physical Exam:    Height 5' 5\" (1.651 m), weight 79.4 kg (175 lb).    General/Constitutional: NAD, well developed, well nourished  HENT: Normocephalic, atraumatic  CV: Intact distal pulses, regular rate  Resp: No respiratory distress or labored breathing  Lymphatic: No lymphadenopathy palpated  Neuro: Alert and Oriented x 3, no focal deficits  Psych: Normal mood, normal affect, normal judgement, normal behavior  Skin: Warm, dry, no rashes, no erythema      Shoulder focused exam:     Visual inspection of the shoulder demonstrates normal contour without atrophy  No evidence of scapular dyskinesia or atrophy.  No scapular winging  Active and passive range of motion demonstrates forward flexion to 90, abduction to 80, difficulty reaching back of head, external rotation is 30 with the arm the side, internal rotation to L1   Rotator cuff strength is 4/5 to resisted forward flexion, 4/5 resisted abduction, 4/5 resisted external rotation, 5/5 resisted internal rotation  No tenderness to palpation at the distal clavicle, AC joint, acromion, or scapular spine  No pain with cross-body adduction  + Neer's test, + modified Ching impingement test  Negative external rotation lag sign  Negative belly press, negative lift-off  - speed's and Yergason's test  + tenderness to palpation at the bicipital groove  - Warrick's test        UE NV Exam: +2 Radial pulses bilaterally  Sensation intact to light touch C5-T1 bilaterally, Radial/median/ulnar nerve motor intact      Bilateral elbow, wrist, and and forearm ROM full, painless with passive ROM, no ttp or crepitance throughout extremities below shoulder joint    Cervical ROM is full without pain, numbness or tingling            Scribe Attestation      I,:  Andreia Post PA-C am acting as a scribe while in the presence of the " attending physician.:       I,:  Andreia Post PA-C personally performed the services described in this documentation    as scribed in my presence.:

## 2025-02-27 DIAGNOSIS — Z01.10 EVALUATION OF HEARING IMPAIRMENT: Primary | ICD-10-CM

## 2025-03-12 ENCOUNTER — RA CDI HCC (OUTPATIENT)
Dept: OTHER | Facility: HOSPITAL | Age: 75
End: 2025-03-12

## 2025-03-19 ENCOUNTER — OFFICE VISIT (OUTPATIENT)
Dept: INTERNAL MEDICINE CLINIC | Facility: CLINIC | Age: 75
End: 2025-03-19
Payer: COMMERCIAL

## 2025-03-19 VITALS
DIASTOLIC BLOOD PRESSURE: 78 MMHG | OXYGEN SATURATION: 98 % | SYSTOLIC BLOOD PRESSURE: 128 MMHG | HEIGHT: 65 IN | BODY MASS INDEX: 28.49 KG/M2 | HEART RATE: 58 BPM | WEIGHT: 171 LBS | RESPIRATION RATE: 16 BRPM | TEMPERATURE: 97.3 F

## 2025-03-19 DIAGNOSIS — H91.93 BILATERAL HEARING LOSS, UNSPECIFIED HEARING LOSS TYPE: ICD-10-CM

## 2025-03-19 DIAGNOSIS — D47.2 MONOCLONAL GAMMOPATHY: ICD-10-CM

## 2025-03-19 DIAGNOSIS — Z00.00 MEDICARE ANNUAL WELLNESS VISIT, SUBSEQUENT: Primary | ICD-10-CM

## 2025-03-19 DIAGNOSIS — E55.9 VITAMIN D DEFICIENCY: ICD-10-CM

## 2025-03-19 DIAGNOSIS — R79.89 LOW VITAMIN B12 LEVEL: ICD-10-CM

## 2025-03-19 DIAGNOSIS — M85.89 OSTEOPENIA OF MULTIPLE SITES: ICD-10-CM

## 2025-03-19 DIAGNOSIS — E11.9 TYPE 2 DIABETES MELLITUS WITHOUT COMPLICATION, WITHOUT LONG-TERM CURRENT USE OF INSULIN (HCC): ICD-10-CM

## 2025-03-19 DIAGNOSIS — D53.9 DEFICIENCY ANEMIA: ICD-10-CM

## 2025-03-19 DIAGNOSIS — E78.00 ELEVATED LDL CHOLESTEROL LEVEL: ICD-10-CM

## 2025-03-19 DIAGNOSIS — Z78.0 POSTMENOPAUSAL: ICD-10-CM

## 2025-03-19 DIAGNOSIS — K59.09 OTHER CONSTIPATION: ICD-10-CM

## 2025-03-19 DIAGNOSIS — M25.511 ACUTE PAIN OF RIGHT SHOULDER: ICD-10-CM

## 2025-03-19 DIAGNOSIS — E78.1 HYPERTRIGLYCERIDEMIA: ICD-10-CM

## 2025-03-19 PROCEDURE — G0439 PPPS, SUBSEQ VISIT: HCPCS | Performed by: INTERNAL MEDICINE

## 2025-03-19 PROCEDURE — 99213 OFFICE O/P EST LOW 20 MIN: CPT | Performed by: INTERNAL MEDICINE

## 2025-03-19 PROCEDURE — G2211 COMPLEX E/M VISIT ADD ON: HCPCS | Performed by: INTERNAL MEDICINE

## 2025-03-19 NOTE — ASSESSMENT & PLAN NOTE
She was seen by Dr. Lau oncologist August 2024.  Advised to follow with oncologist.  Orders:  •  Comprehensive metabolic panel; Future

## 2025-03-19 NOTE — ASSESSMENT & PLAN NOTE
She was seen by GI specialist was prescribed Linzess but she is not taking Linzess she takes Metamucil as well as Senokot.  Advised to maintain hydration.

## 2025-03-19 NOTE — ASSESSMENT & PLAN NOTE
She is on calcium and vitamin D supplement.  Advised her weightbearing exercise.  Advised for follow-up DEXA scan.  Orders:  •  Comprehensive metabolic panel; Future  •  DXA bone density spine hip and pelvis; Future

## 2025-03-19 NOTE — ASSESSMENT & PLAN NOTE
Last time her vitamin B12 was 292 since then after she has been taking vitamin B-12 supplement.  Will follow vitamin B12 level.  Orders:  •  CBC and differential; Future  •  Vitamin B12; Future

## 2025-03-19 NOTE — PROGRESS NOTES
Name: Cris Infante      : 1950      MRN: [id number]  Encounter Provider: Nicola Marquez MD  Encounter Date: 3/19/2025   Encounter department: [facility] INTERNAL MEDICINE    Assessment & Plan  Medicare annual wellness visit, subsequent  She was advised to get Shingrix vaccination as well as RSV vaccination.  Orders:  •  Lipid panel; Future  •  CBC and differential; Future  •  Comprehensive metabolic panel; Future  •  UA (URINE) with reflex to Scope; Future    Type 2 diabetes mellitus without complication, without long-term current use of insulin ([facility])  Diet controlled diabetes mellitus.  Advised to continue 1800-calorie low sugar low carbs diet.  Will follow blood sugar and hemoglobin A1c.  Lab Results   Component Value Date    HGBA1C 6.6 (H) 2024       Orders:  •  Albumin / creatinine urine ratio; Future  •  Comprehensive metabolic panel; Future  •  UA (URINE) with reflex to Scope; Future  •  Hemoglobin A1C; Future    Bilateral hearing loss, unspecified hearing loss type  She was seen by ENT specialist was prescribed fluticasone nasal spray.  Advised to follow with ENT.       Osteopenia of multiple sites  She is on calcium and vitamin D supplement.  Advised her weightbearing exercise.  Advised for follow-up DEXA scan.  Orders:  •  Comprehensive metabolic panel; Future  •  DXA bone density spine hip and pelvis; Future    Low vitamin B12 level  Last time her vitamin B12 was 292 since then after she has been taking vitamin B-12 supplement.  Will follow vitamin B12 level.  Orders:  •  CBC and differential; Future  •  Vitamin B12; Future    Monoclonal gammopathy  She was seen by Dr. Lau oncologist 2024.  Advised to follow with oncologist.  Orders:  •  Comprehensive metabolic panel; Future    Deficiency anemia  Last time hemoglobin was 11.7.  She was seen by hematologist.  Will follow CBC.  Orders:  •  CBC and differential; Future  •  Vitamin B12; Future    BMI  28.0-28.9,adult  Patient  was advised to decrease portion size.  Advised to decrease carb, sugar, cholesterol intake.  Advised to exercise 3-5 times per week.  Advised to lose weight.       Vitamin D deficiency  Vitamin D deficiency resolved vitamin D supplement vitamin D level 59 will follow vitamin D level.  Orders:  •  Comprehensive metabolic panel; Future  •  DXA bone density spine hip and pelvis; Future  •  Vitamin D 25 hydroxy; Future    Hypertriglyceridemia  Last time her cholesterol is 163 triglyceride 87, HDL 47 LDL decreased from 1 18-99.  Advised to continue low-cholesterol low carbs diet.  Lipid panel.  Orders:  •  Lipid panel; Future    Elevated LDL cholesterol level  As above.  Orders:  •  Lipid panel; Future    Postmenopausal    Orders:  •  DXA bone density spine hip and pelvis; Future    Acute pain of right shoulder  She has pain in the right shoulder on physical therapy has been seen and followed by orthopedic.  Still has a decreased range of motion.       Other constipation  She was seen by GI specialist was prescribed Linzess but she is not taking Linzess she takes Metamucil as well as Senokot.  Advised to maintain hydration.          Preventive health issues were discussed with patient, and age appropriate screening tests were ordered as noted in patient's After Visit Summary. Personalized health advice and appropriate referrals for health education or preventive services given if needed, as noted in patient's After Visit Summary.    History of Present Illness     HPI   74-year-old female patient presents for Medicare annual wellness exam as well as follow-up of her medical problems.  She is here with her .      Current Outpatient Medications:   •  calcium carbonate (OS-FELICITA) 600 MG tablet, Take 600 mg by mouth 2 (two) times a day with meals, Disp: , Rfl:   •  cholecalciferol (VITAMIN D3) 1,000 units tablet, Take 1,000 Units by mouth daily Patient reports taking 2,000 units daily, Disp: ,  Rfl:   •  fluticasone (FLONASE) 50 mcg/act nasal spray, 2 sprays into each nostril daily, Disp: 16 g, Rfl: 11  •  glucose blood test strip, Use 1 each daily as needed Use as instructed, Disp: , Rfl:   •  Multiple Vitamin (MULTI-VITAMIN DAILY) TABS, Take by mouth, Disp: , Rfl:   •  psyllium (METAMUCIL) 58.6 % packet, Take 1 packet by mouth as needed, Disp: , Rfl:   •  senna-docusate sodium (SENOKOT-S) 8.6-50 mg per tablet, Take 2 tablets by mouth 2 (two) times a day, Disp: , Rfl:   •  vitamin B-12 (VITAMIN B-12) 1,000 mcg tablet, Take 1,000 mcg by mouth daily, Disp: , Rfl:   •  linaCLOtide (Linzess) 145 MCG CAPS, Take 1 capsule on an empty stomach (water only) 30 minutes before a meal (at the same time each day). Text Linzess to [id number] for co-pay card. (Patient not taking: Reported on 3/19/2025), Disp: 90 capsule, Rfl: 0     Past Medical History:   Diagnosis Date   • Abrasion of knee, bilateral 08/31/2022   • Acute pain of right shoulder 08/19/2024   • Allergic rhinitis 12/02/2021   • Bilateral hearing loss 04/29/2022   • Bilateral impacted cerumen 04/29/2022   • BMI 28.0-28.9,adult 12/17/2024   • BMI 31.0-31.9,adult 12/20/2023   • BMI 31.0-31.9,adult 05/13/2024   • BMI 32.0-32.9,adult 04/29/2022   • BMI 33.0-33.9,adult 12/02/2021   • Cataract, bilateral    • Chronic bilateral low back pain without sciatica 12/08/2022   • Chronic right-sided low back pain with right-sided sciatica 12/20/2023   • Constipation    • Constipation 12/02/2021   • COVID-19 12/04/2023   • Deficiency anemia 03/19/2024   • Elevated glucose level    • Elevated LDL cholesterol level 03/19/2025   • Fatigue    • History of anemia    • Hypertriglyceridemia    • Hypertriglyceridemia 12/02/2021   • Low vitamin B12 level 05/13/2024   • Medicare annual wellness visit, subsequent 12/02/2021   • Medicare annual wellness visit, subsequent 12/08/2022   • Medicare annual wellness visit, subsequent 12/20/2023   • Medicare annual wellness visit, subsequent  03/19/2025   • Monoclonal gammopathy 05/13/2024   • Obesity    • Osteopenia    • Osteopenia of multiple sites 04/29/2022   • Pain in both knees 08/31/2022   • Pain of left heel 12/02/2021   • Post-menopausal    • Prediabetes 12/20/2023   • Rash    • Spondylolisthesis of lumbar region 05/13/2024   • Type 2 diabetes mellitus without complication, without long-term current use of insulin ([facility]CC) 05/13/2024   • Vitamin D deficiency    • Vitamin D deficiency 12/02/2021      Patient Care Team:  Nicola Marquez MD as PCP - General (Internal Medicine)    Review of Systems   Constitutional:  Negative for chills and fever.   HENT:  Negative for congestion, ear pain, hearing loss, nosebleeds, sinus pain, sore throat and trouble swallowing.    Eyes:  Negative for discharge, redness and visual disturbance.   Respiratory:  Negative for cough, chest tightness and shortness of breath.    Cardiovascular:  Negative for chest pain and palpitations.   Gastrointestinal:  Negative for abdominal pain, blood in stool, constipation, diarrhea, nausea and vomiting.   Genitourinary:  Negative for dysuria, flank pain and hematuria.   Musculoskeletal:  Positive for arthralgias (Right shoulder pain). Negative for myalgias and neck pain.   Skin:  Negative for rash.   Neurological:  Negative for dizziness, speech difficulty, weakness and headaches.   Hematological:  Does not bruise/bleed easily.   Psychiatric/Behavioral:  Negative for agitation and behavioral problems.        Medical History Reviewed by provider this encounter:  Tobacco  Allergies  Meds  Problems  Med Hx  Surg Hx  Fam Hx       Annual Wellness Visit Questionnaire   Cris is here for her Subsequent Wellness visit. Last Medicare Wellness visit information reviewed, patient interviewed and updates made to the record today.      Health Risk Assessment:   Patient rates overall health as good. Patient feels that their physical health rating is same. Patient is very  satisfied with their life. Eyesight was rated as same. Hearing was rated as same. Patient feels that their emotional and mental health rating is same. Patients states they are never, rarely angry. Patient states they are never, rarely unusually tired/fatigued. Pain experienced in the last 7 days has been none. Patient states that she has experienced no weight loss or gain in last 6 months.     Depression Screening:   PHQ-2 Score: 0      Fall Risk Screening:   In the past year, patient has experienced: no history of falling in past year      Urinary Incontinence Screening:   Patient has not leaked urine accidently in the last six months.     Home Safety:  Patient does not have trouble with stairs inside or outside of their home. Patient has working smoke alarms and has no working carbon monoxide detector. Home safety hazards include: none.     Nutrition:   Current diet is Regular and Limited junk food.     Medications:   Patient is currently taking over-the-counter supplements. OTC medications include: see medication list. Patient is able to manage medications.     Activities of Daily Living (ADLs)/Instrumental Activities of Daily Living (IADLs):   Walk and transfer into and out of bed and chair?: Yes  Dress and groom yourself?: Yes    Bathe or shower yourself?: Yes    Feed yourself? Yes  Do your laundry/housekeeping?: Yes  Manage your money, pay your bills and track your expenses?: Yes  Make your own meals?: Yes    Do your own shopping?: Yes    Previous Hospitalizations:   Any hospitalizations or ED visits within the last 12 months?: No      Advance Care Planning:   Living will: No    Durable POA for healthcare: No    Advanced directive: No    Advanced directive counseling given: Yes    ACP document given: Yes    Patient declined ACP directive: No      Cognitive Screening:   Provider or family/friend/caregiver concerned regarding cognition?: No    PREVENTIVE SCREENINGS      Cardiovascular Screening:    General:  History Lipid Disorder and Screening Current      Diabetes Screening:     General: History Diabetes and Screening Current      Colorectal Cancer Screening:     General: Screening Current      Breast Cancer Screening:     General: Screening Current      Cervical Cancer Screening:    General: Screening Not Indicated      Osteoporosis Screening:      Due for: Bone Density CT Axial      Abdominal Aortic Aneurysm (AAA) Screening:        General: Screening Not Indicated      Lung Cancer Screening:     General: Screening Not Indicated    Screening, Brief Intervention, and Referral to Treatment (SBIRT)     Screening  Typical number of drinks in a day: 0  Typical number of drinks in a week: 0  Interpretation: Low risk drinking behavior.    Single Item Drug Screening:  How often have you used an illegal drug (including marijuana) or a prescription medication for non-medical reasons in the past year? never    Single Item Drug Screen Score: 0  Interpretation: Negative screen for possible drug use disorder    Brief Intervention  Alcohol & drug use screenings were reviewed. No concerns regarding substance use disorder identified.     Other Counseling Topics:   Car/seat belt/driving safety, skin self-exam, sunscreen and calcium and vitamin D intake and regular weightbearing exercise.     Social Drivers of Health     Financial Resource Strain: Low Risk  (12/20/2023)    Overall Financial Resource Strain (CARDIA)    • Difficulty of Paying Living Expenses: Not very hard   Food Insecurity: No Food Insecurity (3/19/2025)    Hunger Vital Sign    • Worried About Running Out of Food in the Last Year: Never true    • Ran Out of Food in the Last Year: Never true   Transportation Needs: No Transportation Needs (3/19/2025)    PRAPARE - Transportation    • Lack of Transportation (Medical): No    • Lack of Transportation (Non-Medical): No   Housing Stability: Low Risk  (3/19/2025)    Housing Stability Vital Sign    • Unable to Pay for Housing in  "the Last Year: No    • Number of Times Moved in the Last Year: 0    • Homeless in the Last Year: No   Utilities: Not At Risk (3/19/2025)    [facility] Utilities    • Threatened with loss of utilities: No     No results found.    Objective   /78 (BP Location: Left arm, Patient Position: Sitting, Cuff Size: Large)   Pulse 58   Temp (!) 97.3 °F (36.3 °C) (Temporal)   Resp 16   Ht 5' 5\" (1.651 m)   Wt 77.6 kg (171 lb)   SpO2 98%   BMI 28.46 kg/m²     Physical Exam  Vitals and nursing note reviewed.   Constitutional:       General: She is not in acute distress.     Appearance: Normal appearance. She is well-developed.   HENT:      Head: Normocephalic and atraumatic.      Right Ear: Tympanic membrane, ear canal and external ear normal. There is no impacted cerumen.      Left Ear: Tympanic membrane, ear canal and external ear normal.      Nose: Nose normal.      Mouth/Throat:      Mouth: Mucous membranes are moist.      Pharynx: Oropharynx is clear.   Eyes:      General: No scleral icterus.        Right eye: No discharge.         Left eye: No discharge.      Extraocular Movements: Extraocular movements intact.      Conjunctiva/sclera: Conjunctivae normal.   Cardiovascular:      Rate and Rhythm: Normal rate and regular rhythm.      Pulses: Normal pulses.      Heart sounds: Normal heart sounds. No murmur heard.  Pulmonary:      Effort: Pulmonary effort is normal. No respiratory distress.      Breath sounds: Normal breath sounds. No wheezing.   Abdominal:      General: Bowel sounds are normal. There is no distension.      Palpations: Abdomen is soft.      Tenderness: There is no abdominal tenderness.   Musculoskeletal:         General: Tenderness (Right shoulder has a tenderness on certain movement as well as decreased range of motion.) present. No swelling.      Cervical back: Normal range of motion and neck supple.      Right lower leg: No edema.      Left lower leg: No edema.   Skin:     General: Skin is warm and " dry.      Capillary Refill: Capillary refill takes less than 2 seconds.      Findings: No rash.   Neurological:      General: No focal deficit present.      Mental Status: She is alert and oriented to person, place, and time.      Motor: No weakness.   Psychiatric:         Mood and Affect: Mood normal.         Behavior: Behavior normal.

## 2025-03-19 NOTE — PATIENT INSTRUCTIONS
Patient was advised to continue present medications. discussed with the patient medications and laboratory data in detail.  Follow-up with me in 6 months or as advised.  If any blood test was ordered please do 1 week prior to next appointment unless advise to get earlier.  If you have any questions please call the office [phone]

## 2025-03-19 NOTE — ASSESSMENT & PLAN NOTE
Last time her cholesterol is 163 triglyceride 87, HDL 47 LDL decreased from 1 18-99.  Advised to continue low-cholesterol low carbs diet.  Lipid panel.  Orders:  •  Lipid panel; Future

## 2025-03-19 NOTE — ASSESSMENT & PLAN NOTE
She was seen by ENT specialist was prescribed fluticasone nasal spray.  Advised to follow with ENT.

## 2025-03-19 NOTE — ASSESSMENT & PLAN NOTE
Diet controlled diabetes mellitus.  Advised to continue 1800-calorie low sugar low carbs diet.  Will follow blood sugar and hemoglobin A1c.  Lab Results   Component Value Date    HGBA1C 6.6 (H) 08/13/2024       Orders:  •  Albumin / creatinine urine ratio; Future  •  Comprehensive metabolic panel; Future  •  UA (URINE) with reflex to Scope; Future  •  Hemoglobin A1C; Future

## 2025-03-19 NOTE — ASSESSMENT & PLAN NOTE
She has pain in the right shoulder on physical therapy has been seen and followed by orthopedic.  Still has a decreased range of motion.

## 2025-03-19 NOTE — ASSESSMENT & PLAN NOTE
Vitamin D deficiency resolved vitamin D supplement vitamin D level 59 will follow vitamin D level.  Orders:  •  Comprehensive metabolic panel; Future  •  DXA bone density spine hip and pelvis; Future  •  Vitamin D 25 hydroxy; Future

## 2025-03-19 NOTE — ASSESSMENT & PLAN NOTE
Last time hemoglobin was 11.7.  She was seen by hematologist.  Will follow CBC.  Orders:  •  CBC and differential; Future  •  Vitamin B12; Future

## 2025-03-19 NOTE — ASSESSMENT & PLAN NOTE
She was advised to get Shingrix vaccination as well as RSV vaccination.  Orders:  •  Lipid panel; Future  •  CBC and differential; Future  •  Comprehensive metabolic panel; Future  •  UA (URINE) with reflex to Scope; Future

## 2025-04-18 PROBLEM — Z00.00 MEDICARE ANNUAL WELLNESS VISIT, SUBSEQUENT: Status: RESOLVED | Noted: 2025-03-19 | Resolved: 2025-04-18

## 2025-04-23 ENCOUNTER — APPOINTMENT (OUTPATIENT)
Dept: RADIOLOGY | Age: 75
End: 2025-04-23
Payer: COMMERCIAL

## 2025-04-23 ENCOUNTER — HOSPITAL ENCOUNTER (OUTPATIENT)
Dept: RADIOLOGY | Age: 75
Discharge: HOME | End: 2025-04-23
Attending: INTERNAL MEDICINE
Payer: COMMERCIAL

## 2025-04-23 DIAGNOSIS — M85.89 OSTEOPENIA OF MULTIPLE SITES: ICD-10-CM

## 2025-04-23 DIAGNOSIS — E55.9 VITAMIN D DEFICIENCY: ICD-10-CM

## 2025-04-23 DIAGNOSIS — Z78.0 POSTMENOPAUSAL: ICD-10-CM

## 2025-04-23 PROCEDURE — 77080 DXA BONE DENSITY AXIAL: CPT

## 2025-04-29 ENCOUNTER — RESULTS FOLLOW-UP (OUTPATIENT)
Dept: INTERNAL MEDICINE CLINIC | Facility: CLINIC | Age: 75
End: 2025-04-29

## 2025-05-05 ENCOUNTER — OFFICE VISIT (OUTPATIENT)
Dept: OBGYN CLINIC | Facility: CLINIC | Age: 75
End: 2025-05-05
Payer: COMMERCIAL

## 2025-05-05 VITALS — HEIGHT: 65 IN | WEIGHT: 172.6 LBS | BODY MASS INDEX: 28.76 KG/M2

## 2025-05-05 DIAGNOSIS — M75.121 NONTRAUMATIC COMPLETE TEAR OF RIGHT ROTATOR CUFF: Primary | ICD-10-CM

## 2025-05-05 PROCEDURE — 20610 DRAIN/INJ JOINT/BURSA W/O US: CPT

## 2025-05-05 PROCEDURE — 99213 OFFICE O/P EST LOW 20 MIN: CPT | Performed by: STUDENT IN AN ORGANIZED HEALTH CARE EDUCATION/TRAINING PROGRAM

## 2025-05-05 RX ORDER — BUPIVACAINE HYDROCHLORIDE 2.5 MG/ML
4 INJECTION, SOLUTION INFILTRATION; PERINEURAL
Status: COMPLETED | OUTPATIENT
Start: 2025-05-05 | End: 2025-05-05

## 2025-05-05 RX ORDER — TRIAMCINOLONE ACETONIDE 40 MG/ML
40 INJECTION, SUSPENSION INTRA-ARTICULAR; INTRAMUSCULAR
Status: COMPLETED | OUTPATIENT
Start: 2025-05-05 | End: 2025-05-05

## 2025-05-05 RX ADMIN — TRIAMCINOLONE ACETONIDE 40 MG: 40 INJECTION, SUSPENSION INTRA-ARTICULAR; INTRAMUSCULAR at 09:15

## 2025-05-05 RX ADMIN — BUPIVACAINE HYDROCHLORIDE 4 ML: 2.5 INJECTION, SOLUTION INFILTRATION; PERINEURAL at 09:15

## 2025-05-05 NOTE — PROGRESS NOTES
[facility] [place]  [address]    [place] [id number] [phone]       Cris Infante  [id number]  1950    ORTHOPAEDIC SURGERY OUTPATIENT NOTE  5/5/2025  :  Assessment & Plan  Nontraumatic complete tear of right rotator cuff    Patient with chronic ride sided rotator cuff tear, wants to continue non-op treatement  Recommended repeat right shoulder subacromial corticosteroid injection which was given and well tolerated  Recommended relaxing shoulder for couple of days  OTC pain medication as needed  Educated patient that injection can be repeated every 3 months  Follow up as needed          Large joint arthrocentesis: R subacromial bursa  Universal Protocol:  Consent: Verbal consent obtained.  Risks and benefits: risks, benefits and alternatives were discussed  Consent given by: patient  Timeout called at: 5/5/2025 9:37 AM.  Patient understanding: patient states understanding of the procedure being performed  Site marked: the operative site was marked  Patient identity confirmed: verbally with patient  Supporting Documentation  Indications: pain     Is this a Visco injection? NoProcedure Details  Location: shoulder - R subacromial bursa  Needle size: 22 G  Ultrasound guidance: no  Medications administered: 4 mL bupivacaine 0.25 %; 40 mg triamcinolone acetonide 40 mg/mL    Patient tolerance: patient tolerated the procedure well with no immediate complications  Dressing:  Sterile dressing applied             Translation: No    HISTORY:  74 y.o. female  who is present in office for follow up right shoulder chronic rotator cuff tear. Her last injection was given 2/5/25 which allowed couple months of relief. He pain has since returned and pain is a 5/10 worsened with forward flexion. She in inquiring about repeat injection at today's visit due to previous success of pain relief. She denies any new trauma or injury to the shoulder and denies  any numbness or tingling.     Surgical History:  None    Previous Injection(s): 2/5/25,  Right shoulder subacromial      The following portions of the patient's history were reviewed and updated as appropriate: allergies, current medications, past family history, past social history, past surgical history and problem list.    There were no vitals taken for this visit.   Lab Results   Component Value Date    HGBA1C 6.6 (H) 08/13/2024         Past Medical History:   Diagnosis Date    Abrasion of knee, bilateral 08/31/2022    Acute pain of right shoulder 08/19/2024    Allergic rhinitis 12/02/2021    Bilateral hearing loss 04/29/2022    Bilateral impacted cerumen 04/29/2022    BMI 28.0-28.9,adult 12/17/2024    BMI 31.0-31.9,adult 12/20/2023    BMI 31.0-31.9,adult 05/13/2024    BMI 32.0-32.9,adult 04/29/2022    BMI 33.0-33.9,adult 12/02/2021    Cataract, bilateral     Chronic bilateral low back pain without sciatica 12/08/2022    Chronic right-sided low back pain with right-sided sciatica 12/20/2023    Constipation     Constipation 12/02/2021    COVID-19 12/04/2023    Deficiency anemia 03/19/2024    Elevated glucose level     Elevated LDL cholesterol level 03/19/2025    Fatigue     History of anemia     Hypertriglyceridemia     Hypertriglyceridemia 12/02/2021    Low vitamin B12 level 05/13/2024    Medicare annual wellness visit, subsequent 12/02/2021    Medicare annual wellness visit, subsequent 12/08/2022    Medicare annual wellness visit, subsequent 12/20/2023    Medicare annual wellness visit, subsequent 03/19/2025    Monoclonal gammopathy 05/13/2024    Obesity     Osteopenia     Osteopenia of multiple sites 04/29/2022    Pain in both knees 08/31/2022    Pain of left heel 12/02/2021    Post-menopausal     Prediabetes 12/20/2023    Rash     Spondylolisthesis of lumbar region 05/13/2024    Type 2 diabetes mellitus without complication, without long-term current use of insulin (HCC) 05/13/2024    Vitamin D deficiency      Vitamin D deficiency 2021       Past Surgical History:   Procedure Laterality Date    CATARACT EXTRACTION, BILATERAL  2018     SECTION      COLONOSCOPY  2014    Advise f/u colonoscopy; see by [facility] 2020 at office; she is supposed to have a colonoscopy, but due to Covid 19, she wants to hold for now     DXA PROCEDURE (HISTORICAL)  2020    MAMMO (HISTORICAL)  2020       Social History     Socioeconomic History    Marital status: /Civil Union     Spouse name: Not on file    Number of children: Not on file    Years of education: Not on file    Highest education level: Not on file   Occupational History    Not on file   Tobacco Use    Smoking status: Never    Smokeless tobacco: Never   Vaping Use    Vaping status: Never Used   Substance and Sexual Activity    Alcohol use: Yes     Alcohol/week: 1.0 standard drink of alcohol     Types: 1 Cans of beer per week     Comment: occasional    Drug use: No    Sexual activity: Not Currently     Partners: Male   Other Topics Concern    Not on file   Social History Narrative    Lives with spouse    Pap smear: She goes to her gyn for pap smear    Annual eye exam: Advise     Social Drivers of Health     Financial Resource Strain: Low Risk  (2023)    Overall Financial Resource Strain (CARDIA)     Difficulty of Paying Living Expenses: Not very hard   Food Insecurity: No Food Insecurity (3/19/2025)    Hunger Vital Sign     Worried About Running Out of Food in the Last Year: Never true     Ran Out of Food in the Last Year: Never true   Transportation Needs: No Transportation Needs (3/19/2025)    PRAPARE - Transportation     Lack of Transportation (Medical): No     Lack of Transportation (Non-Medical): No   Physical Activity: Not on file   Stress: Not on file   Social Connections: Not on file   Intimate Partner Violence: Not on file   Housing Stability: Low Risk  (3/19/2025)    Housing Stability Vital Sign     Unable to Pay for Housing in  the Last Year: No     Number of Times Moved in the Last Year: 0     Homeless in the Last Year: No       Family History   Problem Relation Age of Onset    Diabetes Mother     Other Mother         Memory impairment, at old age    No Known Problems Father     Diabetes Sister     Diabetes Sister     No Known Problems Sister     No Known Problems Sister     No Known Problems Daughter     No Known Problems Daughter     No Known Problems Daughter     No Known Problems Daughter     No Known Problems Daughter     No Known Problems Maternal Grandmother     No Known Problems Maternal Grandfather     No Known Problems Paternal Grandmother     No Known Problems Paternal Grandfather     Diabetes Brother     Diabetes Brother     No Known Problems Brother     No Known Problems Brother     No Known Problems Son     No Known Problems Maternal Aunt     No Known Problems Paternal Aunt         Patient's Medications   New Prescriptions    No medications on file   Previous Medications    CALCIUM CARBONATE (OS-FELICITA) 600 MG TABLET    Take 600 mg by mouth 2 (two) times a day with meals    CHOLECALCIFEROL (VITAMIN D3) 1,000 UNITS TABLET    Take 1,000 Units by mouth daily Patient reports taking 2,000 units daily    FLUTICASONE (FLONASE) 50 MCG/ACT NASAL SPRAY    2 sprays into each nostril daily    GLUCOSE BLOOD TEST STRIP    Use 1 each daily as needed Use as instructed    LINACLOTIDE (LINZESS) 145 MCG CAPS    Take 1 capsule on an empty stomach (water only) 30 minutes before a meal (at the same time each day). [place] 14[address] for co-pay card.    MULTIPLE VITAMIN (MULTI-VITAMIN DAILY) TABS    Take by mouth    PSYLLIUM (METAMUCIL) 58.6 % PACKET    Take 1 packet by mouth as needed    SENNA-DOCUSATE SODIUM (SENOKOT-S) 8.6-50 MG PER TABLET    Take 2 tablets by mouth 2 (two) times a day    VITAMIN B-12 (VITAMIN B-12) 1,000 MCG TABLET    Take 1,000 mcg by mouth daily   Modified Medications    No medications on file   Discontinued Medications     "No medications on file       No Known Allergies       REVIEW OF SYSTEMS:  Constitutional: Negative.    HEENT: Negative.    Respiratory: Negative.    Skin: Negative.    Neurological: Negative.    Psychiatric/Behavioral: Negative.  Musculoskeletal: Negative except for that mentioned in the HPI.    Gen: No acute distress, resting comfortably in bed  HEENT: Eyes clear, moist mucus membranes, hearing intact  Respiratory: No audible wheezing or stridor  Cardiovascular: Well Perfused peripherally, 2+ distal pulse  Abdomen: nondistended, no peritoneal signs     PHYSICAL EXAM:      Visual inspection of the shoulder demonstrates normal contour without atrophy  No evidence of scapular dyskinesia or atrophy.  No scapular winging  Active and passive range of motion demonstrates forward flexion to 90, abduction to 80, difficulty reaching back of head, external rotation is 30 with the arm the side, internal rotation to L1   Rotator cuff strength is 4/5 to resisted forward flexion, 4/5 resisted abduction, 4/5 resisted external rotation, 5/5 resisted internal rotation  No tenderness to palpation at the distal clavicle, AC joint, acromion, or scapular spine  No pain with cross-body adduction  + Neer's test, + modified Ching impingement test  Negative external rotation lag sign  Negative belly press, negative lift-off  - speed's and Yergason's test  + tenderness to palpation at the bicipital groove  - Hollywood's test        Andreia Post PA-C    Scribe Attestation      I,:   am acting as a scribe while in the presence of the attending physician.:       I,:   personally performed the services described in this documentation    as scribed in my presence.:               Portions of the record may have been created with voice recognition software.  Occasional wrong word or \"sound a like\" substitutions may have occurred due to the inherent limitations of voice recognition software.  Read the chart carefully and recognize, using context, " where substitutions have occurred. All patient's questions were answered to their satisfaction.

## 2025-05-15 ENCOUNTER — TELEPHONE (OUTPATIENT)
Dept: GASTROENTEROLOGY | Facility: CLINIC | Age: 75
End: 2025-05-15

## 2025-05-15 NOTE — TELEPHONE ENCOUNTER
Lmm for pt to call back . Please r/s appt from 7/25/25  with Dr Caldera due to a provider schedule change. Sb